# Patient Record
Sex: FEMALE | Race: WHITE | NOT HISPANIC OR LATINO | Employment: OTHER | URBAN - METROPOLITAN AREA
[De-identification: names, ages, dates, MRNs, and addresses within clinical notes are randomized per-mention and may not be internally consistent; named-entity substitution may affect disease eponyms.]

---

## 2017-01-04 RX ORDER — HYOSCYAMINE SULFATE 0.12 MG/1
TABLET SUBLINGUAL
Qty: 60 TABLET | Refills: 1 | OUTPATIENT
Start: 2017-01-04

## 2017-01-24 ENCOUNTER — TELEPHONE (OUTPATIENT)
Dept: GASTROENTEROLOGY | Facility: CLINIC | Age: 32
End: 2017-01-24

## 2017-01-24 DIAGNOSIS — K21.9 GASTROESOPHAGEAL REFLUX DISEASE, ESOPHAGITIS PRESENCE NOT SPECIFIED: Primary | ICD-10-CM

## 2017-01-25 ENCOUNTER — PATIENT MESSAGE (OUTPATIENT)
Dept: GASTROENTEROLOGY | Facility: CLINIC | Age: 32
End: 2017-01-25

## 2017-02-22 ENCOUNTER — HOSPITAL ENCOUNTER (EMERGENCY)
Facility: HOSPITAL | Age: 32
Discharge: HOME | End: 2017-02-22
Attending: EMERGENCY MEDICINE
Payer: COMMERCIAL

## 2017-02-22 VITALS
WEIGHT: 125 LBS | BODY MASS INDEX: 24.54 KG/M2 | HEART RATE: 100 BPM | OXYGEN SATURATION: 98 % | DIASTOLIC BLOOD PRESSURE: 77 MMHG | HEIGHT: 60 IN | TEMPERATURE: 99 F | RESPIRATION RATE: 18 BRPM | SYSTOLIC BLOOD PRESSURE: 117 MMHG
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
## 2017-02-22 DIAGNOSIS — S81.851A DOG BITE OF LOWER LEG, RIGHT, INITIAL ENCOUNTER: Primary | ICD-10-CM

## 2017-02-22 LAB
B-HCG UR QL: NEGATIVE
BILIRUB UR QL STRIP: NEGATIVE
CLARITY UR REFRACT.AUTO: CLEAR
COLOR UR AUTO: YELLOW
CTP QC/QA: YES
GLUCOSE UR QL STRIP: NEGATIVE
HGB UR QL STRIP: ABNORMAL
KETONES UR QL STRIP: NEGATIVE
LEUKOCYTE ESTERASE UR QL STRIP: NEGATIVE
MICROSCOPIC COMMENT: NORMAL
NITRITE UR QL STRIP: NEGATIVE
PH UR STRIP: 6 [PH] (ref 5–8)
PROT UR QL STRIP: NEGATIVE
RBC #/AREA URNS AUTO: 1 /HPF (ref 0–4)
SP GR UR STRIP: 1 (ref 1–1.03)
SQUAMOUS #/AREA URNS AUTO: 1 /HPF
URN SPEC COLLECT METH UR: ABNORMAL
UROBILINOGEN UR STRIP-ACNC: NEGATIVE EU/DL
WBC #/AREA URNS AUTO: 1 /HPF (ref 0–5)

## 2017-02-22 PROCEDURE — 90715 TDAP VACCINE 7 YRS/> IM: CPT | Performed by: PHYSICIAN ASSISTANT

## 2017-02-22 PROCEDURE — 81025 URINE PREGNANCY TEST: CPT | Performed by: PHYSICIAN ASSISTANT

## 2017-02-22 PROCEDURE — 63600175 PHARM REV CODE 636 W HCPCS: Performed by: PHYSICIAN ASSISTANT

## 2017-02-22 PROCEDURE — 90675 RABIES VACCINE IM: CPT | Performed by: PHYSICIAN ASSISTANT

## 2017-02-22 PROCEDURE — 99284 EMERGENCY DEPT VISIT MOD MDM: CPT | Mod: ,,, | Performed by: PHYSICIAN ASSISTANT

## 2017-02-22 PROCEDURE — 99283 EMERGENCY DEPT VISIT LOW MDM: CPT | Mod: 25

## 2017-02-22 PROCEDURE — 90375 RABIES IG IM/SC: CPT | Performed by: PHYSICIAN ASSISTANT

## 2017-02-22 PROCEDURE — 96372 THER/PROPH/DIAG INJ SC/IM: CPT

## 2017-02-22 PROCEDURE — 90471 IMMUNIZATION ADMIN: CPT | Performed by: PHYSICIAN ASSISTANT

## 2017-02-22 PROCEDURE — 90472 IMMUNIZATION ADMIN EACH ADD: CPT | Performed by: PHYSICIAN ASSISTANT

## 2017-02-22 PROCEDURE — 81001 URINALYSIS AUTO W/SCOPE: CPT

## 2017-02-22 RX ORDER — CALC/MAG/B COMPLEX/D3/HERB 61
15 TABLET ORAL DAILY
COMMUNITY
End: 2018-04-09

## 2017-02-22 RX ORDER — AMOXICILLIN AND CLAVULANATE POTASSIUM 875; 125 MG/1; MG/1
1 TABLET, FILM COATED ORAL 2 TIMES DAILY
Qty: 20 TABLET | Refills: 0 | Status: SHIPPED | OUTPATIENT
Start: 2017-02-22 | End: 2017-03-04

## 2017-02-22 RX ADMIN — RABIES VIRUS STRAIN PM-1503-3M ANTIGEN (PROPIOLACTONE INACTIVATED) AND WATER 2.5 UNITS: KIT at 11:02

## 2017-02-22 RX ADMIN — RABIES IMMUNE GLOBULIN (HUMAN) 1140 UNITS: 150 INJECTION INTRAMUSCULAR at 12:02

## 2017-02-22 RX ADMIN — CLOSTRIDIUM TETANI TOXOID ANTIGEN (FORMALDEHYDE INACTIVATED), CORYNEBACTERIUM DIPHTHERIAE TOXOID ANTIGEN (FORMALDEHYDE INACTIVATED), BORDETELLA PERTUSSIS TOXOID ANTIGEN (GLUTARALDEHYDE INACTIVATED), BORDETELLA PERTUSSIS FILAMENTOUS HEMAGGLUTININ ANTIGEN (FORMALDEHYDE INACTIVATED), BORDETELLA PERTUSSIS PERTACTIN ANTIGEN, AND BORDETELLA PERTUSSIS FIMBRIAE 2/3 ANTIGEN 0.5 ML: 5; 2; 2.5; 5; 3; 5 INJECTION, SUSPENSION INTRAMUSCULAR at 11:02

## 2017-02-22 NOTE — ED NOTES
"The patient came to the ER today with c/o a dog bite to the right leg. [place] dog, when the dog bit her on the leg. Last tetanus injection when she "was a kid". Pt cleaned her wound with antiseptic prior to coming to the ER. Rabies status of animal is unknown. Pt also c/o urinary frequency and thinks she has a UTI  "

## 2017-02-22 NOTE — ED PROVIDER NOTES
Encounter Date: 2017    SCRIBE #1 NOTE: I, Iva Stevenson, am scribing for, and in the presence of,  Dr. Stanley. I have scribed the following portions of the note - the APC attestation.       History     Chief Complaint   Patient presents with    Animal Bite     right shin/     Review of patient's allergies indicates:   Allergen Reactions    Codeine Rash     HPI Comments: Time seen by provider: 11:10 AM    Disclaimer: This note has been generated using voice-recognition software. There may be typographical errors that have been missed during proof-reading.    This is a 31-year-old white female with past medical history of GERD, ALLERGIES, arthritis who presents to the ER with a chief complaint of a dog bite to her right lower leg.  Patient states she was riding her bike when a dog came up to her and bit her on the leg and then ran off.  Patient has no idea whose dog it was.  She has 3 puncture wounds to her right lateral lower leg.  Patient states she cleaned them with water.  She is unsure of her last tetanus shot.  Patient also states she thinks she may have a UTI she has been having urinary frequency for the past day.  Patient states this sometimes occurs after biking.    The history is provided by the patient.     Past Medical History   Diagnosis Date    Arthritis     GERD (gastroesophageal reflux disease)     Hyperlipidemia     Seasonal allergies 6/10/2015     No past medical history pertinent negatives.  Past Surgical History   Procedure Laterality Date    Wrist surgery       Ligament repair of Right wrist x 2    Tonsillectomy  2014     Family History   Problem Relation Age of Onset    Hyperlipidemia Father     Hyperlipidemia Mother     Acne Neg Hx     Colon cancer Neg Hx     Ovarian cancer Neg Hx     Breast cancer Neg Hx     Diabetes Neg Hx     Hypertension Neg Hx     Eclampsia Neg Hx     Miscarriages / Stillbirths Neg Hx      labor Neg Hx     Stroke Neg Hx     Cancer Neg Hx      Colon polyps Neg Hx     Liver cancer Neg Hx     Liver disease Neg Hx     Cirrhosis Neg Hx     Rectal cancer Neg Hx     Stomach cancer Neg Hx     Esophageal cancer Neg Hx     Celiac disease Neg Hx     Inflammatory bowel disease Neg Hx     Crohn's disease Neg Hx      Social History   Substance Use Topics    Smoking status: Never Smoker    Smokeless tobacco: Never Used    Alcohol use 0.6 oz/week     1 Glasses of wine per week      Comment: socially     Review of Systems   Constitutional: Negative for chills and fever.   Respiratory: Negative for shortness of breath.    Cardiovascular: Negative for chest pain.   Genitourinary: Positive for frequency.   Musculoskeletal: Positive for arthralgias and joint swelling. Negative for neck pain and neck stiffness.   Skin: Positive for wound. Negative for rash.   Neurological: Negative for weakness and numbness.   Psychiatric/Behavioral: Negative for confusion.       Physical Exam   Initial Vitals   BP Pulse Resp Temp SpO2   02/22/17 0953 02/22/17 0953 02/22/17 0953 02/22/17 0953 02/22/17 0953   117/77 100 18 98.7 °F (37.1 °C) 98 %     Physical Exam    Nursing note and vitals reviewed.  Constitutional: She appears well-developed and well-nourished. No distress.   HENT:   Head: Normocephalic and atraumatic.   Neck: Normal range of motion. Neck supple.   Cardiovascular: Normal rate and regular rhythm. Exam reveals no gallop and no friction rub.    No murmur heard.  Pulmonary/Chest: Breath sounds normal. She has no wheezes. She has no rhonchi. She has no rales.   Musculoskeletal: Normal range of motion.        Right lower leg: She exhibits tenderness, swelling and laceration (3 Puncture wounds/superficial lacerations from recent dog bite). She exhibits no bony tenderness.        Legs:  Neurological: She is alert and oriented to person, place, and time.   Skin: Skin is warm and dry. Laceration (3 puncture wounds/superficial lacerations to the right lateral lower leg)  noted. No rash noted. No erythema.         ED Course   Procedures     11:30 AM - The wounds were cleaned with betadine and irrigated with saline.      12:00 PM - The dog bite wounds to the right lateral lower leg were infiltrated with 4 cc (600 units) of rabies immunoglobulin.  The remained was given IM by the nurse taking care of the patient.    Labs Reviewed   URINALYSIS - Abnormal; Notable for the following:        Result Value    Occult Blood UA 2+ (*)     All other components within normal limits    Narrative:     1 cup of urine   URINALYSIS MICROSCOPIC    Narrative:     1 cup of urine   POCT URINE PREGNANCY             Medical Decision Making:   History:   Old Medical Records: I decided to obtain old medical records.  Clinical Tests:   Lab Tests: Ordered and Reviewed  ED Management:  Patient presented to the ER with a complaint of a dog bite to her right lower leg.  She was riding her bike and a dog came up to her and bit her on the leg and then ran off.  She does not know whose dog it was and if it is up-to-date on shots.  She is unsure of her last tetanus shot.  He has 3 puncture wounds to the right lower leg.  The area was cleaned with Betadine, irrigated with saline.  She was given a tetanus shot.  She was given her first rabies vaccination.  She was also given the rabies immunoglobulin directly into the wound and the remainder given his IM injections.  She has been instructed to return on days 3, 7, and 14 for the remainder of her rabies vaccinations.  She has been instructed to followup with her PCP within the next week if symptoms not improving.  She should return to the ER for any new or worsening symptoms.  This case was discussed with my supervising physician.            Scribe Attestation:   Scribe #1: I performed the above scribed service and the documentation accurately describes the services I performed. I attest to the accuracy of the note.    Attending Attestation:     Physician Attestation  Statement for NP/PA:   I discussed this assessment and plan of this patient with the NP/PA, but I did not personally examine the patient. The face to face encounter was performed by the NP/PA.    Other NP/PA Attestation Additions:      Medical Decision Making: Dog bite       Physician Attestation for Scribe:  Physician Attestation Statement for Scribe #1: I, Dr. Stanley, reviewed documentation, as scribed by Iva Stevenson in my presence, and it is both accurate and complete.                 ED Course     Clinical Impression:   The encounter diagnosis was Dog bite of lower leg, right, initial encounter.    Disposition:   Disposition: Discharged  Condition: Stable       Ratna Carrillo PA-C  02/22/17 1556       Nichelle Stanley MD  02/24/17 1046

## 2017-02-22 NOTE — DISCHARGE INSTRUCTIONS
Rest.  Keep the wound clean and dry.  Wash daily with soap and water.  Elevate when possible.  Change dressing daily.  Tylenol or ibuprofen as directed as needed for fever/pain.  You need to receive 3 more rabies vaccines on days 3, 7, and 14.  You do not need to take the anitbiotic prescription unless you begin to show signs of infection (redness, fever, pus drainage).  Return to the ER for any new or worsening symptoms.

## 2017-02-22 NOTE — ED AVS SNAPSHOT
[facility]  [address]  [facility] [address]               Luz Muñiz   2017 11:06 AM   ED    Description:  Female : 1985   Department:  [facility]           Your Care was Coordinated By:     Provider Role From To    Nichelle Stanley MD Attending Provider 17 1112 --    Ratna Carrillo PA-C Physician Assistant 17 1106 --      Reason for Visit     Animal Bite           Diagnoses this Visit        Comments    Dog bite of lower leg, right, initial encounter    -  Primary       ED Disposition     ED Disposition Condition Comment    Discharge             To Do List           Follow-up Information     Follow up with Angie Jansen MD. Call today.    Specialty:  Internal Medicine    Why:  for day 3, 7, and 14 rabies vaccine    Contact information:    [address]  [facility] [address]  [phone]         These Medications        Disp Refills Start End    amoxicillin-clavulanate 875-125mg (AUGMENTIN) 875-125 mg per tablet 20 tablet 0 2017 3/4/2017    Take 1 tablet by mouth 2 (two) times daily. - Oral    Pharmacy: [facility]/pharmacy #[id number]  [place], [place] - [address] Ph #: [phone]         [facility]     [facility] -  Assistance  Registered nurses in the [facility] provide clinical advisement, health education, appointment booking, and other advisory services.  Call for this free service at [phone].             Medications           Message regarding Medications     Verify the changes and/or additions to your medication regime listed below are the same as discussed with your clinician today.  If any of these changes or additions are incorrect, please notify your healthcare provider.        START taking these NEW medications        Refills    amoxicillin-clavulanate 875-125mg (AUGMENTIN) 875-125 mg per tablet 0    Sig: Take 1 tablet by mouth 2 (two)  times daily.    Class: Print    Route: Oral      These medications were administered today        Dose Freq    rabies immune globulin (PF) 150 unit/mL injection 1,140 Units 20 Units/kg × 56.7 kg ED 1 Time    Sig: Inject 7.6 mLs (1,140 Units total) into the muscle ED 1 Time.    Class: Normal    Route: Intramuscular    rabies vaccine,human diploid 2.5 unit injection 2.5 Units 2.5 Units ED 1 Time    Sig: Inject 1 mL (2.5 Units total) into the muscle ED 1 Time.    Class: Normal    Route: Intramuscular    Tdap vaccine injection 0.5 mL 0.5 mL ED 1 Time    Sig: Inject 0.5 mLs into the muscle ED 1 Time.    Class: Normal    Route: Intramuscular      STOP taking these medications     esomeprazole (NEXIUM) 40 MG capsule Take 1 capsule (40 mg total) by mouth 2 (two) times daily.    hyoscyamine (LEVSIN/SL) 0.125 mg Subl PLACE 1 TABLET (0.125 MG TOTAL) UNDER THE TONGUE EVERY 12 (TWELVE) HOURS AS NEEDED.    lorazepam (ATIVAN) 0.5 MG tablet Take 0.5 mg by mouth every 6 (six) hours as needed for Anxiety.           Verify that the below list of medications is an accurate representation of the medications you are currently taking.  If none reported, the list may be blank. If incorrect, please contact your healthcare provider. Carry this list with you in case of emergency.           Current Medications     busPIRone (BUSPAR) 5 MG Tab Take 5 mg by mouth 4 (four) times daily.    lansoprazole (PREVACID) 15 MG capsule Take 15 mg by mouth once daily.    norethindrone-ethinyl estradiol (MICROGESTIN FE 1/20, 28,) 1 mg-20 mcg (21)/75 mg (7) per tablet Take 1 tablet by mouth once daily.    amoxicillin-clavulanate 875-125mg (AUGMENTIN) 875-125 mg per tablet Take 1 tablet by mouth 2 (two) times daily.    fluticasone (FLONASE) 50 mcg/actuation nasal spray 1 spray by Nasal route once daily.             Clinical Reference Information           Your Vitals Were     BP Pulse Temp Resp Height Weight    117/77 (BP Location: Right arm, Patient Position:  Sitting) 100 98.7 °F (37.1 °C) (Oral) 18 5' (1.524 m) 56.7 kg (125 lb)    SpO2 BMI             98% 24.41 kg/m2         Allergies as of 2/22/2017        Reactions    Codeine Rash      Immunizations Administered on Date of Encounter - 2/22/2017     Name Date Dose VIS Date Route    Rabies - IM 2/22/2017 2.5 Units 10/6/2009 Intramuscular    TDAP 2/22/2017 0.5 mL 2/24/2015 Intramuscular      ED Micro, Lab, POCT     Start Ordered       Status Ordering Provider    02/22/17 1117 02/22/17 1117  Urinalysis Microscopic  Once      Final result     02/22/17 1113 02/22/17 1116  POCT urine pregnancy  Once      Final result     02/22/17 1113 02/22/17 1116  Urinalysis  STAT      Final result       ED Imaging Orders     None        Discharge Instructions       Rest.  Keep the wound clean and dry.  Wash daily with soap and water.  Elevate when possible.  Change dressing daily.  Tylenol or ibuprofen as directed as needed for fever/pain.  You need to receive 3 more rabies vaccines on days 3, 7, and 14.  You do not need to take the anitbiotic prescription unless you begin to show signs of infection (redness, fever, pus drainage).  Return to the ER for any new or worsening symptoms.    Discharge References/Attachments     DOG BITE (ENGLISH)    RABIES IMMUNE GLOBULIN, HUMAN RIG SOLUTION FOR INJECTION (ENGLISH)    RABIES VACCINE SUSPENSION FOR INJECTION (ENGLISH)      Your Scheduled Appointments     Mar 06, 2017  9:00 AM CST   Annual Checkup/Physical with Ifeoma Gordon MD   [facility] - OB/GYN [address] ([facility])    [address] [address] [phone]            Mar 16, 2017  8:00 AM CDT   GASTROENTEROLOGY ESTABLISHED PATIENT with Cornelio Webb MD   [facility] - Gastroenterology ([facility] )    [address]  [place] [address] [phone]            Mar 16, 2017  9:00 AM CDT   Non-Fasting Lab with LAB, APPOINTMENT [place] [facility] ([facility])     [address]  [facility] [address]   [phone]            Apr 24, 2017  8:30 AM CDT   Annual Checkup/Physical with MD [facility] - Internal Medicine ([facility])    [address]  [facility] [address]   [phone]               [facility] complies with applicable Federal civil rights laws and does not discriminate on the basis of race, color, national origin, age, disability, or sex.        Language Assistance Services     ATTENTION: Language assistance services are available, free of charge. Please call [phone].      ATENCIÓN: Si habla español, tiene a butcher disposición servicios gratuitos de asistencia lingüística. Llame al [phone].     CHÚ Ý: N?u b?n nói Ti?ng Vi?t, có các d?ch v? h? tr? ngôn ng? mi?n phí dành cho b?n. G?i s? [phone].

## 2017-02-23 DIAGNOSIS — Z23 NEED FOR VACCINATION: Primary | ICD-10-CM

## 2017-02-24 ENCOUNTER — CLINICAL SUPPORT (OUTPATIENT)
Dept: INFECTIOUS DISEASES | Facility: CLINIC | Age: 32
End: 2017-02-24
Payer: COMMERCIAL

## 2017-02-24 DIAGNOSIS — Z23 NEED FOR VACCINATION: ICD-10-CM

## 2017-02-24 PROCEDURE — 90675 RABIES VACCINE IM: CPT | Mod: S$GLB,,, | Performed by: INTERNAL MEDICINE

## 2017-02-24 PROCEDURE — 99999 PR PBB SHADOW E&M-EST. PATIENT-LVL I: CPT | Mod: PBBFAC,,,

## 2017-02-24 PROCEDURE — 90471 IMMUNIZATION ADMIN: CPT | Mod: S$GLB,,, | Performed by: INTERNAL MEDICINE

## 2017-02-25 DIAGNOSIS — Z30.40 ENCOUNTER FOR SURVEILLANCE OF CONTRACEPTIVES: ICD-10-CM

## 2017-02-27 RX ORDER — NORETHINDRONE ACETATE AND ETHINYL ESTRADIOL AND FERROUS FUMARATE 1MG-20(21)
KIT ORAL
Qty: 28 TABLET | Refills: 1 | Status: SHIPPED | OUTPATIENT
Start: 2017-02-27 | End: 2017-03-06 | Stop reason: SDUPTHER

## 2017-03-03 ENCOUNTER — CLINICAL SUPPORT (OUTPATIENT)
Dept: INFECTIOUS DISEASES | Facility: CLINIC | Age: 32
End: 2017-03-03
Payer: COMMERCIAL

## 2017-03-03 DIAGNOSIS — Z23 NEED FOR VACCINATION: ICD-10-CM

## 2017-03-03 PROCEDURE — 99999 PR PBB SHADOW E&M-EST. PATIENT-LVL I: CPT | Mod: PBBFAC,,,

## 2017-03-03 PROCEDURE — 90471 IMMUNIZATION ADMIN: CPT | Mod: S$GLB,,, | Performed by: INTERNAL MEDICINE

## 2017-03-03 PROCEDURE — 90675 RABIES VACCINE IM: CPT | Mod: S$GLB,,, | Performed by: INTERNAL MEDICINE

## 2017-03-06 ENCOUNTER — OFFICE VISIT (OUTPATIENT)
Dept: OBSTETRICS AND GYNECOLOGY | Facility: CLINIC | Age: 32
End: 2017-03-06
Attending: OBSTETRICS & GYNECOLOGY
Payer: COMMERCIAL

## 2017-03-06 VITALS
DIASTOLIC BLOOD PRESSURE: 76 MMHG | WEIGHT: 138.69 LBS | BODY MASS INDEX: 27.23 KG/M2 | HEIGHT: 60 IN | SYSTOLIC BLOOD PRESSURE: 118 MMHG

## 2017-03-06 DIAGNOSIS — Z01.419 ENCOUNTER FOR GYNECOLOGICAL EXAMINATION WITHOUT ABNORMAL FINDING: Primary | ICD-10-CM

## 2017-03-06 DIAGNOSIS — Z30.40 ENCOUNTER FOR SURVEILLANCE OF CONTRACEPTIVES: ICD-10-CM

## 2017-03-06 PROCEDURE — 99999 PR PBB SHADOW E&M-EST. PATIENT-LVL II: CPT | Mod: PBBFAC,,, | Performed by: OBSTETRICS & GYNECOLOGY

## 2017-03-06 PROCEDURE — 99395 PREV VISIT EST AGE 18-39: CPT | Mod: S$GLB,,, | Performed by: OBSTETRICS & GYNECOLOGY

## 2017-03-06 RX ORDER — NORETHINDRONE ACETATE AND ETHINYL ESTRADIOL AND FERROUS FUMARATE 1MG-20(21)
1 KIT ORAL DAILY
Qty: 28 TABLET | Refills: 12 | Status: SHIPPED | OUTPATIENT
Start: 2017-03-06 | End: 2018-03-17 | Stop reason: SDUPTHER

## 2017-03-06 RX ORDER — BUSPIRONE HYDROCHLORIDE 15 MG/1
15 TABLET ORAL 3 TIMES DAILY
Refills: 3 | COMMUNITY
Start: 2017-02-27 | End: 2019-02-06

## 2017-03-06 NOTE — PROGRESS NOTES
Subjective:       Patient ID: Luz Muñiz is a 31 y.o. female.    Chief Complaint:  Well Woman and Medication Refill (Microgestin FE  refill desired by pt.)      History of Present Illness  HPI  Luz Muñiz is a 31 y.o. female  here for her annual GYN exam.  She primarily takes the pill to control her cycles and for acne. Periods were heavy as a teenager, [place] age 9.  She describes her periods as regular, normal flow, lasting 4-5 days.   Denies break through bleeding.   Denies vaginal itching or irritation.  Denies vaginal discharge.  She is not currently sexually active. She has not been with a partner in over a year and a half.  She uses oral contraceptives (estrogen/progesterone) for contraception.   History of abnormal pap: No  Last Pap: approximate date  and was normal  Last MMG: None  Last Colonoscopy:  (polyps removed)  Denies domestic violence. She does feel safe at home.     Past Medical History:   Diagnosis Date    Arthritis     GERD (gastroesophageal reflux disease)     Hyperlipidemia     Seasonal allergies 6/10/2015     Past Surgical History:   Procedure Laterality Date    TONSILLECTOMY  2014    WRIST SURGERY      Ligament repair of Right wrist x 2     Social History     Social History    Marital status: Single     Spouse name: N/A    Number of children: N/A    Years of education: N/A     Occupational History          Social History Main Topics    Smoking status: Never Smoker    Smokeless tobacco: Never Used    Alcohol use 0.6 oz/week     1 Glasses of wine per week      Comment: socially    Drug use: No    Sexual activity: Not Currently     Partners: Male     Birth control/ protection: OCP     Other Topics Concern    Are You Pregnant Or Think You May Be? No    Breast-Feeding No     Social History Narrative     Family History   Problem Relation Age of Onset    Hyperlipidemia Father     Hyperlipidemia Mother     Acne Neg Hx     Colon  cancer Neg Hx     Ovarian cancer Neg Hx     Breast cancer Neg Hx     Diabetes Neg Hx     Hypertension Neg Hx     Eclampsia Neg Hx     Miscarriages / Stillbirths Neg Hx      labor Neg Hx     Stroke Neg Hx     Cancer Neg Hx     Colon polyps Neg Hx     Liver cancer Neg Hx     Liver disease Neg Hx     Cirrhosis Neg Hx     Rectal cancer Neg Hx     Stomach cancer Neg Hx     Esophageal cancer Neg Hx     Celiac disease Neg Hx     Inflammatory bowel disease Neg Hx     Crohn's disease Neg Hx      OB History      Para Term  AB TAB SAB Ectopic Multiple Living    0                   /76  Ht 5' (1.524 m)  Wt 62.9 kg (138 lb 10.7 oz)  LMP 2017 (Approximate)  BMI 27.08 kg/m2        GYN & OB History  Patient's last menstrual period was 2017 (approximate).   Date of Last Pap: 3/11/2016    OB History    Para Term  AB SAB TAB Ectopic Multiple Living   0                      Review of Systems  Review of Systems   Constitutional: Negative for activity change, appetite change, fatigue and unexpected weight change.   HENT: Negative.    Eyes: Negative for visual disturbance.   Respiratory: Negative for shortness of breath and wheezing.    Cardiovascular: Negative for chest pain, palpitations and leg swelling.   Gastrointestinal: Negative for abdominal pain, bloating and blood in stool.   Endocrine: Negative for diabetes and hair loss.   Genitourinary: Negative for decreased libido, dyspareunia, menorrhagia and menstrual problem.   Musculoskeletal: Negative for back pain and joint swelling.   Skin:  Negative for no acne and hair changes.   Neurological: Negative for headaches.   Hematological: Does not bruise/bleed easily.   Psychiatric/Behavioral: Negative for depression and sleep disturbance. The patient is not nervous/anxious.    Breast: Negative for breast pain and nipple discharge          Objective:    Physical Exam:   Constitutional: She is oriented to person,  place, and time. She appears well-developed and well-nourished.    HENT:   Head: Normocephalic and atraumatic.    Eyes: EOM are normal. Pupils are equal, round, and reactive to light.    Neck: Normal range of motion. Neck supple.    Cardiovascular: Normal rate and regular rhythm.     Pulmonary/Chest: Effort normal and breath sounds normal.   BREASTS: Symmetrical, no skin changes or visible lesions.  No palpable masses, nipple discharge bilaterally.          Abdominal: Soft. Bowel sounds are normal.     Genitourinary: Vagina normal. Pelvic exam was performed with patient supine.   Genitourinary Comments: PELVIC: Normal external genitalia without lesions.  Normal hair distribution.  Adequate perineal body, normal urethral meatus.  Vagina moist and well rugated without lesions or discharge.  Cervix pink, without lesions, discharge or tenderness.  No significant cystocele or rectocele.  Bimanual exam shows uterus to be normal size, regular, mobile and nontender.  Adnexa without masses or tenderness.               Musculoskeletal: Normal range of motion and moves all extremeties.       Neurological: She is alert and oriented to person, place, and time.    Skin: Skin is warm and dry.    Psychiatric: She has a normal mood and affect.          Assessment:        1. Encounter for gynecological examination without abnormal finding    2. Encounter for surveillance of contraceptives                Plan:      1. Encounter for gynecological examination without abnormal finding  COUNSELING:  The patient was counseled today on  regular weight bearing exercise. The patient was also counseled today on ACS PAP guidelines, with recommendations for yearly pelvic exams unless their uterus, cervix, and ovaries were removed for benign reasons; in that case, examinations every 3-5 years are recommended. The patient was also counseled regarding monthly breast self-examination, routine STD screening for at-risk populations, prophylactic  immunizations for transmitted infections such as HPV, Pertussis, or Influenza as appropriate, and yearly mammograms when indicated by ACS guidelines. She was advised to see her primary care physician for all other health maintenance.   FOLLOW-UP with me for next routine visit.         2. Encounter for surveillance of contraceptives    - MICROGESTIN FE 1/20, 28, 1 mg-20 mcg (21)/75 mg (7) per tablet; Take 1 tablet by mouth once daily.  Dispense: 28 tablet; Refill: 12       Return in about 1 year (around 3/6/2018).

## 2017-03-10 ENCOUNTER — CLINICAL SUPPORT (OUTPATIENT)
Dept: INFECTIOUS DISEASES | Facility: CLINIC | Age: 32
End: 2017-03-10
Payer: COMMERCIAL

## 2017-03-10 DIAGNOSIS — Z23 NEED FOR VACCINATION: ICD-10-CM

## 2017-03-10 PROCEDURE — 90471 IMMUNIZATION ADMIN: CPT | Mod: S$GLB,,, | Performed by: INTERNAL MEDICINE

## 2017-03-10 PROCEDURE — 99999 PR PBB SHADOW E&M-EST. PATIENT-LVL I: CPT | Mod: PBBFAC,,,

## 2017-03-10 PROCEDURE — 90675 RABIES VACCINE IM: CPT | Mod: S$GLB,,, | Performed by: INTERNAL MEDICINE

## 2017-03-16 ENCOUNTER — OFFICE VISIT (OUTPATIENT)
Dept: GASTROENTEROLOGY | Facility: CLINIC | Age: 32
End: 2017-03-16
Payer: COMMERCIAL

## 2017-03-16 VITALS
WEIGHT: 151.88 LBS | BODY MASS INDEX: 29.82 KG/M2 | HEIGHT: 60 IN | SYSTOLIC BLOOD PRESSURE: 101 MMHG | DIASTOLIC BLOOD PRESSURE: 69 MMHG | HEART RATE: 92 BPM

## 2017-03-16 DIAGNOSIS — Z79.899 ENCOUNTER FOR MONITORING LONG-TERM PROTON PUMP INHIBITOR THERAPY: ICD-10-CM

## 2017-03-16 DIAGNOSIS — Z51.81 ENCOUNTER FOR MONITORING LONG-TERM PROTON PUMP INHIBITOR THERAPY: ICD-10-CM

## 2017-03-16 DIAGNOSIS — K21.9 GASTROESOPHAGEAL REFLUX DISEASE, ESOPHAGITIS PRESENCE NOT SPECIFIED: Primary | ICD-10-CM

## 2017-03-16 PROCEDURE — 99999 PR PBB SHADOW E&M-EST. PATIENT-LVL III: CPT | Mod: PBBFAC,,, | Performed by: INTERNAL MEDICINE

## 2017-03-16 PROCEDURE — 1160F RVW MEDS BY RX/DR IN RCRD: CPT | Mod: S$GLB,,, | Performed by: INTERNAL MEDICINE

## 2017-03-16 PROCEDURE — 99213 OFFICE O/P EST LOW 20 MIN: CPT | Mod: S$GLB,,, | Performed by: INTERNAL MEDICINE

## 2017-03-16 NOTE — PROGRESS NOTES
CHIEF COMPLAINT: GERD and long term PPI.     HISTORY OF PRESENT ILLNESS: This is a 31-year-old white female who attended   [employer]. She is a . She had been  having heartburn symptoms since age 17, been on Nexium for about 10 years once   a day, then was switched to Protonix for cost savings, but it never really   worked. Since she took that for about two months and then got back on Nexium   over-the-counter. She has now been on prevacid 15mg once daily with good GERD symptom   Control. No odynophagia. No drooling. No   weakness in her arms or legs. No paresthesias. No bladder or bowel   incontinence. She still continues to work. She denies any fever or chills,   neck pain, headaches or abdominal pain.     REVIEW OF SYSTEMS:  CONSTITUTIONAL: No fever or fatigue.  EYES: No visual disturbances.  ENT: No dysphagia.   CARDIOVASCULAR: No chest pain. No palpitations.  RESPIRATORY: No shortness of breath or cough.  GENITOURINARY: No dysuria, urgency or frequency.  MUSCULOSKELETAL: She denies any significant arthritis.  SKIN: No itching or rash.  NEUROLOGIC: No headache, syncope or stroke.  PSYCHIATRIC: She denies any uncontrolled depression or anxiety.  LYMPHATICS: No lymphadenopathy.  GASTROINTESTINAL: No nausea or vomiting. Heartburn as above. No abdominal   pain. No early satiety. No diarrhea or blood in her stool.     PAST MEDICAL HISTORY: GERD, dysphagia, seasonal allergies, long-term PPI use.     PAST SURGICAL HISTORY: Tonsillectomy, wrist surgery.     FAMILY HISTORY: Nobody with celiac sprue. Nobody with colon cancer. Nobody   with advanced colon adenomatous polyps before the age of 60. No FAP, no   attenuated FAP, no MAP, no Ortega syndrome. Nobody with inflammatory bowel   disease or celiac sprue.     SOCIAL HISTORY: Alcohol, she does drink alcohol occasionally. She does not   smoke. She drinks in moderation. She is single, never , no kids. She   works as a . She is  currently single     PHYSICAL EXAMINATION: Today,  /69  Pulse 92  Ht 5' (1.524 m)  Wt 68.9 kg (151 lb 14.4 oz)  LMP 02/20/2017 (Approximate)  BMI 29.67 kg/m2  GENERAL: She is alert and oriented x4, not in any acute distress. Chaperone in  the room, Deidre ABDOMEN: Soft, no guarding, no rebound, no tenderness, no   palpable organomegaly, no bruits, no pulsatile masses, no stigmata of chronic   liver disease, no appreciative ascites or hernias.  CARDIOVASCULAR: S1 and S2 without murmurs, gallops or rubs.  RESPIRATORY: Clear to auscultation bilaterally without wheezes, rhonchi or   rales.  SKIN: No petechiae or rash on exposed skin areas.  NEUROLOGIC: Alert and oriented x4.  PSYCHIATRIC: Normal speech, mentation and affect.  LYMPHATICS: No cervical or supraclavicular lymphadenopathy. No appreciative   thyromegaly.        MEDICAL DECISION MAKING: Reviewed last chest x-ray and an AP and   lateral of the neck, read by Radiology as normal, no evidence of any foreign   bodies. The patient's comprehensive metabolic panel, CBC and lipase normal.   Last colonoscopy for 8/20  Impression:           - Congested mucosa in the terminal ileum. Biopsied.                        - Erythematous mucosa in the terminal ileum. Could                         be related NSAID's.                        - Altered vascular, congested, erythematous and                         ulcerated mucosa in the terminal ileum. Biopsied.                        - One 2 mm polyp in the rectum. Resected and                         retrieved.                        - One 2 mm polyp in the rectum. Resected and                         retrieved.  Recommendation:       - Discharge patient to home.                        - Await pathology results.                        - Telephone endoscopist for pathology results in 2                         weeks.                        - Return to GI clinic at the next available                         appointment.                         - Consider avoiding all non-steroidal                         anti-inflammatory drugs (aspirin, ibuprofen,                         naproxen, etc.), unless needed for cardiovascular                         protection. Recommend you discuss with your                         prescribing doctor (of your aspirin) to see if                         cardiovascular benefits of your aspirin outweigh                         the risks of GI bleeding.                        - Repeat colonoscopy in 4 months to check healing.                        - The findings and recommendations were discussed                         with the patient.15 reviewed.       IMPRESSION AND PLAN:   1. GERD well controlled on long term PPI recommend PPI labs once yearly and she not sure if she can afford them   And can't afford follow up colonoscopy but asymptomatic.  2. RTC once yearly.

## 2017-03-29 ENCOUNTER — PATIENT MESSAGE (OUTPATIENT)
Dept: INTERNAL MEDICINE | Facility: CLINIC | Age: 32
End: 2017-03-29

## 2017-04-24 ENCOUNTER — OFFICE VISIT (OUTPATIENT)
Dept: INTERNAL MEDICINE | Facility: CLINIC | Age: 32
End: 2017-04-24
Payer: COMMERCIAL

## 2017-04-24 VITALS
BODY MASS INDEX: 29.86 KG/M2 | DIASTOLIC BLOOD PRESSURE: 60 MMHG | SYSTOLIC BLOOD PRESSURE: 110 MMHG | HEIGHT: 60 IN | WEIGHT: 152.13 LBS

## 2017-04-24 DIAGNOSIS — Z00.00 ANNUAL PHYSICAL EXAM: Primary | ICD-10-CM

## 2017-04-24 PROBLEM — K21.9 GASTROESOPHAGEAL REFLUX DISEASE: Status: RESOLVED | Noted: 2017-03-16 | Resolved: 2017-04-24

## 2017-04-24 PROCEDURE — 99999 PR PBB SHADOW E&M-EST. PATIENT-LVL III: CPT | Mod: PBBFAC,,, | Performed by: INTERNAL MEDICINE

## 2017-04-24 PROCEDURE — 99395 PREV VISIT EST AGE 18-39: CPT | Mod: S$GLB,,, | Performed by: INTERNAL MEDICINE

## 2017-04-24 RX ORDER — AZELASTINE 1 MG/ML
1 SPRAY, METERED NASAL 2 TIMES DAILY
Refills: 5 | COMMUNITY
Start: 2017-03-09

## 2017-04-24 NOTE — PROGRESS NOTES
Subjective:       Patient ID: Luz Muñiz is a 31 y.o. female.    Chief Complaint: Annual Exam    HPI Comments: Annual    GERD sx- recall had EGD 7/15.  One duodenal polyp. Resected and retrieved, also one gastric polyp. Resected and retrieved. There was erythematous mucosa in the antrum and prepyloric region of the stomach. Biopsied; also a 1 cm hiatus hernia. Biopsied.    Just saw GI; PPI labs and repeat colonoscopy recommended, but told OK to defer given cost issues and no alarm sx.  Doing well on PPI now.    Very concerned because father has had some issues with heart disease and recently had to have open heart surgery.  She has high cholesterol.  I recommend a repeat lab work; we may consider cholesterol treatment.  She has had no syncope, chest pain, pressure, tightness or shortness of breath.    She is otherwise up-to-date on her health maintenance.  She had to get rabies vaccine series given that she was bitten by a wild dog.    Patient Active Problem List:     Seasonal allergies     Gastroesophageal reflux disease with esophagitis: see EGD 2015     Mixed hyperlipidemia     Asthmatic bronchitis     Encounter for monitoring long-term proton pump inhibitor therapy      Review of Systems   Constitutional: Negative for fatigue and fever.   Eyes: Negative for visual disturbance.   Respiratory: Negative for cough and shortness of breath.    Cardiovascular: Negative for chest pain.   Gastrointestinal: Negative for abdominal distention, abdominal pain, blood in stool, constipation, diarrhea, nausea, rectal pain and vomiting.        GERD sx- stable on meds       Objective:      Physical Exam   Constitutional: She is oriented to person, place, and time. She appears well-developed and well-nourished.   HENT:   Head: Normocephalic and atraumatic.   Right Ear: External ear normal.   Left Ear: External ear normal.   Nose: Nose normal.   Mouth/Throat: Oropharynx is clear and moist. No oropharyngeal exudate.   Eyes:  Conjunctivae and EOM are normal. No scleral icterus.   Neck: Normal range of motion. Neck supple. No JVD present. No thyromegaly present.   Cardiovascular: Normal rate, regular rhythm, normal heart sounds and intact distal pulses.  Exam reveals no gallop.    No murmur heard.  Pulmonary/Chest: Effort normal and breath sounds normal. No respiratory distress. She has no wheezes. She exhibits no tenderness.   Abdominal: Soft. Bowel sounds are normal. She exhibits no distension and no mass. There is no tenderness. There is no rebound and no guarding.   Musculoskeletal: Normal range of motion. She exhibits no edema or tenderness.   Lymphadenopathy:     She has no cervical adenopathy.   Neurological: She is alert and oriented to person, place, and time. She displays normal reflexes. No cranial nerve deficit. Coordination normal.   Skin: Skin is warm. No rash noted. No erythema.   Psychiatric: She has a normal mood and affect. Her behavior is normal. Judgment and thought content normal.   Nursing note and vitals reviewed.      Assessment:       1. Annual physical exam        Plan:         Annual physical exam  -     Lipid panel; Future; Expected date: 4/24/17    I will review all studies and determine further tx depending on findings    Lifestyle issues, diet and exercise

## 2017-04-24 NOTE — MR AVS SNAPSHOT
[facility] - [facility] Medicine  [address]  [facility] [address]  Phone: [phone]  Fax: [phone]                  Luz Muñiz   2017 8:30 AM   Office Visit    Description:  Female : 1985   Provider:  Angie Jansen MD   Department:  [facility]           Reason for Visit     Annual Exam           Diagnoses this Visit        Comments    Annual physical exam    -  Primary            To Do List           Future Appointments        Provider Department Dept Phone    2017 7:10 AM LAB, APPOINTMENT [facility] [phone]      Goals (5 Years of Data)     None      Follow-Up and Disposition     Return in about 1 year (around 2018) for [facility].    Follow-up and Disposition History      [facility]     [facility] Nurse Care Line -  Assistance  Unless otherwise directed by your provider, please contact [facility], our nurse care line that is available for  assistance.     Registered nurses in the [facility] provide: appointment scheduling, clinical advisement, health education, and other advisory services.  Call: [phone] (toll free)               Medications           Message regarding Medications     Verify the changes and/or additions to your medication regime listed below are the same as discussed with your clinician today.  If any of these changes or additions are incorrect, please notify your healthcare provider.             Verify that the below list of medications is an accurate representation of the medications you are currently taking.  If none reported, the list may be blank. If incorrect, please contact your healthcare provider. Carry this list with you in case of emergency.           Current Medications     busPIRone (BUSPAR) 15 MG tablet Take 15 mg by mouth 3 (three) times daily.    CETIRIZINE HCL (ZYRTEC ORAL) Take by mouth.    fluticasone (FLONASE) 50 mcg/actuation nasal  spray 1 spray by Nasal route once daily.      lansoprazole (PREVACID) 15 MG capsule Take 15 mg by mouth once daily.    MICROGESTIN FE 1/20, 28, 1 mg-20 mcg (21)/75 mg (7) per tablet Take 1 tablet by mouth once daily.    azelastine (ASTELIN) 137 mcg (0.1 %) nasal spray 1 spray by Nasal route 2 (two) times daily.           Clinical Reference Information           Your Vitals Were     BP Height Weight BMI       110/60 5' (1.524 m) 69 kg (152 lb 1.9 oz) 29.71 kg/m2       Blood Pressure          Most Recent Value    BP  110/60      Allergies as of 4/24/2017     Codeine      Immunizations Administered on Date of Encounter - 4/24/2017     None      Orders Placed During Today's Visit     Future Labs/Procedures Expected by Expires    Lipid panel  4/24/2017 4/24/2018      Language Assistance Services     ATTENTION: Language assistance services are available, free of charge. Please call [phone].      ATENCIÓN: Si habla español, tiene a butcher disposición servicios gratuitos de asistencia lingüística. Llame al [phone].     LUCILLE Ý: N?u b?n nói Ti?ng Vi?t, có các d?ch v? h? tr? ngôn ng? mi?n phí dành cho b?n. G?i s? [phone].         [facility] complies with applicable Federal civil rights laws and does not discriminate on the basis of race, color, national origin, age, disability, or sex.

## 2017-04-25 ENCOUNTER — PATIENT MESSAGE (OUTPATIENT)
Dept: INTERNAL MEDICINE | Facility: CLINIC | Age: 32
End: 2017-04-25

## 2017-04-25 ENCOUNTER — LAB VISIT (OUTPATIENT)
Dept: LAB | Facility: HOSPITAL | Age: 32
End: 2017-04-25
Attending: INTERNAL MEDICINE
Payer: COMMERCIAL

## 2017-04-25 DIAGNOSIS — Z00.00 ANNUAL PHYSICAL EXAM: ICD-10-CM

## 2017-04-25 DIAGNOSIS — E78.2 MIXED HYPERLIPIDEMIA: Primary | ICD-10-CM

## 2017-04-25 LAB
CHOLEST/HDLC SERPL: 4.6 {RATIO}
HDL/CHOLESTEROL RATIO: 21.9 %
HDLC SERPL-MCNC: 278 MG/DL
HDLC SERPL-MCNC: 61 MG/DL
LDLC SERPL CALC-MCNC: 189.6 MG/DL
NONHDLC SERPL-MCNC: 217 MG/DL
TRIGL SERPL-MCNC: 137 MG/DL

## 2017-04-25 PROCEDURE — 80061 LIPID PANEL: CPT

## 2017-04-25 PROCEDURE — 36415 COLL VENOUS BLD VENIPUNCTURE: CPT

## 2017-04-26 ENCOUNTER — PATIENT MESSAGE (OUTPATIENT)
Dept: INTERNAL MEDICINE | Facility: CLINIC | Age: 32
End: 2017-04-26

## 2017-04-26 RX ORDER — ATORVASTATIN CALCIUM 20 MG/1
20 TABLET, FILM COATED ORAL DAILY
Qty: 90 TABLET | Refills: 3 | Status: SHIPPED | OUTPATIENT
Start: 2017-04-26 | End: 2019-02-06

## 2017-04-26 NOTE — TELEPHONE ENCOUNTER
Labs in 3 months, please send her a my chart message and schedule and let me know when scheduled.  Thank you

## 2017-05-28 ENCOUNTER — PATIENT MESSAGE (OUTPATIENT)
Dept: INTERNAL MEDICINE | Facility: CLINIC | Age: 32
End: 2017-05-28

## 2017-06-19 ENCOUNTER — PATIENT MESSAGE (OUTPATIENT)
Dept: INTERNAL MEDICINE | Facility: CLINIC | Age: 32
End: 2017-06-19

## 2017-07-11 ENCOUNTER — PATIENT MESSAGE (OUTPATIENT)
Dept: INTERNAL MEDICINE | Facility: CLINIC | Age: 32
End: 2017-07-11

## 2017-07-11 DIAGNOSIS — E78.2 MIXED HYPERLIPIDEMIA: Primary | ICD-10-CM

## 2017-11-28 ENCOUNTER — TELEPHONE (OUTPATIENT)
Dept: INTERNAL MEDICINE | Facility: CLINIC | Age: 32
End: 2017-11-28

## 2017-11-29 NOTE — TELEPHONE ENCOUNTER
Please contact her for labs (ordered in July) fasting- thanks    Please let me know when scheduled

## 2017-11-29 NOTE — TELEPHONE ENCOUNTER
----- Message from Angie Jansen MD sent at 7/28/2017 12:49 PM CDT -----  Labs November on Niacin not lipitor

## 2017-12-01 ENCOUNTER — PATIENT MESSAGE (OUTPATIENT)
Dept: INTERNAL MEDICINE | Facility: CLINIC | Age: 32
End: 2017-12-01

## 2017-12-08 ENCOUNTER — LAB VISIT (OUTPATIENT)
Dept: LAB | Facility: HOSPITAL | Age: 32
End: 2017-12-08
Attending: INTERNAL MEDICINE
Payer: COMMERCIAL

## 2017-12-08 DIAGNOSIS — E78.2 MIXED HYPERLIPIDEMIA: ICD-10-CM

## 2017-12-08 LAB
AST SERPL-CCNC: 19 U/L
CHOLEST SERPL-MCNC: 254 MG/DL
CHOLEST/HDLC SERPL: 5 {RATIO}
HDLC SERPL-MCNC: 51 MG/DL
HDLC SERPL: 20.1 %
LDLC SERPL CALC-MCNC: 175.8 MG/DL
NONHDLC SERPL-MCNC: 203 MG/DL
TRIGL SERPL-MCNC: 136 MG/DL
TSH SERPL DL<=0.005 MIU/L-ACNC: 1.56 UIU/ML

## 2017-12-08 PROCEDURE — 84443 ASSAY THYROID STIM HORMONE: CPT

## 2017-12-08 PROCEDURE — 84450 TRANSFERASE (AST) (SGOT): CPT

## 2017-12-08 PROCEDURE — 36415 COLL VENOUS BLD VENIPUNCTURE: CPT

## 2017-12-08 PROCEDURE — 80061 LIPID PANEL: CPT

## 2017-12-10 ENCOUNTER — PATIENT MESSAGE (OUTPATIENT)
Dept: INTERNAL MEDICINE | Facility: CLINIC | Age: 32
End: 2017-12-10

## 2018-03-17 DIAGNOSIS — Z30.40 ENCOUNTER FOR SURVEILLANCE OF CONTRACEPTIVES: ICD-10-CM

## 2018-03-19 RX ORDER — NORETHINDRONE ACETATE AND ETHINYL ESTRADIOL AND FERROUS FUMARATE 1MG-20(21)
1 KIT ORAL DAILY
Qty: 28 TABLET | Refills: 1 | Status: SHIPPED | OUTPATIENT
Start: 2018-03-19 | End: 2018-04-24 | Stop reason: SDUPTHER

## 2018-04-09 ENCOUNTER — PATIENT MESSAGE (OUTPATIENT)
Dept: INTERNAL MEDICINE | Facility: CLINIC | Age: 33
End: 2018-04-09

## 2018-04-09 RX ORDER — LANSOPRAZOLE 30 MG/1
30 CAPSULE, DELAYED RELEASE ORAL DAILY
Qty: 30 CAPSULE | Refills: 11 | Status: SHIPPED | OUTPATIENT
Start: 2018-04-09 | End: 2019-03-08

## 2018-04-24 ENCOUNTER — OFFICE VISIT (OUTPATIENT)
Dept: OBSTETRICS AND GYNECOLOGY | Facility: CLINIC | Age: 33
End: 2018-04-24
Attending: OBSTETRICS & GYNECOLOGY
Payer: COMMERCIAL

## 2018-04-24 VITALS
WEIGHT: 140.88 LBS | SYSTOLIC BLOOD PRESSURE: 110 MMHG | DIASTOLIC BLOOD PRESSURE: 70 MMHG | HEIGHT: 60 IN | BODY MASS INDEX: 27.66 KG/M2

## 2018-04-24 DIAGNOSIS — Z01.419 ENCOUNTER FOR GYNECOLOGICAL EXAMINATION WITHOUT ABNORMAL FINDING: Primary | ICD-10-CM

## 2018-04-24 DIAGNOSIS — Z30.41 ENCOUNTER FOR SURVEILLANCE OF CONTRACEPTIVE PILLS: ICD-10-CM

## 2018-04-24 PROCEDURE — 99395 PREV VISIT EST AGE 18-39: CPT | Mod: S$GLB,,, | Performed by: OBSTETRICS & GYNECOLOGY

## 2018-04-24 PROCEDURE — 99999 PR PBB SHADOW E&M-EST. PATIENT-LVL III: CPT | Mod: PBBFAC,,, | Performed by: OBSTETRICS & GYNECOLOGY

## 2018-04-24 RX ORDER — LORAZEPAM 0.5 MG/1
TABLET ORAL
Refills: 0 | COMMUNITY
Start: 2018-04-01 | End: 2019-02-06

## 2018-04-24 RX ORDER — AMOXICILLIN AND CLAVULANATE POTASSIUM 875; 125 MG/1; MG/1
1 TABLET, FILM COATED ORAL 2 TIMES DAILY
Refills: 0 | COMMUNITY
Start: 2018-01-31 | End: 2019-02-06

## 2018-04-24 RX ORDER — NORETHINDRONE ACETATE AND ETHINYL ESTRADIOL AND FERROUS FUMARATE 1MG-20(21)
1 KIT ORAL DAILY
Qty: 28 TABLET | Refills: 12 | Status: SHIPPED | OUTPATIENT
Start: 2018-04-24 | End: 2019-05-08 | Stop reason: SDUPTHER

## 2018-04-24 NOTE — PROGRESS NOTES
Subjective:       Patient ID: Luz Muñiz is a 32 y.o. female.    Chief Complaint:  Gynecologic Exam      History of Present Illness  HPI    Luz Muñiz is a 32 y.o. female  here for her annual GYN exam.    She describes her periods as regular, normal flow, lasting 4 days.   denies break through bleeding.   denies vaginal itching or irritation.  Denies vaginal discharge.  She is not currently sexually active.  She uses abstinence, oral contraceptives (estrogen/progesterone) for contraception.   History of abnormal pap: No  Last Pap: approximate date  and was normal  denies domestic violence. She does feel safe at home.     Past Medical History:   Diagnosis Date    Arthritis     GERD (gastroesophageal reflux disease)     Hyperlipidemia     Seasonal allergies 6/10/2015     Past Surgical History:   Procedure Laterality Date    TONSILLECTOMY  2014    WRIST SURGERY      Ligament repair of Right wrist x 2     Social History     Social History    Marital status: Single     Spouse name: N/A    Number of children: N/A    Years of education: N/A     Occupational History          Social History Main Topics    Smoking status: Never Smoker    Smokeless tobacco: Never Used    Alcohol use 0.6 oz/week     1 Glasses of wine per week      Comment: socially    Drug use: No    Sexual activity: Not Currently     Partners: Male     Birth control/ protection: OCP     Other Topics Concern    Are You Pregnant Or Think You May Be? No    Breast-Feeding No     Social History Narrative    No narrative on file     Family History   Problem Relation Age of Onset    Hyperlipidemia Father     Heart disease Father     Hyperlipidemia Mother     Cancer Paternal Grandfather      skin    No Known Problems Brother     Acne Neg Hx     Colon cancer Neg Hx     Ovarian cancer Neg Hx     Breast cancer Neg Hx     Diabetes Neg Hx     Hypertension Neg Hx     Eclampsia Neg Hx     Miscarriages /  Stillbirths Neg Hx      labor Neg Hx     Stroke Neg Hx     Colon polyps Neg Hx     Liver cancer Neg Hx     Liver disease Neg Hx     Cirrhosis Neg Hx     Rectal cancer Neg Hx     Stomach cancer Neg Hx     Esophageal cancer Neg Hx     Celiac disease Neg Hx     Inflammatory bowel disease Neg Hx     Crohn's disease Neg Hx      OB History      Para Term  AB Living    0              SAB TAB Ectopic Multiple Live Births                       /70   Ht 5' (1.524 m)   Wt 63.9 kg (140 lb 14 oz)   LMP 04/15/2018   BMI 27.51 kg/m²         GYN & OB History  Patient's last menstrual period was 04/15/2018.   Date of Last Pap: 3/11/2016    OB History    Para Term  AB Living   0             SAB TAB Ectopic Multiple Live Births                         Review of Systems  Review of Systems   Constitutional: Negative for activity change, appetite change, fatigue and unexpected weight change.   HENT: Negative.    Eyes: Negative for visual disturbance.   Respiratory: Negative for shortness of breath and wheezing.    Cardiovascular: Negative for chest pain, palpitations and leg swelling.   Gastrointestinal: Negative for abdominal pain, bloating and blood in stool.   Endocrine: Negative for diabetes and hair loss.   Genitourinary: Negative for decreased libido, dyspareunia and menstrual problem.   Musculoskeletal: Negative for back pain and joint swelling.   Skin:  Negative for no acne and hair changes.   Neurological: Negative for headaches.   Hematological: Does not bruise/bleed easily.   Psychiatric/Behavioral: Negative for depression and sleep disturbance. The patient is not nervous/anxious.    Breast: Negative for breast pain and nipple discharge          Objective:    Physical Exam:   Constitutional: She is oriented to person, place, and time. She appears well-developed and well-nourished.    HENT:   Head: Normocephalic and atraumatic.    Eyes: EOM are normal. Pupils are equal,  round, and reactive to light.    Neck: Normal range of motion. Neck supple.    Cardiovascular: Normal rate and regular rhythm.     Pulmonary/Chest: Effort normal and breath sounds normal.   BREASTS: Symmetrical, no skin changes or visible lesions.  No palpable masses, nipple discharge bilaterally.          Abdominal: Soft. Bowel sounds are normal.     Genitourinary: Pelvic exam was performed with patient supine.   Genitourinary Comments: PELVIC: Normal external genitalia without lesions.  Normal hair distribution.  Adequate perineal body, normal urethral meatus.  Vagina moist and well rugated without lesions or discharge.  Cervix pink, without lesions, discharge or tenderness.  No significant cystocele or rectocele.  Bimanual exam shows uterus to be normal size, regular, mobile and nontender.  Adnexa without masses or tenderness.               Musculoskeletal: Normal range of motion and moves all extremeties.       Neurological: She is alert and oriented to person, place, and time.    Skin: Skin is warm and dry.    Psychiatric: She has a normal mood and affect.          Assessment:        1. Encounter for gynecological examination without abnormal finding    2. Encounter for surveillance of contraceptive pills               Plan:        1. Encounter for gynecological examination without abnormal finding  COUNSELING:  The patient was counseled today on  regular weight bearing exercise. The patient was also counseled today on ACS PAP guidelines, with recommendations for yearly pelvic exams unless their uterus, cervix, and ovaries were removed for benign reasons; in that case, examinations every 3-5 years are recommended. The patient was also counseled regarding monthly breast self-examination, routine STD screening for at-risk populations, prophylactic immunizations for transmitted infections such as HPV, Pertussis, or Influenza as appropriate, and yearly mammograms when indicated by ACS guidelines. She was advised to  see her primary care physician for all other health maintenance.   FOLLOW-UP with me for next routine visit.         2. Encounter for surveillance of contraceptive pills    - MICROGESTIN FE 1/20, 28, 1 mg-20 mcg (21)/75 mg (7) per tablet; Take 1 tablet by mouth once daily.  Dispense: 28 tablet; Refill: 12       Follow-up in about 1 year (around 4/24/2019).

## 2018-05-12 DIAGNOSIS — Z30.40 ENCOUNTER FOR SURVEILLANCE OF CONTRACEPTIVES: ICD-10-CM

## 2018-05-14 RX ORDER — NORETHINDRONE ACETATE AND ETHINYL ESTRADIOL AND FERROUS FUMARATE 1MG-20(21)
1 KIT ORAL DAILY
Qty: 28 TABLET | Refills: 1 | OUTPATIENT
Start: 2018-05-14

## 2018-12-01 ENCOUNTER — PATIENT MESSAGE (OUTPATIENT)
Dept: INTERNAL MEDICINE | Facility: CLINIC | Age: 33
End: 2018-12-01

## 2018-12-01 DIAGNOSIS — K52.9 ILEITIS: Primary | ICD-10-CM

## 2018-12-01 DIAGNOSIS — E78.2 MIXED HYPERLIPIDEMIA: ICD-10-CM

## 2018-12-03 NOTE — TELEPHONE ENCOUNTER
OK for colonoscopy and labs, orders in    Can you please check on the CPT code for the colonoscopy, and let her know by Mishel- thank you thanks

## 2019-02-06 ENCOUNTER — OFFICE VISIT (OUTPATIENT)
Dept: SURGERY | Facility: CLINIC | Age: 34
End: 2019-02-06
Payer: COMMERCIAL

## 2019-02-06 VITALS
HEIGHT: 60 IN | HEART RATE: 91 BPM | DIASTOLIC BLOOD PRESSURE: 66 MMHG | SYSTOLIC BLOOD PRESSURE: 103 MMHG | BODY MASS INDEX: 27.79 KG/M2 | WEIGHT: 141.56 LBS

## 2019-02-06 DIAGNOSIS — K64.8 INTERNAL HEMORRHOIDS: Primary | ICD-10-CM

## 2019-02-06 DIAGNOSIS — K21.9 CHRONIC GERD: ICD-10-CM

## 2019-02-06 PROCEDURE — 99204 PR OFFICE/OUTPT VISIT, NEW, LEVL IV, 45-59 MIN: ICD-10-PCS | Mod: S$GLB,,, | Performed by: NURSE PRACTITIONER

## 2019-02-06 PROCEDURE — 3008F BODY MASS INDEX DOCD: CPT | Mod: CPTII,S$GLB,, | Performed by: NURSE PRACTITIONER

## 2019-02-06 PROCEDURE — 99204 OFFICE O/P NEW MOD 45 MIN: CPT | Mod: S$GLB,,, | Performed by: NURSE PRACTITIONER

## 2019-02-06 PROCEDURE — 99999 PR PBB SHADOW E&M-EST. PATIENT-LVL III: ICD-10-PCS | Mod: PBBFAC,,, | Performed by: NURSE PRACTITIONER

## 2019-02-06 PROCEDURE — 99999 PR PBB SHADOW E&M-EST. PATIENT-LVL III: CPT | Mod: PBBFAC,,, | Performed by: NURSE PRACTITIONER

## 2019-02-06 PROCEDURE — 3008F PR BODY MASS INDEX (BMI) DOCUMENTED: ICD-10-PCS | Mod: CPTII,S$GLB,, | Performed by: NURSE PRACTITIONER

## 2019-02-06 RX ORDER — PANTOPRAZOLE SODIUM 20 MG/1
20 TABLET, DELAYED RELEASE ORAL DAILY
Qty: 30 TABLET | Refills: 3 | Status: SHIPPED | OUTPATIENT
Start: 2019-02-06 | End: 2019-03-08

## 2019-02-06 RX ORDER — MULTIVIT WITH MINERALS/HERBS
1 TABLET ORAL DAILY
COMMUNITY

## 2019-02-06 RX ORDER — MULTIVIT WITH IRON,MINERALS
100 TABLET ORAL NIGHTLY
COMMUNITY
End: 2023-01-26

## 2019-02-06 RX ORDER — ESCITALOPRAM OXALATE 5 MG/1
5 TABLET ORAL NIGHTLY
Refills: 2 | COMMUNITY
Start: 2018-12-20 | End: 2019-12-27

## 2019-02-06 NOTE — PROGRESS NOTES
Subjective:       Patient ID: Luz Muñiz is a 33 y.o. female.    Chief Complaint: No chief complaint on file.    HPI   33 F who presents to clinic for blood in stool. She has seen small streak of blood in her stool for years, always attributed to hemorrhoids. She had one episode of a large amount of blood with wiping after a large, hard stool. It has since resolved. Has not seen blood since. She denies any abdominal pain, rectal pain, change in bowel habits, unexplained weight loss. BMs are usually formed but vary between 2 - 8x a day depending on diet. This has been the case for over 5 years.     No family history of colon or rectal CA or IBD  Colonoscopy 08/2015  Congested mucosa in the terminal ileum. Biopsied.                        - Erythematous mucosa in the terminal ileum. Could                         be related NSAID's.                        - Altered vascular, congested, erythematous and                         ulcerated mucosa in the terminal ileum. Biopsied.                        - One 2 mm polyp in the rectum. Resected and                         retrieved.                        - One 2 mm polyp in the rectum. Resected and                         retrieved.  FINAL PATHOLOGIC DIAGNOSIS    1. TERMINAL ILEUM (BIOPSY):  Detached fibrinopurulent exudate, consistent with biopsy of an ulcer  Background Mild active ileitis  No definite evidence of chronic injury  No viral inclusions (routine and immunostain)    2. RECTUM (BIOPSY):  Hyperplastic polyp ×2  Recommended repeat was in 4 months - but never performed.     Review of Systems   Constitutional: Negative for fatigue, fever and unexpected weight change.   Respiratory: Negative for shortness of breath.    Cardiovascular: Negative for chest pain.   Gastrointestinal: Positive for blood in stool. Negative for abdominal distention, abdominal pain, anal bleeding, constipation, diarrhea, nausea, rectal pain and vomiting.       Objective:      Physical  Exam   Constitutional: She is oriented to person, place, and time. She appears well-developed and well-nourished. No distress.   Eyes: Conjunctivae and EOM are normal.   Pulmonary/Chest: Effort normal. No respiratory distress.   Abdominal: Soft. She exhibits no distension. There is no tenderness.   Genitourinary:   Genitourinary Comments: Normal perianal skin. eversion of anus revealed no abnormality or fissure, LARON revealed no masses, blood or stool in vault, normal sphincter tone, anoscopy revealed grade II hemorrhoids with no bleeding or stigmata of same.     Musculoskeletal: Normal range of motion.   Neurological: She is alert and oriented to person, place, and time.   Skin: Skin is warm and dry.   Psychiatric: She has a normal mood and affect. Her behavior is normal.       Assessment:       1. Internal hemorrhoids    2. Chronic GERD        Plan:       High fiber diet  Fiber supplement  Avoid straining or sitting on the toilet for prolonged periods  Discussed repeating colonoscopy, she states that the cscope cost her over 5K. I think its reasonable to hold off due to no other symptoms, resolved bleeding and obvious internal hemorrhoids on rectal exam .   She is requesting change in meds for GERD, currently on prevacid. Cant get in with GI until April.

## 2019-02-22 NOTE — PROGRESS NOTES
Subjective:       Patient ID: Luz Muñiz is a 33 y.o. female.    Chief Complaint: Annual Exam and Gastroesophageal Reflux    Patient is a 33 y.o.female who presents today for annual      Weekly allergist shots; Dr. Keita      Labs: due now  Gyn: Dr. torres    1 GERD: not working with prevacid; not working well; on it for about one year or so; saw a rectal doctor recently and was given a script for a new med that did not work. She was on nexium for years and nothing has worked since then.    - tried prilosec, zantac, tums  - has had an endoscopy in the past   - follows the diet closely.      2. Back pain: ongoing since 18 years old; has seen ortho and chiropractor; has been flaring recently. Did have xrays two years ago and no signs of arthritis. She does have arthritis in the thumbs. This does run in the family.      Review of Systems   Constitutional: Negative for appetite change, chills, diaphoresis, fatigue and fever.   HENT: Negative for congestion, dental problem, ear discharge, ear pain, hearing loss, postnasal drip, sinus pressure and sore throat.    Eyes: Negative for discharge, redness and itching.   Respiratory: Negative for cough, chest tightness, shortness of breath and wheezing.    Cardiovascular: Negative for chest pain, palpitations and leg swelling.   Gastrointestinal: Negative for abdominal pain, constipation, diarrhea, nausea and vomiting.   Endocrine: Negative for cold intolerance and heat intolerance.   Genitourinary: Negative for difficulty urinating, frequency, hematuria and urgency.   Musculoskeletal: Negative for arthralgias, back pain, gait problem, myalgias and neck pain.   Skin: Negative for color change and rash.   Neurological: Negative for dizziness, syncope and headaches.   Hematological: Negative for adenopathy.   Psychiatric/Behavioral: Negative for behavioral problems and sleep disturbance. The patient is not nervous/anxious.        Objective:      Physical Exam    Constitutional: She is oriented to person, place, and time. She appears well-developed and well-nourished. No distress.   HENT:   Head: Normocephalic and atraumatic.   Right Ear: External ear normal.   Left Ear: External ear normal.   Nose: Nose normal.   Mouth/Throat: Oropharynx is clear and moist. No oropharyngeal exudate.   Eyes: Conjunctivae and EOM are normal. Pupils are equal, round, and reactive to light. Right eye exhibits no discharge. Left eye exhibits no discharge. No scleral icterus.   Neck: Normal range of motion. Neck supple. No JVD present. No thyromegaly present.   Cardiovascular: Normal rate, regular rhythm, normal heart sounds and intact distal pulses. Exam reveals no gallop and no friction rub.   No murmur heard.  Pulmonary/Chest: Effort normal and breath sounds normal. No stridor. No respiratory distress. She has no wheezes. She has no rales. She exhibits no tenderness.   Abdominal: Soft. Bowel sounds are normal. She exhibits no distension. There is no tenderness. There is no rebound.   Musculoskeletal: Normal range of motion. She exhibits no edema or tenderness.   Lymphadenopathy:     She has no cervical adenopathy.   Neurological: She is alert and oriented to person, place, and time. No cranial nerve deficit.   Skin: Skin is warm and dry. No rash noted. She is not diaphoretic. No erythema.   Psychiatric: She has a normal mood and affect. Her behavior is normal.   Nursing note and vitals reviewed.      Assessment and Plan:       1. Annual physical exam    - CBC auto differential; Future  - Comprehensive metabolic panel; Future  - Lipid panel; Future  - Vitamin D; Future  - Urinalysis; Future  - TSH; Future  - YAMILEX Screen w/Reflex; Future  - Anti-DNA antibody, double-stranded; Future  - C-reactive protein; Future  - Cyclic citrul peptide antibody, IgG; Future  - Rheumatoid factor; Future  - Sedimentation rate; Future  - Uric acid; Future    2. Arthralgia, unspecified joint    - YAMILEX Screen  w/Reflex; Future  - Anti-DNA antibody, double-stranded; Future  - C-reactive protein; Future  - Cyclic citrul peptide antibody, IgG; Future  - Rheumatoid factor; Future  - Sedimentation rate; Future  - Uric acid; Future    3. Back pain, unspecified back location, unspecified back pain laterality, unspecified chronicity    - HLA B27 ANTIGEN; Future    4. Hiatal hernia  - stop prevacid; start nexium          No Follow-up on file.

## 2019-03-08 ENCOUNTER — PATIENT MESSAGE (OUTPATIENT)
Dept: INTERNAL MEDICINE | Facility: CLINIC | Age: 34
End: 2019-03-08

## 2019-03-08 ENCOUNTER — LAB VISIT (OUTPATIENT)
Dept: LAB | Facility: HOSPITAL | Age: 34
End: 2019-03-08
Attending: INTERNAL MEDICINE
Payer: COMMERCIAL

## 2019-03-08 ENCOUNTER — OFFICE VISIT (OUTPATIENT)
Dept: INTERNAL MEDICINE | Facility: CLINIC | Age: 34
End: 2019-03-08
Payer: COMMERCIAL

## 2019-03-08 VITALS
WEIGHT: 142 LBS | HEIGHT: 60 IN | RESPIRATION RATE: 16 BRPM | TEMPERATURE: 98 F | DIASTOLIC BLOOD PRESSURE: 64 MMHG | SYSTOLIC BLOOD PRESSURE: 86 MMHG | HEART RATE: 84 BPM | BODY MASS INDEX: 27.88 KG/M2

## 2019-03-08 DIAGNOSIS — Z00.00 ANNUAL PHYSICAL EXAM: Primary | ICD-10-CM

## 2019-03-08 DIAGNOSIS — M25.50 ARTHRALGIA, UNSPECIFIED JOINT: ICD-10-CM

## 2019-03-08 DIAGNOSIS — M54.9 BACK PAIN, UNSPECIFIED BACK LOCATION, UNSPECIFIED BACK PAIN LATERALITY, UNSPECIFIED CHRONICITY: ICD-10-CM

## 2019-03-08 DIAGNOSIS — K44.9 HIATAL HERNIA: ICD-10-CM

## 2019-03-08 DIAGNOSIS — Z00.00 ANNUAL PHYSICAL EXAM: ICD-10-CM

## 2019-03-08 LAB
25(OH)D3+25(OH)D2 SERPL-MCNC: 49 NG/ML
ALBUMIN SERPL BCP-MCNC: 3.6 G/DL
ALP SERPL-CCNC: 49 U/L
ALT SERPL W/O P-5'-P-CCNC: 18 U/L
ANION GAP SERPL CALC-SCNC: 8 MMOL/L
AST SERPL-CCNC: 19 U/L
BASOPHILS # BLD AUTO: 0.08 K/UL
BASOPHILS NFR BLD: 1 %
BILIRUB SERPL-MCNC: 0.5 MG/DL
BUN SERPL-MCNC: 12 MG/DL
CALCIUM SERPL-MCNC: 9.7 MG/DL
CCP AB SER IA-ACNC: <0.5 U/ML
CHLORIDE SERPL-SCNC: 105 MMOL/L
CHOLEST SERPL-MCNC: 295 MG/DL
CHOLEST/HDLC SERPL: 4.9 {RATIO}
CO2 SERPL-SCNC: 25 MMOL/L
CREAT SERPL-MCNC: 0.8 MG/DL
CRP SERPL-MCNC: 9.3 MG/L
DIFFERENTIAL METHOD: ABNORMAL
EOSINOPHIL # BLD AUTO: 0.4 K/UL
EOSINOPHIL NFR BLD: 5 %
ERYTHROCYTE [DISTWIDTH] IN BLOOD BY AUTOMATED COUNT: 13.1 %
ERYTHROCYTE [SEDIMENTATION RATE] IN BLOOD BY WESTERGREN METHOD: 7 MM/HR
EST. GFR  (AFRICAN AMERICAN): >60 ML/MIN/1.73 M^2
EST. GFR  (NON AFRICAN AMERICAN): >60 ML/MIN/1.73 M^2
GLUCOSE SERPL-MCNC: 85 MG/DL
HCT VFR BLD AUTO: 41.1 %
HDLC SERPL-MCNC: 60 MG/DL
HDLC SERPL: 20.3 %
HGB BLD-MCNC: 12.9 G/DL
IMM GRANULOCYTES # BLD AUTO: 0.01 K/UL
IMM GRANULOCYTES NFR BLD AUTO: 0.1 %
LDLC SERPL CALC-MCNC: 205.8 MG/DL
LYMPHOCYTES # BLD AUTO: 2.8 K/UL
LYMPHOCYTES NFR BLD: 36.3 %
MCH RBC QN AUTO: 28.7 PG
MCHC RBC AUTO-ENTMCNC: 31.4 G/DL
MCV RBC AUTO: 91 FL
MONOCYTES # BLD AUTO: 0.6 K/UL
MONOCYTES NFR BLD: 7.8 %
NEUTROPHILS # BLD AUTO: 3.9 K/UL
NEUTROPHILS NFR BLD: 49.8 %
NONHDLC SERPL-MCNC: 235 MG/DL
NRBC BLD-RTO: 0 /100 WBC
PLATELET # BLD AUTO: 358 K/UL
PMV BLD AUTO: 9.3 FL
POTASSIUM SERPL-SCNC: 4.3 MMOL/L
PROT SERPL-MCNC: 7.3 G/DL
RBC # BLD AUTO: 4.5 M/UL
RHEUMATOID FACT SERPL-ACNC: <10 IU/ML
SODIUM SERPL-SCNC: 138 MMOL/L
TRIGL SERPL-MCNC: 146 MG/DL
TSH SERPL DL<=0.005 MIU/L-ACNC: 2.15 UIU/ML
URATE SERPL-MCNC: 4.1 MG/DL
WBC # BLD AUTO: 7.8 K/UL

## 2019-03-08 PROCEDURE — 86140 C-REACTIVE PROTEIN: CPT

## 2019-03-08 PROCEDURE — 81374 HLA I TYPING 1 ANTIGEN LR: CPT | Mod: PO

## 2019-03-08 PROCEDURE — 99999 PR PBB SHADOW E&M-EST. PATIENT-LVL III: ICD-10-PCS | Mod: PBBFAC,,, | Performed by: INTERNAL MEDICINE

## 2019-03-08 PROCEDURE — 84550 ASSAY OF BLOOD/URIC ACID: CPT

## 2019-03-08 PROCEDURE — 99395 PREV VISIT EST AGE 18-39: CPT | Mod: S$GLB,,, | Performed by: INTERNAL MEDICINE

## 2019-03-08 PROCEDURE — 86235 NUCLEAR ANTIGEN ANTIBODY: CPT | Mod: 59

## 2019-03-08 PROCEDURE — 86431 RHEUMATOID FACTOR QUANT: CPT

## 2019-03-08 PROCEDURE — 86038 ANTINUCLEAR ANTIBODIES: CPT

## 2019-03-08 PROCEDURE — 99395 PR PREVENTIVE VISIT,EST,18-39: ICD-10-PCS | Mod: S$GLB,,, | Performed by: INTERNAL MEDICINE

## 2019-03-08 PROCEDURE — 82306 VITAMIN D 25 HYDROXY: CPT

## 2019-03-08 PROCEDURE — 84443 ASSAY THYROID STIM HORMONE: CPT

## 2019-03-08 PROCEDURE — 36415 COLL VENOUS BLD VENIPUNCTURE: CPT | Mod: PO

## 2019-03-08 PROCEDURE — 85025 COMPLETE CBC W/AUTO DIFF WBC: CPT

## 2019-03-08 PROCEDURE — 86225 DNA ANTIBODY NATIVE: CPT

## 2019-03-08 PROCEDURE — 86039 ANTINUCLEAR ANTIBODIES (ANA): CPT

## 2019-03-08 PROCEDURE — 85652 RBC SED RATE AUTOMATED: CPT

## 2019-03-08 PROCEDURE — 86200 CCP ANTIBODY: CPT

## 2019-03-08 PROCEDURE — 99999 PR PBB SHADOW E&M-EST. PATIENT-LVL III: CPT | Mod: PBBFAC,,, | Performed by: INTERNAL MEDICINE

## 2019-03-08 PROCEDURE — 80053 COMPREHEN METABOLIC PANEL: CPT

## 2019-03-08 PROCEDURE — 80061 LIPID PANEL: CPT

## 2019-03-08 RX ORDER — ESOMEPRAZOLE MAGNESIUM 40 MG/1
40 CAPSULE, DELAYED RELEASE ORAL
Qty: 30 CAPSULE | Refills: 11 | Status: SHIPPED | OUTPATIENT
Start: 2019-03-08 | End: 2020-01-24 | Stop reason: SDUPTHER

## 2019-03-09 NOTE — TELEPHONE ENCOUNTER
Ok to take red yeast rice along with niacin. Recheck lipid in 3 months after starting this regimen.     For the inflammation, I would recommend an anti- inflammatory as needed. She can always try a natural anti- inflammatory such as turmeric otc

## 2019-03-11 ENCOUNTER — PATIENT MESSAGE (OUTPATIENT)
Dept: INTERNAL MEDICINE | Facility: CLINIC | Age: 34
End: 2019-03-11

## 2019-03-11 DIAGNOSIS — R76.8 POSITIVE ANA (ANTINUCLEAR ANTIBODY): Primary | ICD-10-CM

## 2019-03-11 LAB
ANA SER QL IF: POSITIVE
ANA TITR SER IF: NORMAL {TITER}
DSDNA AB SER-ACNC: NORMAL [IU]/ML

## 2019-03-12 LAB
ANTI SM ANTIBODY: 1.38 EU
ANTI SM/RNP ANTIBODY: 2.79 EU
ANTI-SM INTERPRETATION: NEGATIVE
ANTI-SM/RNP INTERPRETATION: NEGATIVE
ANTI-SSA ANTIBODY: 5.1 EU
ANTI-SSA INTERPRETATION: NEGATIVE
ANTI-SSB ANTIBODY: 1.91 EU
ANTI-SSB INTERPRETATION: NEGATIVE
DSDNA AB SER-ACNC: NORMAL [IU]/ML

## 2019-03-13 ENCOUNTER — PATIENT MESSAGE (OUTPATIENT)
Dept: INTERNAL MEDICINE | Facility: CLINIC | Age: 34
End: 2019-03-13

## 2019-03-14 ENCOUNTER — PATIENT MESSAGE (OUTPATIENT)
Dept: INTERNAL MEDICINE | Facility: CLINIC | Age: 34
End: 2019-03-14

## 2019-03-20 LAB
HLA-B27 RELATED AG QL: NEGATIVE
HLA-B27 RELATED AG QL: NORMAL

## 2019-03-27 ENCOUNTER — PATIENT MESSAGE (OUTPATIENT)
Dept: INTERNAL MEDICINE | Facility: CLINIC | Age: 34
End: 2019-03-27

## 2019-03-27 RX ORDER — ETODOLAC 400 MG/1
400 TABLET, FILM COATED ORAL EVERY 6 HOURS PRN
Qty: 60 TABLET | Refills: 5 | Status: SHIPPED | OUTPATIENT
Start: 2019-03-27 | End: 2020-08-09

## 2019-05-02 ENCOUNTER — PATIENT MESSAGE (OUTPATIENT)
Dept: OBSTETRICS AND GYNECOLOGY | Facility: CLINIC | Age: 34
End: 2019-05-02

## 2019-05-08 ENCOUNTER — OFFICE VISIT (OUTPATIENT)
Dept: OBSTETRICS AND GYNECOLOGY | Facility: CLINIC | Age: 34
End: 2019-05-08
Payer: COMMERCIAL

## 2019-05-08 VITALS
BODY MASS INDEX: 27.18 KG/M2 | DIASTOLIC BLOOD PRESSURE: 72 MMHG | SYSTOLIC BLOOD PRESSURE: 110 MMHG | HEIGHT: 60 IN | WEIGHT: 138.44 LBS

## 2019-05-08 DIAGNOSIS — Z01.419 ENCOUNTER FOR GYNECOLOGICAL EXAMINATION WITHOUT ABNORMAL FINDING: Primary | ICD-10-CM

## 2019-05-08 DIAGNOSIS — Z12.4 PAP SMEAR FOR CERVICAL CANCER SCREENING: ICD-10-CM

## 2019-05-08 DIAGNOSIS — Z30.41 ENCOUNTER FOR SURVEILLANCE OF CONTRACEPTIVE PILLS: ICD-10-CM

## 2019-05-08 PROCEDURE — 99395 PR PREVENTIVE VISIT,EST,18-39: ICD-10-PCS | Mod: S$GLB,,, | Performed by: OBSTETRICS & GYNECOLOGY

## 2019-05-08 PROCEDURE — 88141 LIQUID-BASED PAP SMEAR, SCREENING: ICD-10-PCS | Mod: ,,, | Performed by: PATHOLOGY

## 2019-05-08 PROCEDURE — 88175 CYTOPATH C/V AUTO FLUID REDO: CPT | Performed by: PATHOLOGY

## 2019-05-08 PROCEDURE — 99999 PR PBB SHADOW E&M-EST. PATIENT-LVL III: ICD-10-PCS | Mod: PBBFAC,,, | Performed by: OBSTETRICS & GYNECOLOGY

## 2019-05-08 PROCEDURE — 88141 CYTOPATH C/V INTERPRET: CPT | Mod: ,,, | Performed by: PATHOLOGY

## 2019-05-08 PROCEDURE — 99395 PREV VISIT EST AGE 18-39: CPT | Mod: S$GLB,,, | Performed by: OBSTETRICS & GYNECOLOGY

## 2019-05-08 PROCEDURE — 87624 HPV HI-RISK TYP POOLED RSLT: CPT

## 2019-05-08 PROCEDURE — 99999 PR PBB SHADOW E&M-EST. PATIENT-LVL III: CPT | Mod: PBBFAC,,, | Performed by: OBSTETRICS & GYNECOLOGY

## 2019-05-08 RX ORDER — NORETHINDRONE ACETATE AND ETHINYL ESTRADIOL AND FERROUS FUMARATE 1MG-20(21)
1 KIT ORAL DAILY
Qty: 28 TABLET | Refills: 12 | Status: SHIPPED | OUTPATIENT
Start: 2019-05-08 | End: 2019-07-24 | Stop reason: ALTCHOICE

## 2019-05-08 NOTE — PROGRESS NOTES
Subjective:       Patient ID: Luz Muñiz is a 33 y.o. female.    Chief Complaint:  Well Woman and Cyst (right thigh)      History of Present Illness  HPI    Luz Muñiz is a 33 y.o. female  here for her annual GYN exam.    She describes her periods as regular, normal flow, lasting 5 days.   denies break through bleeding.   denies vaginal itching or irritation.  Denies vaginal discharge.  She is not currently sexually active.   She uses oral contraceptives (estrogen/progesterone) for contraception.   History of abnormal pap: No  Last Pap: approximate date  and was normal  denies domestic violence. She does feel safe at home.     Past Medical History:   Diagnosis Date    Arthritis     GERD (gastroesophageal reflux disease)     Hyperlipidemia     Seasonal allergies 6/10/2015     Past Surgical History:   Procedure Laterality Date    COLONOSCOPY N/A 2015    Performed by Cornelio Webb MD at [facility] (4TH FLR)    ESOPHAGOGASTRODUODENOSCOPY (EGD) N/A 7/15/2015    [facility] ([facility])    TONSILLECTOMY  2014    WRIST SURGERY      Ligament repair of Right wrist x 2     Social History     Socioeconomic History    Marital status: Single     Spouse name: Not on file    Number of children: Not on file    Years of education: Not on file    Highest education level: Not on file   Occupational History    Occupation:    Social Needs    Financial resource strain: Not on file    Food insecurity:     Worry: Not on file     Inability: Not on file    Transportation needs:     Medical: Not on file     Non-medical: Not on file   Tobacco Use    Smoking status: Never Smoker    Smokeless tobacco: Never Used   Substance and Sexual Activity    Alcohol use: Yes     Alcohol/week: 0.6 oz     Types: 1 Glasses of wine per week     Comment: socially    Drug use: No    Sexual activity: Not Currently     Partners: Male     Birth control/protection:  OCP   Lifestyle    Physical activity:     Days per week: Not on file     Minutes per session: Not on file    Stress: Not on file   Relationships    Social connections:     Talks on phone: Not on file     Gets together: Not on file     Attends Anabaptist service: Not on file     Active member of club or organization: Not on file     Attends meetings of clubs or organizations: Not on file     Relationship status: Not on file   Other Topics Concern    Are you pregnant or think you may be? No    Breast-feeding No   Social History Narrative    Not on file     Family History   Problem Relation Age of Onset    Hyperlipidemia Father     Heart disease Father         valve replacement    Hyperlipidemia Mother     Cancer Maternal Grandmother         skin    No Known Problems Brother     Acne Neg Hx     Colon cancer Neg Hx     Ovarian cancer Neg Hx     Breast cancer Neg Hx     Diabetes Neg Hx     Hypertension Neg Hx     Eclampsia Neg Hx     Miscarriages / Stillbirths Neg Hx      labor Neg Hx     Stroke Neg Hx     Colon polyps Neg Hx     Liver cancer Neg Hx     Liver disease Neg Hx     Cirrhosis Neg Hx     Rectal cancer Neg Hx     Stomach cancer Neg Hx     Esophageal cancer Neg Hx     Celiac disease Neg Hx     Inflammatory bowel disease Neg Hx     Crohn's disease Neg Hx      OB History        0    Para        Term                AB        Living           SAB        TAB        Ectopic        Multiple        Live Births                     /72   Ht 5' (1.524 m)   Wt 62.8 kg (138 lb 7.2 oz)   LMP 04/15/2019   BMI 27.04 kg/m²         GYN & OB History  Patient's last menstrual period was 04/15/2019.   Date of Last Pap: 3/11/2016    OB History    Para Term  AB Living   0             SAB TAB Ectopic Multiple Live Births                   Review of Systems  Review of Systems   Constitutional: Negative for activity change, appetite change, fatigue and unexpected  weight change.   HENT: Negative.    Eyes: Negative for visual disturbance.   Respiratory: Negative for shortness of breath and wheezing.    Cardiovascular: Negative for chest pain, palpitations and leg swelling.   Gastrointestinal: Negative for abdominal pain, bloating and blood in stool.   Endocrine: Negative for diabetes and hair loss.   Genitourinary: Negative for decreased libido, dyspareunia, menorrhagia and menstrual problem.   Musculoskeletal: Negative for back pain and joint swelling.   Integumentary:  Negative for acne, hair changes and nipple discharge.   Neurological: Negative for headaches.   Hematological: Does not bruise/bleed easily.   Psychiatric/Behavioral: Negative for depression and sleep disturbance. The patient is not nervous/anxious.    Breast: Negative for mastodynia and nipple discharge          Objective:      Physical Exam:   Constitutional: She is oriented to person, place, and time. She appears well-developed and well-nourished.    HENT:   Head: Normocephalic and atraumatic.    Eyes: Pupils are equal, round, and reactive to light. EOM are normal.    Neck: Normal range of motion. Neck supple.    Cardiovascular: Normal rate and regular rhythm.     Pulmonary/Chest: Effort normal and breath sounds normal.   BREASTS:  no mass, no tenderness, no deformity and no retraction. Right breast exhibits no inverted nipple, no mass, no nipple discharge, no skin change, no tenderness, no bleeding and no swelling. Left breast exhibits no inverted nipple, no mass, no nipple discharge, no skin change, no tenderness, no bleeding and no swelling. Breasts are symmetrical.              Abdominal: Soft. Bowel sounds are normal.     Genitourinary:       Pelvic exam was performed with patient supine.   Genitourinary Comments: PELVIC: Normal external genitalia without lesions. Sebaceous cyst, nonindurated , nonerythematous in Right upper inner thigh.Normal hair distribution.  Adequate perineal body, normal urethral  meatus.  Vagina moist and well rugated without lesions or discharge.  Cervix pink, without lesions, discharge or tenderness.  No significant cystocele or rectocele.  Bimanual exam shows uterus to be normal size, regular, mobile and nontender.  Adnexa without masses or tenderness.               Musculoskeletal: Normal range of motion and moves all extremeties.       Neurological: She is alert and oriented to person, place, and time.    Skin: Skin is warm and dry.    Psychiatric: She has a normal mood and affect.              Assessment:        1. Encounter for gynecological examination without abnormal finding    2. Pap smear for cervical cancer screening    3. Encounter for surveillance of contraceptive pills                Plan:        1. Encounter for gynecological examination without abnormal finding  COUNSELING:  The patient was counseled today on regular weight bearing exercise. Patient was counseled today on the new ACS guidelines for cervical cytology screening as well as the current recommendations for breast cancer screening. Counseling session lasted approximately 10 minutes, and all her questions were answered. She was advised to see her primary care physician for all other health maintenance.   FOLLOW-UP with me for next routine visit.         2. Pap smear for cervical cancer screening    - Liquid-based pap smear, screening  - HPV High Risk Genotypes, PCR    3. Encounter for surveillance of contraceptive pills    - MICROGESTIN FE 1/20, 28, 1 mg-20 mcg (21)/75 mg (7) per tablet; Take 1 tablet by mouth once daily.  Dispense: 28 tablet; Refill: 12       Follow up in about 1 year (around 5/8/2020).

## 2019-05-14 LAB
HPV HR 12 DNA CVX QL NAA+PROBE: NEGATIVE
HPV16 AG SPEC QL: NEGATIVE
HPV18 DNA SPEC QL NAA+PROBE: NEGATIVE

## 2019-05-27 ENCOUNTER — PATIENT MESSAGE (OUTPATIENT)
Dept: OBSTETRICS AND GYNECOLOGY | Facility: CLINIC | Age: 34
End: 2019-05-27

## 2019-05-28 ENCOUNTER — TELEPHONE (OUTPATIENT)
Dept: OBSTETRICS AND GYNECOLOGY | Facility: CLINIC | Age: 34
End: 2019-05-28

## 2019-05-28 RX ORDER — CEPHALEXIN 250 MG/1
250 CAPSULE ORAL EVERY 12 HOURS
Qty: 14 CAPSULE | Refills: 0 | Status: SHIPPED | OUTPATIENT
Start: 2019-05-28 | End: 2019-06-04

## 2019-05-28 NOTE — TELEPHONE ENCOUNTER
I will send a Rx for Keflex to her pharmacy.  She will need to follow-up with Dr. Gordon if not resolved.

## 2019-05-28 NOTE — TELEPHONE ENCOUNTER
Would you be able to call in an antibiotic for one of Dr Gordon's patients for a  Sebaceous cyst, nonindurated , nonerythematous in Right upper inner thigh. Pt stated that it flared up and Dr Gordon stated that she would call in an antibiotic if it came back.

## 2019-06-04 ENCOUNTER — PATIENT MESSAGE (OUTPATIENT)
Dept: OBSTETRICS AND GYNECOLOGY | Facility: CLINIC | Age: 34
End: 2019-06-04

## 2019-06-22 ENCOUNTER — PATIENT MESSAGE (OUTPATIENT)
Dept: ENDOSCOPY | Facility: HOSPITAL | Age: 34
End: 2019-06-22

## 2019-07-24 ENCOUNTER — PATIENT MESSAGE (OUTPATIENT)
Dept: OBSTETRICS AND GYNECOLOGY | Facility: CLINIC | Age: 34
End: 2019-07-24

## 2019-07-24 DIAGNOSIS — Z30.41 SURVEILLANCE FOR BIRTH CONTROL, ORAL CONTRACEPTIVES: Primary | ICD-10-CM

## 2019-07-24 RX ORDER — DESOGESTREL AND ETHINYL ESTRADIOL 21-5 (28)
1 KIT ORAL DAILY
Qty: 28 TABLET | Refills: 11 | Status: SHIPPED | OUTPATIENT
Start: 2019-07-24 | End: 2020-07-13

## 2019-08-05 ENCOUNTER — PATIENT MESSAGE (OUTPATIENT)
Dept: INTERNAL MEDICINE | Facility: CLINIC | Age: 34
End: 2019-08-05

## 2019-08-12 NOTE — PROGRESS NOTES
Subjective:       Patient ID: Luz Muñiz is a 33 y.o. female.    Chief Complaint: mri and xray request    Patient is a 33 y.o.female who presents today for follow up. She saw a rheumatologist a few months ago and was diagnosed with hypermobility. She does complain of a hx of back pain and would like this further investigated.       -Back pain: ongoing since 18 years old; has seen ortho and chiropractor; has been flaring recently. Did have xrays two years ago and no signs of arthritis. She does have arthritis in the thumbs. This does run in the family.      -Low back and  Hip pain are on and off. Every 3-6 months it flares. When it goes out, it lasts 5 days at a time and she cannot work. She thinks lifting furniture makes it flare but at times there is no exacerbating factor. Has been 3 years since x-rays were done. she has never done PT for her back in the past.     -Wants to be checked for ethler danlos syndrome; wants to see a genetics to be tested for it.     Having trouble losing weight; works out 3-4 times per week and eats healthy  Review of Systems   Constitutional: Negative for appetite change, chills, diaphoresis and fever.   HENT: Negative for congestion, ear discharge, ear pain, postnasal drip, tinnitus, trouble swallowing and voice change.    Eyes: Negative for discharge, redness and itching.   Respiratory: Negative for cough, chest tightness, shortness of breath and wheezing.    Cardiovascular: Negative for chest pain, palpitations and leg swelling.   Gastrointestinal: Negative for abdominal pain, constipation, diarrhea, nausea and vomiting.   Endocrine: Negative for cold intolerance and heat intolerance.   Genitourinary: Positive for pelvic pain. Negative for difficulty urinating, flank pain, hematuria and urgency.   Musculoskeletal: Negative for arthralgias, gait problem, myalgias and neck stiffness.   Skin: Negative for color change and rash.   Neurological: Positive for weakness. Negative  for dizziness, seizures, syncope and headaches.   Hematological: Negative for adenopathy.   Psychiatric/Behavioral: Negative for agitation, behavioral problems, confusion and sleep disturbance.       Objective:      Physical Exam   Constitutional: She is oriented to person, place, and time. She appears well-developed and well-nourished. No distress.   HENT:   Head: Normocephalic and atraumatic.   Right Ear: External ear normal.   Left Ear: External ear normal.   Nose: Nose normal.   Mouth/Throat: Oropharynx is clear and moist. No oropharyngeal exudate.   Eyes: Pupils are equal, round, and reactive to light. Conjunctivae and EOM are normal. Right eye exhibits no discharge. Left eye exhibits no discharge. No scleral icterus.   Neck: Neck supple. No JVD present. No tracheal deviation present. No thyromegaly present.   Cardiovascular: Normal rate, normal heart sounds and intact distal pulses. Exam reveals no gallop and no friction rub.   No murmur heard.  Pulmonary/Chest: Effort normal and breath sounds normal. No stridor. No respiratory distress. She has no wheezes. She has no rales. She exhibits no tenderness.   Abdominal: Soft. Bowel sounds are normal. She exhibits no distension. There is no tenderness. There is no rebound.   Musculoskeletal: She exhibits no edema or tenderness.   Lymphadenopathy:     She has no cervical adenopathy.   Neurological: She is alert and oriented to person, place, and time.   Skin: Skin is warm and dry. No rash noted. She is not diaphoretic. No erythema.   Psychiatric: She has a normal mood and affect. Her behavior is normal.   Nursing note and vitals reviewed.      Assessment and Plan:       1. Lumbar radiculopathy    - X-Ray Lumbar Spine Ap And Lateral; Future    2. Thoracic back pain, unspecified back pain laterality, unspecified chronicity    - X-Ray Thoracic Spine AP Lateral; Future    3. Hypermobility of joint  - Ambulatory Referral to Genetics    4. Weight gain  - THYROID PEROXIDASE  ANTIBODY; Future  - TSH; Future  - T4, free; Future          No follow-ups on file.

## 2019-08-19 ENCOUNTER — OFFICE VISIT (OUTPATIENT)
Dept: INTERNAL MEDICINE | Facility: CLINIC | Age: 34
End: 2019-08-19
Payer: COMMERCIAL

## 2019-08-19 ENCOUNTER — HOSPITAL ENCOUNTER (OUTPATIENT)
Dept: RADIOLOGY | Facility: HOSPITAL | Age: 34
Discharge: HOME | End: 2019-08-19
Attending: INTERNAL MEDICINE
Payer: COMMERCIAL

## 2019-08-19 VITALS
HEIGHT: 60 IN | TEMPERATURE: 98 F | SYSTOLIC BLOOD PRESSURE: 102 MMHG | WEIGHT: 144.81 LBS | RESPIRATION RATE: 16 BRPM | BODY MASS INDEX: 28.43 KG/M2 | HEART RATE: 86 BPM | DIASTOLIC BLOOD PRESSURE: 73 MMHG

## 2019-08-19 DIAGNOSIS — M54.6 THORACIC BACK PAIN, UNSPECIFIED BACK PAIN LATERALITY, UNSPECIFIED CHRONICITY: ICD-10-CM

## 2019-08-19 DIAGNOSIS — M54.16 LUMBAR RADICULOPATHY: Primary | ICD-10-CM

## 2019-08-19 DIAGNOSIS — M54.16 LUMBAR RADICULOPATHY: ICD-10-CM

## 2019-08-19 DIAGNOSIS — M24.9 HYPERMOBILITY OF JOINT: ICD-10-CM

## 2019-08-19 DIAGNOSIS — R63.5 WEIGHT GAIN: ICD-10-CM

## 2019-08-19 PROCEDURE — 99214 PR OFFICE/OUTPT VISIT, EST, LEVL IV, 30-39 MIN: ICD-10-PCS | Mod: S$GLB,,, | Performed by: INTERNAL MEDICINE

## 2019-08-19 PROCEDURE — 99214 OFFICE O/P EST MOD 30 MIN: CPT | Mod: S$GLB,,, | Performed by: INTERNAL MEDICINE

## 2019-08-19 PROCEDURE — 99999 PR PBB SHADOW E&M-EST. PATIENT-LVL IV: ICD-10-PCS | Mod: PBBFAC,,, | Performed by: INTERNAL MEDICINE

## 2019-08-19 PROCEDURE — 72070 XR THORACIC SPINE AP LATERAL: ICD-10-PCS | Mod: 26,,, | Performed by: RADIOLOGY

## 2019-08-19 PROCEDURE — 3008F PR BODY MASS INDEX (BMI) DOCUMENTED: ICD-10-PCS | Mod: CPTII,S$GLB,, | Performed by: INTERNAL MEDICINE

## 2019-08-19 PROCEDURE — 99999 PR PBB SHADOW E&M-EST. PATIENT-LVL IV: CPT | Mod: PBBFAC,,, | Performed by: INTERNAL MEDICINE

## 2019-08-19 PROCEDURE — 72100 XR LUMBAR SPINE AP AND LATERAL: ICD-10-PCS | Mod: 26,,, | Performed by: RADIOLOGY

## 2019-08-19 PROCEDURE — 72070 X-RAY EXAM THORAC SPINE 2VWS: CPT | Mod: TC,PO

## 2019-08-19 PROCEDURE — 72070 X-RAY EXAM THORAC SPINE 2VWS: CPT | Mod: 26,,, | Performed by: RADIOLOGY

## 2019-08-19 PROCEDURE — 72100 X-RAY EXAM L-S SPINE 2/3 VWS: CPT | Mod: TC,PO

## 2019-08-19 PROCEDURE — 3008F BODY MASS INDEX DOCD: CPT | Mod: CPTII,S$GLB,, | Performed by: INTERNAL MEDICINE

## 2019-08-19 PROCEDURE — 72100 X-RAY EXAM L-S SPINE 2/3 VWS: CPT | Mod: 26,,, | Performed by: RADIOLOGY

## 2019-08-19 RX ORDER — LORAZEPAM 0.5 MG/1
0.5 TABLET ORAL 2 TIMES DAILY
Refills: 2 | COMMUNITY
Start: 2019-07-11 | End: 2019-09-09

## 2019-08-19 RX ORDER — LAMOTRIGINE 25 MG/1
50 TABLET ORAL 2 TIMES DAILY
Refills: 3 | COMMUNITY
Start: 2019-08-10 | End: 2023-01-26

## 2019-08-20 ENCOUNTER — PATIENT MESSAGE (OUTPATIENT)
Dept: INTERNAL MEDICINE | Facility: CLINIC | Age: 34
End: 2019-08-20

## 2019-08-20 DIAGNOSIS — R93.7 ABNORMAL X-RAY OF THORACIC SPINE: ICD-10-CM

## 2019-08-20 DIAGNOSIS — M54.6 THORACIC BACK PAIN, UNSPECIFIED BACK PAIN LATERALITY, UNSPECIFIED CHRONICITY: Primary | ICD-10-CM

## 2019-08-28 ENCOUNTER — HOSPITAL ENCOUNTER (OUTPATIENT)
Dept: RADIOLOGY | Facility: HOSPITAL | Age: 34
Discharge: HOME | End: 2019-08-28
Attending: INTERNAL MEDICINE
Payer: COMMERCIAL

## 2019-08-28 DIAGNOSIS — R93.7 ABNORMAL X-RAY OF THORACIC SPINE: ICD-10-CM

## 2019-08-28 DIAGNOSIS — M54.6 THORACIC BACK PAIN, UNSPECIFIED BACK PAIN LATERALITY, UNSPECIFIED CHRONICITY: ICD-10-CM

## 2019-08-28 PROCEDURE — 72146 MRI THORACIC SPINE WITHOUT CONTRAST: ICD-10-PCS | Mod: 26,,, | Performed by: RADIOLOGY

## 2019-08-28 PROCEDURE — 72146 MRI CHEST SPINE W/O DYE: CPT | Mod: TC

## 2019-08-28 PROCEDURE — 72146 MRI CHEST SPINE W/O DYE: CPT | Mod: 26,,, | Performed by: RADIOLOGY

## 2019-08-30 ENCOUNTER — PATIENT MESSAGE (OUTPATIENT)
Dept: INTERNAL MEDICINE | Facility: CLINIC | Age: 34
End: 2019-08-30

## 2019-08-30 NOTE — TELEPHONE ENCOUNTER
I would recommend she see back and spine to determine if there is any arthritic change to her spine. Please send referral to coordinator again to schedule patient

## 2019-09-03 ENCOUNTER — PATIENT MESSAGE (OUTPATIENT)
Dept: INTERNAL MEDICINE | Facility: CLINIC | Age: 34
End: 2019-09-03

## 2019-09-05 ENCOUNTER — TELEPHONE (OUTPATIENT)
Dept: SURGERY | Facility: CLINIC | Age: 34
End: 2019-09-05

## 2019-09-05 NOTE — TELEPHONE ENCOUNTER
I saw this patient was referred to me for genetic counseling secondary to hypermobility of the joints.  I only perform genetic counseling pertaining to cancer.  I called and discussed this with the patient and provided her with a website where she can locate local genetic counselors who might better fit her needs.  She did not wish to be seen here today and denied a personal or family history of cancer.  Patient expressed gratitude for my call.  Notified Dr. Dorota Prasad.

## 2019-09-06 NOTE — PROGRESS NOTES
Subjective:      Patient ID: Luz Muñiz is a 33 y.o. female.    Chief Complaint: Low-back Pain    Ms Muñiz is a 32 yo female sent in consulation by Dr. Prasad for evaluation of low back pain.  She has had the pain for over 10 years since she was 19.  She felt like her back went out 6 weeks ago and went out for a week.  She feels like her back goes off a couple of months ago.  She has pain in the lower back . She does not have upper back pain.  The pain will move from the left to the right.  It goes into the glute and sometimes the hip.  The pain changes.  Sometimes walking makes it better or worse.  She feels like sitting or standing too long can make it worse.  She feels like working out can aggravate it.  She has left knee pain.  She also has arthritis in the thumbs.  The pain is a burning pain and sometimes it feels cramped.  She will feel a pop and can get relief at times.  She has been seeing a chiropractor every 2 weeks.  She has been riding her back and weight training on her own.  She has stopped running.  She has not been to PT.  She does stretch daily.  She uses CBD oil daily.  She will take etodolac as needed, once a month.  She has been taking tramadol as needed, she had a rash when took vicodin last time.  The pain is 3/10 now, worst 6/10 in the morning, best 0/10 good week.  When pain is gone it is gone.  She does have popping in the left knee that has been for several years.    X-ray thoracic  There is mild DJD.  Alignment is normal.  No fracture dislocation bone destruction seen.    Impression      Mild DJD.    MRI thoracic 8/2019  Thoracic vertebral bodies demonstrate no evidence of fracture, osseous destructive process, or aggressive bone marrow replacement process.    Normal sagittal alignment is preserved.    Intervertebral disc space heights are well-maintained. No focal disc abnormality, spinal canal stenosis, or neural foraminal narrowing is evident at any level.    Visualized  spinal cord demonstrates normal caliber, morphology, and signal.    No spinal canal or paraspinous mass.  Visualized paraspinous soft tissue structures are normal.    Impression      Normal examination of the thoracic spine.    X-ray lumbar  Alignment is normal.  No fracture dislocation bone destruction seen.  Disc spaces are maintained.    Impression      No acute process seen.    Past Medical History:  No date: Arthritis  No date: GERD (gastroesophageal reflux disease)  No date: Hyperlipidemia  6/10/2015: Seasonal allergies    Past Surgical History:  8/21/2015: COLONOSCOPY; N/A      Comment:  Performed by Cornelio Webb MD at [facility] END[facility] (4TH FLR)  7/15/2015: ESOPHAGOGASTRODUODENOSCOPY (EGD); N/A      Comment:  Performed by Cornelio Webb MD at [facility] END[facility] (4TH FLR)  2014: TONSILLECTOMY  No date: WRIST SURGERY      Comment:  Ligament repair of Right wrist x 2    Review of patient's family history indicates:  Problem: Hyperlipidemia      Relation: Father          Age of Onset: (Not Specified)  Problem: Heart disease      Relation: Father          Age of Onset: (Not Specified)          Comment: valve replacement  Problem: Hyperlipidemia      Relation: Mother          Age of Onset: (Not Specified)  Problem: Cancer      Relation: Maternal Grandmother          Age of Onset: (Not Specified)          Comment: skin  Problem: No Known Problems      Relation: Brother          Age of Onset: (Not Specified)  Problem: Acne      Relation: Neg Hx          Age of Onset: (Not Specified)  Problem: Colon cancer      Relation: Neg Hx          Age of Onset: (Not Specified)  Problem: Ovarian cancer      Relation: Neg Hx          Age of Onset: (Not Specified)  Problem: Breast cancer      Relation: Neg Hx          Age of Onset: (Not Specified)  Problem: Diabetes      Relation: Neg Hx          Age of Onset: (Not Specified)  Problem: Hypertension      Relation: Neg Hx          Age of Onset: (Not Specified)  Problem: Eclampsia       Relation: Neg Hx          Age of Onset: (Not Specified)  Problem: Miscarriages / Stillbirths      Relation: Neg Hx          Age of Onset: (Not Specified)  Problem:  labor      Relation: Neg Hx          Age of Onset: (Not Specified)  Problem: Stroke      Relation: Neg Hx          Age of Onset: (Not Specified)  Problem: Colon polyps      Relation: Neg Hx          Age of Onset: (Not Specified)  Problem: Liver cancer      Relation: Neg Hx          Age of Onset: (Not Specified)  Problem: Liver disease      Relation: Neg Hx          Age of Onset: (Not Specified)  Problem: Cirrhosis      Relation: Neg Hx          Age of Onset: (Not Specified)  Problem: Rectal cancer      Relation: Neg Hx          Age of Onset: (Not Specified)  Problem: Stomach cancer      Relation: Neg Hx          Age of Onset: (Not Specified)  Problem: Esophageal cancer      Relation: Neg Hx          Age of Onset: (Not Specified)  Problem: Celiac disease      Relation: Neg Hx          Age of Onset: (Not Specified)  Problem: Inflammatory bowel disease      Relation: Neg Hx          Age of Onset: (Not Specified)  Problem: Crohn's disease      Relation: Neg Hx          Age of Onset: (Not Specified)      Social History    Socioeconomic History      Marital status: Single      Spouse name: Not on file      Number of children: Not on file      Years of education: Not on file      Highest education level: Not on file    Occupational History      Occupation:     Social Needs      Financial resource strain: Somewhat hard      Food insecurity:        Worry: Never true        Inability: Never true      Transportation needs:        Medical: No        Non-medical: No    Tobacco Use      Smoking status: Never Smoker      Smokeless tobacco: Never Used    Substance and Sexual Activity      Alcohol use: Yes        Alcohol/week: 0.6 oz        Types: 1 Glasses of wine per week        Frequency: 2-4 times a month        Drinks per session: 1 or 2         Binge frequency: Never        Comment: socially      Drug use: No      Sexual activity: Not Currently        Partners: Male        Birth control/protection: OCP    Lifestyle      Physical activity:        Days per week: 3 days        Minutes per session: 70 min      Stress: To some extent    Relationships      Social connections:        Talks on phone: Not on file        Gets together: Twice a week        Attends Christianity service: Not on file        Active member of club or organization: No        Attends meetings of clubs or organizations: Never        Relationship status: Never     Other Topics      Concerns:        Are you pregnant or think you may be?: No        Breast-feeding: No    Social History Narrative      Not on file      Current Outpatient Medications:  azelastine (ASTELIN) 137 mcg (0.1 %) nasal spray, 1 spray by Nasal route 2 (two) times daily., Disp: , Rfl: 5  b complex vitamins tablet, Take 1 tablet by mouth once daily., Disp: , Rfl:    CALCIUM ORAL, Take by mouth., Disp: , Rfl:   CETIRIZINE HCL (ZYRTEC ORAL), Take by mouth., Disp: , Rfl:   desog-e.estradiol/e.estradiol (KARIVA) 0.15-0.02 mgx21 /0.01 mg x 5 per tablet, Take 1 tablet by mouth once daily., Disp: 28 tablet, Rfl: 11  escitalopram oxalate (LEXAPRO) 10 MG tablet, Take 10 mg by mouth nightly., Disp: , Rfl: 3  escitalopram oxalate (LEXAPRO) 5 MG Tab, Take 5 mg by mouth every evening. Totaling 8 mg with liquid form, Disp: , Rfl: 2  esomeprazole (NEXIUM) 40 MG capsule, Take 1 capsule (40 mg total) by mouth before breakfast., Disp: 30 capsule, Rfl: 11  etodolac (LODINE) 400 MG tablet, Take 1 tablet (400 mg total) by mouth every 6 (six) hours as needed (pain)., Disp: 60 tablet, Rfl: 5  lamoTRIgine (LAMICTAL) 25 MG tablet, Take 50 mg by mouth 2 (two) times daily., Disp: , Rfl: 3  niacin 100 MG Tab, Take 100 mg by mouth every evening., Disp: , Rfl:   traMADol (ULTRAM) 50 mg tablet, TAKE 1 TABLET BY MOUTH EVERY 4 HOURS AS NEEDED PAIN, Disp:  , Rfl: 0  LORazepam (ATIVAN) 0.5 MG tablet, Take 0.5 mg by mouth 2 (two) times daily., Disp: , Rfl: 2    No current facility-administered medications for this visit.       Review of patient's allergies indicates:   -- Codeine -- Rash        Review of Systems   Constitution: Negative for weight gain and weight loss.   Cardiovascular: Negative for chest pain.   Respiratory: Negative for shortness of breath.    Musculoskeletal: Positive for back pain and joint pain (left knee and thumbs). Negative for joint swelling.   Gastrointestinal: Negative for abdominal pain, bowel incontinence, nausea and vomiting.   Genitourinary: Negative for bladder incontinence.   Neurological: Negative for numbness and paresthesias.         Objective:        General: Luz Bill is well-developed, well-nourished, appears stated age, in no acute distress, alert and oriented to time, place and person.     General    Vitals reviewed.  Constitutional: She is oriented to person, place, and time. She appears well-developed and well-nourished.   HENT:   Head: Normocephalic and atraumatic.   Pulmonary/Chest: Effort normal.   Neurological: She is alert and oriented to person, place, and time.   Psychiatric: She has a normal mood and affect. Her behavior is normal. Judgment and thought content normal.     General Musculoskeletal Exam   Gait: normal     Right Ankle/Foot Exam     Tests   Heel Walk: able to perform  Tiptoe Walk: able to perform    Left Ankle/Foot Exam     Tests   Heel Walk: able to perform  Tiptoe Walk: able to perform  Back (L-Spine & T-Spine) / Neck (C-Spine) Exam     Tenderness Left paramedian tenderness of the Sacrum.     Back (L-Spine & T-Spine) Range of Motion   Extension: 20   Flexion: 90   Lateral bend right: 20   Lateral bend left: 20   Rotation right: 40   Rotation left: 40     Spinal Sensation   Right Side Sensation  L-Spine Level: normal  S-Spine Level: normal  Left Side Sensation  L-Spine Level: normal  S-Spine Level:  normal    Back (L-Spine & T-Spine) Tests   Right Side Tests  Straight leg raise:      Sitting SLR: > 70 degrees      Left Side Tests  Straight leg raise:     Sitting SLR: > 70 degrees          Other She has no scoliosis .  Spinal Kyphosis:  Absent    Comments:  Pos JOEL on left with left back pain      Muscle Strength   Right Lower Extremity   Hip Flexion: 5/5   Quadriceps:  5/5   Anterior tibial:  5/5/5  EHL:  5/5  Left Lower Extremity   Hip Flexion: 5/5   Quadriceps:  5/5   Anterior tibial:  5/5/5   EHL:  5/5    Reflexes     Left Side  Biceps:  2+  Triceps:  2+  Brachioradialis:  2+  Quadriceps:  2+  Achilles:  2+  Left Watters's Sign:  Absent  Babinski Sign:  absent    Right Side   Biceps:  2+  Triceps:  2+  Brachioradialis:  2+  Quadriceps:  2+  Achilles:  2+  Right Watters's Sign:  absent  Babinski Sign:  absent    Vascular Exam     Right Pulses        Carotid:                  2+    Left Pulses        Carotid:                  2+              Assessment:       1. Chronic bilateral low back pain without sciatica           Plan:       Orders Placed This Encounter    Ambulatory consult to [facility]     1. We discussed back pain and the nature of back pain.  We discussed that it is not one thing that causes the pain but an accumulation of multiple things that we do.   2. We discussed posture sitting and the importance of trying to sit better.  We discussed maintaining a curve in the lower back  3. We discussed the benefits of therapy and exercise and continuing to move.  4.  MRI of the thoracic spine was reviewed with her and her x-rays.  She does wonder about MRI of the lumbar spine. Discussed I would recommend MRI at this point.  She is not having radiating pain.  And the pain moves from side to side and changes.    5.  Pt for progressive resistance exercise pattern 1 lumbar at healthy back  6.  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  RTC 4  months    More than 50% of the total time  of 45 minutes was spent face to face in counseling on diagnosis and treatment options. I also counseled patient  on common and most usual side effect of prescribed medications.  I reviewed Primary care , and other specialty's notes to better coordinate patient's care. All questions were answered, and patient voiced understanding.     A consultation note will be sent to Dr. Prasad through epic.  Thank you for the consult    Follow-up: No follow-ups on file. If there are any questions prior to this, the patient was instructed to contact the office.

## 2019-09-09 ENCOUNTER — PATIENT MESSAGE (OUTPATIENT)
Dept: INTERNAL MEDICINE | Facility: CLINIC | Age: 34
End: 2019-09-09

## 2019-09-09 ENCOUNTER — OFFICE VISIT (OUTPATIENT)
Dept: SPINE | Facility: CLINIC | Age: 34
End: 2019-09-09
Attending: PHYSICAL MEDICINE & REHABILITATION
Payer: COMMERCIAL

## 2019-09-09 VITALS
TEMPERATURE: 98 F | DIASTOLIC BLOOD PRESSURE: 70 MMHG | BODY MASS INDEX: 28.17 KG/M2 | WEIGHT: 143.5 LBS | HEART RATE: 80 BPM | SYSTOLIC BLOOD PRESSURE: 112 MMHG | HEIGHT: 60 IN

## 2019-09-09 DIAGNOSIS — M54.50 CHRONIC BILATERAL LOW BACK PAIN WITHOUT SCIATICA: Primary | ICD-10-CM

## 2019-09-09 DIAGNOSIS — M54.16 LUMBAR RADICULOPATHY: Primary | ICD-10-CM

## 2019-09-09 DIAGNOSIS — G89.29 CHRONIC BILATERAL LOW BACK PAIN WITHOUT SCIATICA: Primary | ICD-10-CM

## 2019-09-09 PROCEDURE — 99999 PR PBB SHADOW E&M-EST. PATIENT-LVL IV: CPT | Mod: PBBFAC,,, | Performed by: PHYSICAL MEDICINE & REHABILITATION

## 2019-09-09 PROCEDURE — 99243 OFF/OP CNSLTJ NEW/EST LOW 30: CPT | Mod: S$GLB,,, | Performed by: PHYSICAL MEDICINE & REHABILITATION

## 2019-09-09 PROCEDURE — 99999 PR PBB SHADOW E&M-EST. PATIENT-LVL IV: ICD-10-PCS | Mod: PBBFAC,,, | Performed by: PHYSICAL MEDICINE & REHABILITATION

## 2019-09-09 PROCEDURE — 99243 PR OFFICE CONSULTATION,LEVEL III: ICD-10-PCS | Mod: S$GLB,,, | Performed by: PHYSICAL MEDICINE & REHABILITATION

## 2019-09-09 RX ORDER — TRAMADOL HYDROCHLORIDE 50 MG/1
TABLET ORAL
Refills: 0 | COMMUNITY
Start: 2019-08-16 | End: 2023-01-26

## 2019-09-09 RX ORDER — ESCITALOPRAM OXALATE 10 MG/1
10 TABLET ORAL NIGHTLY
Refills: 3 | COMMUNITY
Start: 2019-08-19 | End: 2020-09-28 | Stop reason: SDUPTHER

## 2019-09-09 NOTE — LETTER
September 9, 2019      Dorota Prasad, DO  [address] [facility] [place] [address]           [address]  [address], [address]  [facility] [address]  Phone: [phone]  Fax: [phone]          Patient: Luz Muñiz   MR Number: [id number]   YOB: 1985   Date of Visit: 9/9/2019       Dear Dr. Dorota Prasad:    Thank you for referring Luz Muñiz to me for evaluation. Attached you will find relevant portions of my assessment and plan of care.    If you have questions, please do not hesitate to call me. I look forward to following Luz Muñiz along with you.    Sincerely,    Abi Martinez MD    Enclosure  CC:  No Recipients    If you would like to receive this communication electronically, please contact [email or web address] or [phone] to request more information on [employer] [facility] Link access.    For providers and/or their staff who would like to refer a patient to [facility], please contact us through our one-stop-shop provider referral line, [facility], at [phone].    If you feel you have received this communication in error or would no longer like to receive these types of communications, please e-mail [email or web address]

## 2019-09-10 ENCOUNTER — PATIENT MESSAGE (OUTPATIENT)
Dept: INTERNAL MEDICINE | Facility: CLINIC | Age: 34
End: 2019-09-10

## 2019-09-25 ENCOUNTER — PATIENT MESSAGE (OUTPATIENT)
Dept: INTERNAL MEDICINE | Facility: CLINIC | Age: 34
End: 2019-09-25

## 2019-09-25 RX ORDER — ONDANSETRON 4 MG/1
4 TABLET, FILM COATED ORAL EVERY 8 HOURS PRN
Qty: 30 TABLET | Refills: 0 | Status: SHIPPED | OUTPATIENT
Start: 2019-09-25 | End: 2021-10-06 | Stop reason: SDUPTHER

## 2019-09-29 ENCOUNTER — PATIENT MESSAGE (OUTPATIENT)
Dept: INTERNAL MEDICINE | Facility: CLINIC | Age: 34
End: 2019-09-29

## 2019-09-29 DIAGNOSIS — K44.9 HIATAL HERNIA: Primary | ICD-10-CM

## 2019-10-02 ENCOUNTER — PATIENT OUTREACH (OUTPATIENT)
Dept: ADMINISTRATIVE | Facility: OTHER | Age: 34
End: 2019-10-02

## 2019-10-04 ENCOUNTER — INITIAL CONSULT (OUTPATIENT)
Dept: ORTHOPEDICS | Facility: CLINIC | Age: 34
End: 2019-10-04
Payer: COMMERCIAL

## 2019-10-04 VITALS — BODY MASS INDEX: 28.31 KG/M2 | HEIGHT: 60 IN | WEIGHT: 144.19 LBS

## 2019-10-04 DIAGNOSIS — M54.16 LUMBAR RADICULOPATHY: Primary | ICD-10-CM

## 2019-10-04 PROCEDURE — 99999 PR PBB SHADOW E&M-EST. PATIENT-LVL III: ICD-10-PCS | Mod: PBBFAC,,, | Performed by: ORTHOPAEDIC SURGERY

## 2019-10-04 PROCEDURE — 99999 PR PBB SHADOW E&M-EST. PATIENT-LVL III: CPT | Mod: PBBFAC,,, | Performed by: ORTHOPAEDIC SURGERY

## 2019-10-04 PROCEDURE — 99204 OFFICE O/P NEW MOD 45 MIN: CPT | Mod: S$GLB,,, | Performed by: ORTHOPAEDIC SURGERY

## 2019-10-04 PROCEDURE — 3008F PR BODY MASS INDEX (BMI) DOCUMENTED: ICD-10-PCS | Mod: CPTII,S$GLB,, | Performed by: ORTHOPAEDIC SURGERY

## 2019-10-04 PROCEDURE — 3008F BODY MASS INDEX DOCD: CPT | Mod: CPTII,S$GLB,, | Performed by: ORTHOPAEDIC SURGERY

## 2019-10-04 PROCEDURE — 99204 PR OFFICE/OUTPT VISIT, NEW, LEVL IV, 45-59 MIN: ICD-10-PCS | Mod: S$GLB,,, | Performed by: ORTHOPAEDIC SURGERY

## 2019-10-04 NOTE — PROGRESS NOTES
DATE: 10/4/2019  PATIENT: Luz Muñiz    Attending Physician: Freeman Jenkins M.D.    CHIEF COMPLAINT: LBP    HISTORY:  Luz Muñiz is a 33 y.o. female  here for initial evaluation of low back and left leg pain (Back - 8, Leg - 2). The pain has been present for many years and has been progressive over the past 3 months. The patient describes the pain as sharp.  She cannot tell what makes the pain worse as it comes and goes randomly and improved by chiropractor adjustments. There is no associated numbness and tingling. There is no subjective weakness. Prior treatments have included chiropractor adjustments, NSAIDs, but no injections or PT.    The Patient denies myelopathic symptoms such as handwriting changes or difficulty with buttons/coins/keys. Denies perineal paresthesias, bowel/bladder dysfunction.    PAST MEDICAL/SURGICAL HISTORY:  Past Medical History:   Diagnosis Date    Arthritis     GERD (gastroesophageal reflux disease)     Hyperlipidemia     Seasonal allergies 6/10/2015     Past Surgical History:   Procedure Laterality Date    TONSILLECTOMY  2014    WRIST SURGERY      Ligament repair of Right wrist x 2       Current Medications:   Current Outpatient Medications:     azelastine (ASTELIN) 137 mcg (0.1 %) nasal spray, 1 spray by Nasal route 2 (two) times daily., Disp: , Rfl: 5    b complex vitamins tablet, Take 1 tablet by mouth once daily., Disp: , Rfl:     CALCIUM ORAL, Take by mouth., Disp: , Rfl:     CETIRIZINE HCL (ZYRTEC ORAL), Take by mouth., Disp: , Rfl:     desog-e.estradiol/e.estradiol (KARIVA) 0.15-0.02 mgx21 /0.01 mg x 5 per tablet, Take 1 tablet by mouth once daily., Disp: 28 tablet, Rfl: 11    escitalopram oxalate (LEXAPRO) 10 MG tablet, Take 10 mg by mouth nightly., Disp: , Rfl: 3    escitalopram oxalate (LEXAPRO) 5 MG Tab, Take 5 mg by mouth every evening. Totaling 8 mg with liquid form, Disp: , Rfl: 2    esomeprazole (NEXIUM) 40 MG capsule, Take 1 capsule (40  mg total) by mouth before breakfast., Disp: 30 capsule, Rfl: 11    etodolac (LODINE) 400 MG tablet, Take 1 tablet (400 mg total) by mouth every 6 (six) hours as needed (pain)., Disp: 60 tablet, Rfl: 5    lamoTRIgine (LAMICTAL) 25 MG tablet, Take 50 mg by mouth 2 (two) times daily., Disp: , Rfl: 3    niacin 100 MG Tab, Take 100 mg by mouth every evening., Disp: , Rfl:     ondansetron (ZOFRAN) 4 MG tablet, Take 1 tablet (4 mg total) by mouth every 8 (eight) hours as needed for Nausea., Disp: 30 tablet, Rfl: 0    traMADol (ULTRAM) 50 mg tablet, TAKE 1 TABLET BY MOUTH EVERY 4 HOURS AS NEEDED PAIN, Disp: , Rfl: 0    Social History:   Social History     Socioeconomic History    Marital status: Single     Spouse name: Not on file    Number of children: Not on file    Years of education: Not on file    Highest education level: Not on file   Occupational History    Occupation:    Social Needs    Financial resource strain: Somewhat hard    Food insecurity:     Worry: Never true     Inability: Never true    Transportation needs:     Medical: No     Non-medical: No   Tobacco Use    Smoking status: Never Smoker    Smokeless tobacco: Never Used   Substance and Sexual Activity    Alcohol use: Yes     Alcohol/week: 1.0 standard drinks     Types: 1 Glasses of wine per week     Frequency: 2-4 times a month     Drinks per session: 1 or 2     Binge frequency: Never     Comment: socially    Drug use: No    Sexual activity: Not Currently     Partners: Male     Birth control/protection: OCP   Lifestyle    Physical activity:     Days per week: 3 days     Minutes per session: 70 min    Stress: To some extent   Relationships    Social connections:     Talks on phone: Not on file     Gets together: Twice a week     Attends [employer] service: Not on file     Active member of club or organization: No     Attends meetings of clubs or organizations: Never     Relationship status: Never    Other Topics  Concern    Are you pregnant or think you may be? No    Breast-feeding No   Social History Narrative    Not on file       REVIEW OF SYSTEMS:  Constitution: Negative. Negative for chills, fever and night sweats.   Cardiovascular: Negative for chest pain and syncope.   Respiratory: Negative for cough and shortness of breath.   Gastrointestinal: See HPI. Negative for nausea/vomiting. Negative for abdominal pain.  Genitourinary: See HPI. Negative for discoloration or dysuria.  Hematologic/Lymphatic: neg for bleeding/clotting disorders.   Musculoskeletal: Negative for falls and muscle weakness.   Neurological: See HPI. no history of seizures. no history of cranial surgery or shunts.  Neurological: See HPI. No seizures.   Endocrine: Negative for polydipsia, polyphagia and polyuria.   Allergic/Immunologic: Negative for hives and persistent infections.     EXAM:  Ht 5' (1.524 m)   Wt 65.4 kg (144 lb 2.9 oz)   BMI 28.16 kg/m²     PHYSICAL EXAMINATION:    General: The patient is a  33 y.o. female in no apparent distress, the patient is orientatied to person, place and time.  Psych: Normal mood and affect  HEENT: Vision grossly intact, hearing intact to the spoken word.  Lungs: Respirations unlabored.  Gait: Normal station and gait, no difficulty with toe or heel walk.   Skin: Dorsal lumbar skin negative for rashes, lesions, hairy patches and surgical scars. There is no lumbar tenderness to palpation.  Range of motion: Lumbar range of motion is acceptable.  Spinal Balance: Global saggital and coronal spinal balance acceptable, no significant for scoliosis and kyphosis.  Musculoskeletal: No pain with the range of motion of the bilateral hips. No trochanteric tenderness to palpation.  Vascular: Bilateral lower extremities warm and well perfused, Dorsalis pedis pulses 2+ bilaterally.  Neurological: Normal strength and tone in all major motor groups in the bilateral lower extremities. Normal sensation to light touch in the  L2-S1 dermatomes bilaterally.  Deep tendon reflexes symmetric and intact in the bilateral lower extremities.  Negative Babinski bilaterally. Straight leg raise negative bilaterally.    IMAGING:      Today I personally reviewed AP, Lat and Flex/Ex  upright L-spine that demonstrate no bony abnormalities       Body mass index is 28.16 kg/m².  No results found for: HGBA1C    ASSESSMENT/PLAN:    Luz Bill was seen today for low-back pain.    Diagnoses and all orders for this visit:    Lumbar radiculopathy  -     MRI Lumbar Spine Without Contrast; Future      Will obtain MRI L-spine and see back to review results

## 2019-10-04 NOTE — LETTER
October 12, 2019      Amanda Saldana Jr., NARCISA  [address] [facility] [address]           [facility]  [address]  [place] [address]  Phone: [phone]          Patient: Luz Muñiz   MR Number: [id number]   YOB: 1985   Date of Visit: 10/4/2019       Dear Amanda Saldana Jr.:    Thank you for referring Luz Muñiz to me for evaluation. Attached you will find relevant portions of my assessment and plan of care.    If you have questions, please do not hesitate to call me. I look forward to following Luz Muñiz along with you.    Sincerely,    Freeman Jenkins MD    Enclosure  CC:  No Recipients    If you would like to receive this communication electronically, please contact [email or web address] or [phone] to request more information on [employer] Link access.    For providers and/or their staff who would like to refer a patient to [facility], please contact us through our one-stop-shop provider referral line, [facility], at [phone].    If you feel you have received this communication in error or would no longer like to receive these types of communications, please e-mail [email or web address]

## 2019-10-09 ENCOUNTER — PATIENT MESSAGE (OUTPATIENT)
Dept: ORTHOPEDICS | Facility: CLINIC | Age: 34
End: 2019-10-09

## 2019-10-09 ENCOUNTER — HOSPITAL ENCOUNTER (EMERGENCY)
Facility: HOSPITAL | Age: 34
Discharge: HOME | End: 2019-10-09
Attending: EMERGENCY MEDICINE
Payer: COMMERCIAL

## 2019-10-09 VITALS
RESPIRATION RATE: 20 BRPM | DIASTOLIC BLOOD PRESSURE: 74 MMHG | HEIGHT: 60 IN | BODY MASS INDEX: 28.47 KG/M2 | SYSTOLIC BLOOD PRESSURE: 122 MMHG | HEART RATE: 74 BPM | TEMPERATURE: 98 F | OXYGEN SATURATION: 100 % | WEIGHT: 145 LBS

## 2019-10-09 DIAGNOSIS — R07.89 CHEST WALL PAIN: Primary | ICD-10-CM

## 2019-10-09 DIAGNOSIS — R07.9 CHEST PAIN: ICD-10-CM

## 2019-10-09 LAB
ALBUMIN SERPL BCP-MCNC: 3.6 G/DL (ref 3.5–5.2)
ALP SERPL-CCNC: 74 U/L (ref 55–135)
ALT SERPL W/O P-5'-P-CCNC: 18 U/L (ref 10–44)
ANION GAP SERPL CALC-SCNC: 10 MMOL/L (ref 8–16)
AST SERPL-CCNC: 22 U/L (ref 10–40)
B-HCG UR QL: NEGATIVE
BASOPHILS # BLD AUTO: 0.06 K/UL (ref 0–0.2)
BASOPHILS NFR BLD: 0.8 % (ref 0–1.9)
BILIRUB SERPL-MCNC: 0.3 MG/DL (ref 0.1–1)
BNP SERPL-MCNC: 24 PG/ML (ref 0–99)
BUN SERPL-MCNC: 10 MG/DL (ref 6–20)
CALCIUM SERPL-MCNC: 9 MG/DL (ref 8.7–10.5)
CHLORIDE SERPL-SCNC: 105 MMOL/L (ref 95–110)
CO2 SERPL-SCNC: 22 MMOL/L (ref 23–29)
CREAT SERPL-MCNC: 0.7 MG/DL (ref 0.5–1.4)
CTP QC/QA: YES
DIFFERENTIAL METHOD: ABNORMAL
EOSINOPHIL # BLD AUTO: 0.5 K/UL (ref 0–0.5)
EOSINOPHIL NFR BLD: 6.5 % (ref 0–8)
ERYTHROCYTE [DISTWIDTH] IN BLOOD BY AUTOMATED COUNT: 13.1 % (ref 11.5–14.5)
EST. GFR  (AFRICAN AMERICAN): >60 ML/MIN/1.73 M^2
EST. GFR  (NON AFRICAN AMERICAN): >60 ML/MIN/1.73 M^2
GLUCOSE SERPL-MCNC: 89 MG/DL (ref 70–110)
HCT VFR BLD AUTO: 37.9 % (ref 37–48.5)
HGB BLD-MCNC: 12.1 G/DL (ref 12–16)
IMM GRANULOCYTES # BLD AUTO: 0.01 K/UL (ref 0–0.04)
IMM GRANULOCYTES NFR BLD AUTO: 0.1 % (ref 0–0.5)
LIPASE SERPL-CCNC: 34 U/L (ref 4–60)
LYMPHOCYTES # BLD AUTO: 2.9 K/UL (ref 1–4.8)
LYMPHOCYTES NFR BLD: 40.1 % (ref 18–48)
MCH RBC QN AUTO: 28.7 PG (ref 27–31)
MCHC RBC AUTO-ENTMCNC: 31.9 G/DL (ref 32–36)
MCV RBC AUTO: 90 FL (ref 82–98)
MONOCYTES # BLD AUTO: 0.8 K/UL (ref 0.3–1)
MONOCYTES NFR BLD: 11.3 % (ref 4–15)
NEUTROPHILS # BLD AUTO: 3 K/UL (ref 1.8–7.7)
NEUTROPHILS NFR BLD: 41.2 % (ref 38–73)
NRBC BLD-RTO: 0 /100 WBC
PLATELET # BLD AUTO: 307 K/UL (ref 150–350)
PMV BLD AUTO: 9 FL (ref 9.2–12.9)
POTASSIUM SERPL-SCNC: 3.8 MMOL/L (ref 3.5–5.1)
PROT SERPL-MCNC: 7.2 G/DL (ref 6–8.4)
RBC # BLD AUTO: 4.22 M/UL (ref 4–5.4)
SODIUM SERPL-SCNC: 137 MMOL/L (ref 136–145)
TROPONIN I SERPL DL<=0.01 NG/ML-MCNC: <0.006 NG/ML (ref 0–0.03)
WBC # BLD AUTO: 7.24 K/UL (ref 3.9–12.7)

## 2019-10-09 PROCEDURE — 83690 ASSAY OF LIPASE: CPT

## 2019-10-09 PROCEDURE — 93010 ELECTROCARDIOGRAM REPORT: CPT | Mod: ,,, | Performed by: INTERNAL MEDICINE

## 2019-10-09 PROCEDURE — 93010 EKG 12-LEAD: ICD-10-PCS | Mod: ,,, | Performed by: INTERNAL MEDICINE

## 2019-10-09 PROCEDURE — 93005 ELECTROCARDIOGRAM TRACING: CPT

## 2019-10-09 PROCEDURE — 85025 COMPLETE CBC W/AUTO DIFF WBC: CPT

## 2019-10-09 PROCEDURE — 81025 URINE PREGNANCY TEST: CPT | Performed by: PHYSICIAN ASSISTANT

## 2019-10-09 PROCEDURE — 99285 EMERGENCY DEPT VISIT HI MDM: CPT | Mod: 25

## 2019-10-09 PROCEDURE — 83880 ASSAY OF NATRIURETIC PEPTIDE: CPT

## 2019-10-09 PROCEDURE — 25000003 PHARM REV CODE 250: Performed by: PHYSICIAN ASSISTANT

## 2019-10-09 PROCEDURE — 80053 COMPREHEN METABOLIC PANEL: CPT

## 2019-10-09 PROCEDURE — 99284 EMERGENCY DEPT VISIT MOD MDM: CPT | Mod: ,,, | Performed by: PHYSICIAN ASSISTANT

## 2019-10-09 PROCEDURE — 84484 ASSAY OF TROPONIN QUANT: CPT

## 2019-10-09 PROCEDURE — 99284 PR EMERGENCY DEPT VISIT,LEVEL IV: ICD-10-PCS | Mod: ,,, | Performed by: PHYSICIAN ASSISTANT

## 2019-10-09 RX ORDER — LIDOCAINE 50 MG/G
1 PATCH TOPICAL ONCE
Status: DISCONTINUED | OUTPATIENT
Start: 2019-10-09 | End: 2019-10-10 | Stop reason: HOSPADM

## 2019-10-09 RX ADMIN — LIDOCAINE 1 PATCH: 50 PATCH TOPICAL at 08:10

## 2019-10-09 RX ADMIN — ALUMINUM HYDROXIDE, MAGNESIUM HYDROXIDE, AND SIMETHICONE 50 ML: 200; 200; 20 SUSPENSION ORAL at 08:10

## 2019-10-10 NOTE — ED NOTES
"Luz Muñiz, an 33 y.o. female presents to the ED c/o "sharp" chest pain around her breastbone that radiates to her R side rib and and lower shoulder. Pt states chest pain has been going on for a week and 2 days ago the pain was located to her sternum. Pt also reports nausea for 1.5 weeks. Troubled passing stool (LBM today) states she was "starining". Pt got worried last night when she felt " constriction" to her chest and she couldn't breathe. Pt reports " a little trouble breathing".      Chief Complaint   Patient presents with    Chest Pain     cp x 1 week ago that radiates to back. Hx of hiatal hernia. "The pain never been this bad". also reports SOB     Review of patient's allergies indicates:   Allergen Reactions    Codeine Rash    Vicodin [hydrocodone-acetaminophen] Hives     Past Medical History:   Diagnosis Date    Arthritis     GERD (gastroesophageal reflux disease)     Hyperlipidemia     Seasonal allergies 6/10/2015         LOC: The patient is awake, alert, aware of environment with an appropriate affect. Oriented x4, speaking appropriately  APPEARANCE: Pt resting comfortably, in no acute distress, pt is clean and well groomed, clothing properly fastened  SKIN:The skin is warm and dry, color consistent with ethnicity, patient has normal skin turgor and moist mucus membranes  RESPIRATORY:Airway is open and patent, respirations are spontaneous, patient has a normal effort and rate, no accessory muscle use noted. Reports sob  CARDIAC: Normal rate and rhythm, no peripheral edema noted, capillary refill < 3 seconds, bilateral radial pulses 2+. Reports sharp breastbone pain.  ABDOMEN: Soft, non tender, non distended.   NEUROLOGIC: PERRLA, facial expression is symmetrical, patient moving all extremities spontaneously, normal sensation in all extremities when touched with a finger.  Follows all commands appropriately  MUSCULOSKELETAL: Patient moving all extremities spontaneously, no obvious " swelling or deformities noted.

## 2019-10-10 NOTE — ED PROVIDER NOTES
"Encounter Date: 10/9/2019       History     Chief Complaint   Patient presents with    Chest Pain     cp x 1 week ago that radiates to back. Hx of hiatal hernia. "The pain never been this bad". also reports SOB     7:29 PM  Patient is a 33-year-old female with a history back pain on tramadol, GERD, hiatal hernia, hyperlipidemia who presents the ED with chest pain. Patient states for the past 1.5-2 weeks, she has been having constant chest pain located to her sternal region that radiates over her right breast, right lateral ribs, and right scapula.  She states abducting her shoulder makes her pain worse.  Denies any injury, trauma, or heavy lifting.  Has had intermittent coughing spells, shortness of breath, nausea without vomiting.  Denies any true fever, chills, or diaphoresis.  Last bowel movement this morning.  No recent travel, surgery, hormone use, history of PE/DVT.  Has been taking ibuprofen for pain relief, but denies any chronic NSAID use or alcohol use.  Took Tums recently without improvement her symptoms.  Drinks once a month.  Use tramadol twice this week for her low back pain for which she has MRI lumbar spine scheduled on 10/25.    Future Appointments  10/25/2019 8:45 AM    [facility]-MRI2              [facility] MRI IC    Imaging Ctr  10/25/2019 10:45 AM   Freeman Jenkins MD       [facility] SPINE     [address]  10/30/2019 11:00 AM   Jeffery Spring MD        [facility] GASTRO    [facility]          Review of patient's allergies indicates:   Allergen Reactions    Codeine Rash    Vicodin [hydrocodone-acetaminophen] Hives     Past Medical History:   Diagnosis Date    Arthritis     GERD (gastroesophageal reflux disease)     Hyperlipidemia     Seasonal allergies 6/10/2015     Past Surgical History:   Procedure Laterality Date    TONSILLECTOMY  2014    WRIST SURGERY      Ligament repair of Right wrist x 2     Family History   Problem Relation Age of Onset    Hyperlipidemia Father     Heart disease Father        "  valve replacement    Hyperlipidemia Mother     Cancer Maternal Grandmother         skin    No Known Problems Brother     Acne Neg Hx     Colon cancer Neg Hx     Ovarian cancer Neg Hx     Breast cancer Neg Hx     Diabetes Neg Hx     Hypertension Neg Hx     Eclampsia Neg Hx     Miscarriages / Stillbirths Neg Hx      labor Neg Hx     Stroke Neg Hx     Colon polyps Neg Hx     Liver cancer Neg Hx     Liver disease Neg Hx     Cirrhosis Neg Hx     Rectal cancer Neg Hx     Stomach cancer Neg Hx     Esophageal cancer Neg Hx     Celiac disease Neg Hx     Inflammatory bowel disease Neg Hx     Crohn's disease Neg Hx      Social History     Tobacco Use    Smoking status: Never Smoker    Smokeless tobacco: Never Used   Substance Use Topics    Alcohol use: Yes     Alcohol/week: 1.0 standard drinks     Types: 1 Glasses of wine per week     Frequency: 2-4 times a month     Drinks per session: 1 or 2     Binge frequency: Never     Comment: socially    Drug use: No     Review of Systems   Constitutional: Negative for chills, diaphoresis and fever.   HENT: Negative for ear pain and sore throat.    Eyes: Negative for photophobia.   Respiratory: Positive for cough and shortness of breath.    Cardiovascular: Positive for chest pain.   Gastrointestinal: Positive for nausea. Negative for abdominal pain and vomiting.   Endocrine: Negative for polyphagia.   Genitourinary: Negative for dysuria, frequency and hematuria.   Musculoskeletal: Positive for back pain.   Skin: Negative for rash.   Neurological: Negative for weakness and headaches.   Hematological: Does not bruise/bleed easily.       Physical Exam     Initial Vitals [10/09/19 1905]   BP Pulse Resp Temp SpO2   122/76 82 18 97.9 °F (36.6 °C) 100 %      MAP       --         Physical Exam    Vitals reviewed.  Constitutional: She appears well-developed and well-nourished. She is not diaphoretic. No distress.   HENT:   Head: Normocephalic and atraumatic.    Nose: Nose normal.   Eyes: Conjunctivae and EOM are normal.   Neck: Normal range of motion.   Cardiovascular: Normal rate, regular rhythm and normal heart sounds. Exam reveals no friction rub.    No murmur heard.  No peripheral edema or calf tenderness.   Pulmonary/Chest: Breath sounds normal. No accessory muscle usage. No tachypnea. No respiratory distress. She has no wheezes. She has no rales. She exhibits bony tenderness. She exhibits no tenderness.       Speaking in clear and full sentences.  No coughing or bronchospasms noted.   Abdominal: Soft. Bowel sounds are normal. She exhibits no distension. There is no tenderness. There is no rebound.   Musculoskeletal: Normal range of motion.        Back:    Neurological: She is alert and oriented to person, place, and time. She has normal strength. No sensory deficit.   Skin: Skin is warm and dry. No erythema. No pallor.   Skin without rashes, wounds, lacerations, ecchymosis, or erythema.   Psychiatric: She has a normal mood and affect. Her behavior is normal. Judgment and thought content normal.         ED Course   Procedures  Labs Reviewed   CBC W/ AUTO DIFFERENTIAL - Abnormal; Notable for the following components:       Result Value    Mean Corpuscular Hemoglobin Conc 31.9 (*)     MPV 9.0 (*)     All other components within normal limits   COMPREHENSIVE METABOLIC PANEL - Abnormal; Notable for the following components:    CO2 22 (*)     All other components within normal limits   B-TYPE NATRIURETIC PEPTIDE   TROPONIN I   LIPASE   POCT URINE PREGNANCY        ECG Results          EKG 12-lead (Final result)  Result time 10/10/19 14:31:33    Final result by Interface, Lab In [facility] (10/10/19 14:31:33)                 Narrative:    Test Reason : R07.9,    Vent. Rate : 084 BPM     Atrial Rate : 084 BPM     P-R Int : 148 ms          QRS Dur : 082 ms      QT Int : 386 ms       P-R-T Axes : 072 096 077 degrees     QTc Int : 456 ms    Normal sinus rhythm  Rightward  axis  Borderline Abnormal ECG  No previous ECGs available  Confirmed by CEZAR RANDLE MD ([id number]) on 10/10/2019 2:31:26 PM    Referred By: [email or web address]   SELF           Confirmed By:CEZAR RANDLE MD                            Imaging Results          X-Ray Chest PA And Lateral (Final result)  Result time 10/09/19 20:26:51    Final result by Barrett Palma MD (10/09/19 20:26:51)                 Impression:      No acute cardiopulmonary process.      Electronically signed by: Barrett Palma MD  Date:    10/09/2019  Time:    20:26             Narrative:    EXAMINATION:  XR CHEST PA AND LATERAL    CLINICAL HISTORY:  Chest pain, unspecified    TECHNIQUE:  PA and lateral views of the chest were performed.    COMPARISON:  July 16, 2015.    FINDINGS:  There is no consolidation, effusion, or pneumothorax.    Cardiomediastinal silhouette is unremarkable.    Regional osseous structures are unremarkable.                                 Medical Decision Making:   History:   Old Medical Records: I decided to obtain old medical records.  Old Records Summarized: records from previous admission(s) and records from clinic visits.  Initial Assessment:   Patient is a 33-year-old female with a history back pain on tramadol, GERD, hiatal hernia, hyperlipidemia who presents the ED with chest pain.  Differential Diagnosis:   Chest wall tenderness noted on exam which was her pain that she is complaining about.  Differential diagnosis includes but is not limited to musculoskeletal pain such as chest wall strain, costochondritis, GERD, and less likely ACS or heart failure.  No risk factors for PE.  I have a low suspicion. She is PERC negative.  Clinical Tests:   Lab Tests: Reviewed and Ordered  Radiological Study: Ordered and Reviewed  Medical Tests: Reviewed  ED Management:  Will initiate workup to rule out life-threatening or emergent causes, give GI cocktail for suspected acid reflux and apply Lidoderm patch to chronic low back pain, and  continue monitor.  UPT negative.   EKG with NSR at 84 bpm.  No STEMI.  No signs of right heart strain.  CBC with no leukocytosis or anemia.  CMP with no electrolyte abnormalities.  No kidney injury.  No transaminitis.    BNP within normal limits at 24.    Negative troponin.    Lipase within normal limits at 34.    Chest x-ray with no acute cardiopulmonary process.    Patient updated with results.  Given her history, physical exam, and workup, her symptoms are most likely due to musculoskeletal pain. I discussed etiology.  I advised  OTC acetaminophen for pain relief.  Ice.  Follow up if symptoms not improve.  Return to ED precautions given.  All questions were answered.  Patient comfortable with plan and stable for discharge.      I have reviewed patient's chart and discussed this case with my supervising MD.     Kelsie Alicea PA-C  Emergent Department  [facility]  Spectralink #[phone] or #[phone]                        Clinical Impression:       ICD-10-CM ICD-9-CM   1. Chest wall pain R07.89 786.52   2. Chest pain R07.9 786.50         Disposition:   Disposition: Discharged  Condition: Stable                        Kelsie Alicea PA-C  10/10/19 0793

## 2019-10-10 NOTE — DISCHARGE INSTRUCTIONS
Your labs and imaging are unremarkable.    Continue take your proton pump inhibitor, Nexium, for your acid reflux symptoms.    Take Tylenol for your chest wall pain.  Apply heat to the area of pain for 10 min 4 times a day.    Call and follow up closely with your primary care physician and other specialists if your symptoms do not improve or return promptly to the emergency department for new or concerning symptoms.    Future Appointments   Date Time Provider Department Center   10/25/2019  8:45 AM [facility] OIC-MRI2 [facility] MRI IC Imaging Ctr   10/25/2019 10:45 AM Freeman Jenkins MD [facility] SPINE [address]   10/30/2019 11:00 AM Jeffery Spring MD [facility] GASTRO [facility]       Our goal in the emergency department is to always give you outstanding care and exceptional service. You may receive a survey by mail or e-mail in the next week regarding your experience in our ED. We would greatly appreciate your completing and returning the survey. Your feedback provides us with a way to recognize our staff who give very good care and it helps us learn how to improve when your experience was below our aspiration of excellence.

## 2019-10-21 ENCOUNTER — PATIENT MESSAGE (OUTPATIENT)
Dept: ORTHOPEDICS | Facility: CLINIC | Age: 34
End: 2019-10-21

## 2019-10-28 ENCOUNTER — PATIENT OUTREACH (OUTPATIENT)
Dept: ADMINISTRATIVE | Facility: OTHER | Age: 34
End: 2019-10-28

## 2019-10-30 ENCOUNTER — OFFICE VISIT (OUTPATIENT)
Dept: GASTROENTEROLOGY | Facility: CLINIC | Age: 34
End: 2019-10-30
Payer: COMMERCIAL

## 2019-10-30 VITALS — BODY MASS INDEX: 28.24 KG/M2 | WEIGHT: 144.63 LBS

## 2019-10-30 DIAGNOSIS — K44.9 HIATAL HERNIA: ICD-10-CM

## 2019-10-30 DIAGNOSIS — K21.9 GASTROESOPHAGEAL REFLUX DISEASE, ESOPHAGITIS PRESENCE NOT SPECIFIED: ICD-10-CM

## 2019-10-30 DIAGNOSIS — R13.10 DYSPHAGIA, UNSPECIFIED TYPE: Primary | ICD-10-CM

## 2019-10-30 PROCEDURE — 3008F BODY MASS INDEX DOCD: CPT | Mod: CPTII,S$GLB,, | Performed by: INTERNAL MEDICINE

## 2019-10-30 PROCEDURE — 3008F PR BODY MASS INDEX (BMI) DOCUMENTED: ICD-10-PCS | Mod: CPTII,S$GLB,, | Performed by: INTERNAL MEDICINE

## 2019-10-30 PROCEDURE — 99999 PR PBB SHADOW E&M-EST. PATIENT-LVL III: ICD-10-PCS | Mod: PBBFAC,,, | Performed by: INTERNAL MEDICINE

## 2019-10-30 PROCEDURE — 99999 PR PBB SHADOW E&M-EST. PATIENT-LVL III: CPT | Mod: PBBFAC,,, | Performed by: INTERNAL MEDICINE

## 2019-10-30 PROCEDURE — 99214 OFFICE O/P EST MOD 30 MIN: CPT | Mod: S$GLB,,, | Performed by: INTERNAL MEDICINE

## 2019-10-30 PROCEDURE — 99214 PR OFFICE/OUTPT VISIT, EST, LEVL IV, 30-39 MIN: ICD-10-PCS | Mod: S$GLB,,, | Performed by: INTERNAL MEDICINE

## 2019-10-30 NOTE — PROGRESS NOTES
Subjective:       Patient ID: Luz Muñiz is a 33 y.o. female.    Chief Complaint: Gastroesophageal Reflux    This is a 33-year-old female here for a follow-up visit regarding reflux disease, dysphagia with a history of a hiatal hernia. She has had the symptoms for over 10 years the they have changed recently.  Over the last month she has noted some increasing chest discomfort which prompted an emergency room visit.  Records reviewed.  She has been on PPI therapy for a history of reflux described as a burning sensation now with chest discomfort in the mid chest.  Initially she was told this was suspected to be esophageal spasm, she does have family members with this history as well. Dysphagia to predominantly pills is also associated with a sensation of them sticking in the suprasternal region.  No vomiting or nausea. No other exacerbating or relieving factors or other associated symptoms.    The following portions of the patient's history were reviewed and updated as appropriate: allergies, current medications, past family history, past medical history, past social history, past surgical history and problem list.    (Portions of this note were dictated using voice recognition software and may contain dictation related errors in spelling/grammar/syntax not found on text review)  HPI  Review of Systems   Constitutional: Negative for appetite change and unexpected weight change.   HENT: Positive for trouble swallowing.    Respiratory: Negative for wheezing and stridor.    Cardiovascular: Positive for chest pain. Negative for leg swelling.   Gastrointestinal: Negative for abdominal pain and anal bleeding.         Objective:      Physical Exam   Constitutional: She is oriented to person, place, and time. She appears well-developed and well-nourished. No distress.   HENT:   Head: Normocephalic and atraumatic.   Eyes: Conjunctivae are normal. No scleral icterus.   Neck: Normal range of motion. Neck supple. No  tracheal deviation present. No thyromegaly present.   Cardiovascular: Normal rate and regular rhythm. Exam reveals no gallop and no friction rub.   Pulmonary/Chest: Effort normal and breath sounds normal. No respiratory distress. She has no wheezes.   Abdominal: Soft. Bowel sounds are normal. She exhibits no distension. There is no tenderness.   Musculoskeletal:        Right wrist: She exhibits normal range of motion and no tenderness.        Left wrist: She exhibits normal range of motion and no tenderness.   Lymphadenopathy:        Head (right side): No submental and no submandibular adenopathy present.        Head (left side): No submental and no submandibular adenopathy present.   Neurological: She is alert and oriented to person, place, and time.   Skin: Skin is warm and dry. No rash noted. She is not diaphoretic. No erythema.   Psychiatric: She has a normal mood and affect. Her behavior is normal.   Nursing note and vitals reviewed.        Labs/imaging; reviewed  Assessment:       1. Dysphagia, unspecified type    2. Hiatal hernia    3. Gastroesophageal reflux disease, esophagitis presence not specified        Plan:   1. Continue PPI  2. Esophageal manometry

## 2019-10-30 NOTE — LETTER
October 30, 2019      Dorota Prasad, DO  [address] [facility] [place] [address]           [facility] Gastroenterology  [address], [address]  [facility] [address]  Phone: [phone]          Patient: Luz Muñiz   MR Number: [id number]   YOB: 1985   Date of Visit: 10/30/2019       Dear Dr. Dorota Prasad:    Thank you for referring Luz Muñiz to me for evaluation. Attached you will find relevant portions of my assessment and plan of care.    If you have questions, please do not hesitate to call me. I look forward to following Luz Muñiz along with you.    Sincerely,    Jeffery Spring MD    Enclosure  CC:  No Recipients    If you would like to receive this communication electronically, please contact [email or web address] or [phone] to request more information on [employer] Link access.    For providers and/or their staff who would like to refer a patient to [facility], please contact us through our one-stop-shop provider referral line, [facility] , at [phone].    If you feel you have received this communication in error or would no longer like to receive these types of communications, please e-mail [email or web address]

## 2019-11-07 ENCOUNTER — PATIENT OUTREACH (OUTPATIENT)
Dept: ADMINISTRATIVE | Facility: OTHER | Age: 34
End: 2019-11-07

## 2019-11-08 ENCOUNTER — HOSPITAL ENCOUNTER (OUTPATIENT)
Dept: RADIOLOGY | Facility: HOSPITAL | Age: 34
Discharge: HOME | End: 2019-11-08
Attending: ORTHOPAEDIC SURGERY
Payer: COMMERCIAL

## 2019-11-08 ENCOUNTER — OFFICE VISIT (OUTPATIENT)
Dept: ORTHOPEDICS | Facility: CLINIC | Age: 34
End: 2019-11-08
Payer: COMMERCIAL

## 2019-11-08 VITALS — WEIGHT: 141.56 LBS | BODY MASS INDEX: 27.79 KG/M2 | HEIGHT: 60 IN

## 2019-11-08 DIAGNOSIS — M54.50 CHRONIC LOW BACK PAIN WITHOUT SCIATICA, UNSPECIFIED BACK PAIN LATERALITY: Primary | ICD-10-CM

## 2019-11-08 DIAGNOSIS — G89.29 CHRONIC LOW BACK PAIN WITHOUT SCIATICA, UNSPECIFIED BACK PAIN LATERALITY: Primary | ICD-10-CM

## 2019-11-08 DIAGNOSIS — M54.16 LUMBAR RADICULOPATHY: ICD-10-CM

## 2019-11-08 PROCEDURE — 99999 PR PBB SHADOW E&M-EST. PATIENT-LVL III: ICD-10-PCS | Mod: PBBFAC,,, | Performed by: ORTHOPAEDIC SURGERY

## 2019-11-08 PROCEDURE — 99999 PR PBB SHADOW E&M-EST. PATIENT-LVL III: CPT | Mod: PBBFAC,,, | Performed by: ORTHOPAEDIC SURGERY

## 2019-11-08 PROCEDURE — 72148 MRI LUMBAR SPINE WITHOUT CONTRAST: ICD-10-PCS | Mod: 26,,, | Performed by: RADIOLOGY

## 2019-11-08 PROCEDURE — 72148 MRI LUMBAR SPINE W/O DYE: CPT | Mod: TC

## 2019-11-08 PROCEDURE — 99213 PR OFFICE/OUTPT VISIT, EST, LEVL III, 20-29 MIN: ICD-10-PCS | Mod: S$GLB,,, | Performed by: ORTHOPAEDIC SURGERY

## 2019-11-08 PROCEDURE — 3008F PR BODY MASS INDEX (BMI) DOCUMENTED: ICD-10-PCS | Mod: CPTII,S$GLB,, | Performed by: ORTHOPAEDIC SURGERY

## 2019-11-08 PROCEDURE — 72148 MRI LUMBAR SPINE W/O DYE: CPT | Mod: 26,,, | Performed by: RADIOLOGY

## 2019-11-08 PROCEDURE — 3008F BODY MASS INDEX DOCD: CPT | Mod: CPTII,S$GLB,, | Performed by: ORTHOPAEDIC SURGERY

## 2019-11-08 PROCEDURE — 99213 OFFICE O/P EST LOW 20 MIN: CPT | Mod: S$GLB,,, | Performed by: ORTHOPAEDIC SURGERY

## 2019-11-08 RX ORDER — METHOCARBAMOL 750 MG/1
750 TABLET, FILM COATED ORAL 3 TIMES DAILY
Qty: 60 TABLET | Refills: 0 | Status: SHIPPED | OUTPATIENT
Start: 2019-11-08 | End: 2019-11-28

## 2019-11-11 NOTE — PROGRESS NOTES
The patient returns for follow-up.  She has a history of low back pain on the left side this now made a graded towards the right.    In the past, she has used chiropractic care as well as anti-inflammatories.    Unfortunately patient cannot take anti-inflammatories regularly because of gastrointestinal issues, and has been to the chiropractor recently.    Today reviewed the MRI of her lumbar spine, this demonstrates no evidence of any significant disc desiccation, instability, or other abnormalities.    Impression:  Low back pain    Plan:  I recommended Voltaren gel as well as Robaxin.  Patient is interested in Portia-Danlos syndrome, and wide like a referral to Genetics, she reports she has a history of hypermobility.  I think it is also reasonable that she continue with chiropractic care.    I spent 15 minutes with the patient of which greater than 1/2 the time was devoted to counciling the patient regarding treatment options.

## 2019-11-14 ENCOUNTER — PATIENT MESSAGE (OUTPATIENT)
Dept: GENETICS | Facility: CLINIC | Age: 34
End: 2019-11-14

## 2019-12-06 ENCOUNTER — PATIENT MESSAGE (OUTPATIENT)
Dept: OBSTETRICS AND GYNECOLOGY | Facility: CLINIC | Age: 34
End: 2019-12-06

## 2019-12-09 DIAGNOSIS — N76.0 VULVOVAGINITIS: Primary | ICD-10-CM

## 2019-12-09 RX ORDER — FLUCONAZOLE 150 MG/1
150 TABLET ORAL ONCE
Qty: 2 TABLET | Refills: 0 | Status: SHIPPED | OUTPATIENT
Start: 2019-12-09 | End: 2019-12-09

## 2019-12-19 ENCOUNTER — PATIENT MESSAGE (OUTPATIENT)
Dept: ENDOSCOPY | Facility: HOSPITAL | Age: 34
End: 2019-12-19

## 2019-12-23 ENCOUNTER — HOSPITAL ENCOUNTER (OUTPATIENT)
Facility: HOSPITAL | Age: 34
Discharge: HOME | End: 2019-12-23
Attending: INTERNAL MEDICINE | Admitting: INTERNAL MEDICINE
Payer: COMMERCIAL

## 2019-12-23 ENCOUNTER — HOSPITAL ENCOUNTER (EMERGENCY)
Facility: HOSPITAL | Age: 34
Discharge: HOME | End: 2019-12-23
Attending: EMERGENCY MEDICINE
Payer: COMMERCIAL

## 2019-12-23 ENCOUNTER — NURSE TRIAGE (OUTPATIENT)
Dept: ADMINISTRATIVE | Facility: CLINIC | Age: 34
End: 2019-12-23

## 2019-12-23 VITALS
HEIGHT: 60 IN | TEMPERATURE: 99 F | BODY MASS INDEX: 27.88 KG/M2 | DIASTOLIC BLOOD PRESSURE: 72 MMHG | OXYGEN SATURATION: 100 % | WEIGHT: 142 LBS | SYSTOLIC BLOOD PRESSURE: 118 MMHG | HEART RATE: 80 BPM | RESPIRATION RATE: 18 BRPM

## 2019-12-23 VITALS
HEART RATE: 93 BPM | BODY MASS INDEX: 27.88 KG/M2 | DIASTOLIC BLOOD PRESSURE: 71 MMHG | RESPIRATION RATE: 16 BRPM | SYSTOLIC BLOOD PRESSURE: 129 MMHG | TEMPERATURE: 99 F | WEIGHT: 142 LBS | HEIGHT: 60 IN | OXYGEN SATURATION: 98 %

## 2019-12-23 DIAGNOSIS — R13.10 DYSPHAGIA: ICD-10-CM

## 2019-12-23 DIAGNOSIS — R09.A2 GLOBUS SENSATION: Primary | ICD-10-CM

## 2019-12-23 PROCEDURE — 99283 EMERGENCY DEPT VISIT LOW MDM: CPT | Mod: 25

## 2019-12-23 PROCEDURE — 91037 ESOPH IMPED FUNCTION TEST: CPT | Performed by: INTERNAL MEDICINE

## 2019-12-23 PROCEDURE — 99284 PR EMERGENCY DEPT VISIT,LEVEL IV: ICD-10-PCS | Mod: ,,, | Performed by: EMERGENCY MEDICINE

## 2019-12-23 PROCEDURE — 99284 EMERGENCY DEPT VISIT MOD MDM: CPT | Mod: ,,, | Performed by: EMERGENCY MEDICINE

## 2019-12-23 PROCEDURE — 25000003 PHARM REV CODE 250: Performed by: INTERNAL MEDICINE

## 2019-12-23 PROCEDURE — 91010 ESOPHAGUS MOTILITY STUDY: CPT | Performed by: INTERNAL MEDICINE

## 2019-12-23 PROCEDURE — 25000003 PHARM REV CODE 250: Performed by: EMERGENCY MEDICINE

## 2019-12-23 RX ORDER — LIDOCAINE HYDROCHLORIDE 20 MG/ML
5 SOLUTION OROPHARYNGEAL
Status: COMPLETED | OUTPATIENT
Start: 2019-12-23 | End: 2019-12-23

## 2019-12-23 RX ORDER — SODIUM CHLORIDE 9 MG/ML
INJECTION, SOLUTION INTRAVENOUS CONTINUOUS
Status: DISCONTINUED | OUTPATIENT
Start: 2019-12-23 | End: 2019-12-23 | Stop reason: HOSPADM

## 2019-12-23 RX ORDER — LIDOCAINE HYDROCHLORIDE 20 MG/ML
JELLY TOPICAL ONCE
Status: COMPLETED | OUTPATIENT
Start: 2019-12-23 | End: 2019-12-23

## 2019-12-23 RX ADMIN — LIDOCAINE HYDROCHLORIDE 5 ML: 20 SOLUTION ORAL; TOPICAL at 07:12

## 2019-12-23 RX ADMIN — LIDOCAINE HYDROCHLORIDE 10 ML: 20 JELLY TOPICAL at 08:12

## 2019-12-23 NOTE — ED PROVIDER NOTES
Source of History:  Patient    Chief complaint:  Dysphagia (esophageal monometry feels like there is a lump in her throat)    HPI:  Luz Muñiz is a 34 y.o. female with history of dysphagia, currently being worked up to by GI, presenting to ER with complaint of persistent globus sensation.  The patient had esophageal manometry earlier today.  After the procedure, she had persistent globus sensation.  She has some pain with swallowing, however she is able to tolerate secretions, no difficulty breathing, no vomiting, no drooling, and is able to tolerate p.o. at home.  The patient called the nursing hotline for the GI clinic and was sent to the emergency department for further evaluation.  She has no chest pain, no neck pain.  She has not taken any medication for the discomfort.  She has no other complaints at this time.    ROS: As per HPI and below:  Review of Systems   Constitutional: Negative for fever.   HENT: Positive for sore throat.    Respiratory: Negative for cough and shortness of breath.    Cardiovascular: Negative for chest pain.   Gastrointestinal: Negative for abdominal pain and vomiting.   Skin: Negative for rash.     Review of patient's allergies indicates:   Allergen Reactions    Codeine Rash    Vicodin [hydrocodone-acetaminophen] Hives     Current Facility-Administered Medications on File Prior to Encounter   Medication Dose Route Frequency Provider Last Rate Last Dose    [COMPLETED] lidocaine HCl 2% urojet   Nasal Once Bradly Germain MD   10 mL at 12/23/19 0824    [DISCONTINUED] 0.9%  NaCl infusion   Intravenous Continuous Bradly Germain MD         Current Outpatient Medications on File Prior to Encounter   Medication Sig Dispense Refill    azelastine (ASTELIN) 137 mcg (0.1 %) nasal spray 1 spray by Nasal route 2 (two) times daily.  5    b complex vitamins tablet Take 1 tablet by mouth once daily.      CALCIUM ORAL Take by mouth.      CETIRIZINE HCL (ZYRTEC ORAL) Take by mouth.       desog-e.estradiol/e.estradiol (KARIVA) 0.15-0.02 mgx21 /0.01 mg x 5 per tablet Take 1 tablet by mouth once daily. 28 tablet 11    escitalopram oxalate (LEXAPRO) 10 MG tablet Take 10 mg by mouth nightly.  3    escitalopram oxalate (LEXAPRO) 5 MG Tab Take 5 mg by mouth every evening. Totaling 8 mg with liquid form  2    esomeprazole (NEXIUM) 40 MG capsule Take 1 capsule (40 mg total) by mouth before breakfast. 30 capsule 11    etodolac (LODINE) 400 MG tablet Take 1 tablet (400 mg total) by mouth every 6 (six) hours as needed (pain). 60 tablet 5    lamoTRIgine (LAMICTAL) 25 MG tablet Take 50 mg by mouth 2 (two) times daily.  3    niacin 100 MG Tab Take 100 mg by mouth every evening.      ondansetron (ZOFRAN) 4 MG tablet Take 1 tablet (4 mg total) by mouth every 8 (eight) hours as needed for Nausea. 30 tablet 0    traMADol (ULTRAM) 50 mg tablet TAKE 1 TABLET BY MOUTH EVERY 4 HOURS AS NEEDED PAIN  0       PMH:  As per HPI and below:  Past Medical History:   Diagnosis Date    Arthritis     GERD (gastroesophageal reflux disease)     Hyperlipidemia     Seasonal allergies 6/10/2015     Past Surgical History:   Procedure Laterality Date    TONSILLECTOMY  2014    WRIST SURGERY      Ligament repair of Right wrist x 2       Social History     Socioeconomic History    Marital status: Single     Spouse name: Not on file    Number of children: Not on file    Years of education: Not on file    Highest education level: Not on file   Occupational History    Occupation:    Social Needs    Financial resource strain: Somewhat hard    Food insecurity:     Worry: Never true     Inability: Never true    Transportation needs:     Medical: No     Non-medical: No   Tobacco Use    Smoking status: Never Smoker    Smokeless tobacco: Never Used   Substance and Sexual Activity    Alcohol use: Yes     Alcohol/week: 1.0 standard drinks     Types: 1 Glasses of wine per week     Frequency: 2-4 times a month      Drinks per session: 1 or 2     Binge frequency: Never     Comment: socially    Drug use: No    Sexual activity: Not Currently     Partners: Male     Birth control/protection: OCP   Lifestyle    Physical activity:     Days per week: 3 days     Minutes per session: 70 min    Stress: To some extent   Relationships    Social connections:     Talks on phone: Not on file     Gets together: Twice a week     Attends Ten[employer] service: Not on file     Active member of club or organization: No     Attends meetings of clubs or organizations: Never     Relationship status: Never    Other Topics Concern    Are you pregnant or think you may be? No    Breast-feeding No   Social History Narrative    Not on file       Family History   Problem Relation Age of Onset    Hyperlipidemia Father     Heart disease Father         valve replacement    Hyperlipidemia Mother     Cancer Maternal Grandmother         skin    No Known Problems Brother     Acne Neg Hx     Colon cancer Neg Hx     Ovarian cancer Neg Hx     Breast cancer Neg Hx     Diabetes Neg Hx     Hypertension Neg Hx     Eclampsia Neg Hx     Miscarriages / Stillbirths Neg Hx      labor Neg Hx     Stroke Neg Hx     Colon polyps Neg Hx     Liver cancer Neg Hx     Liver disease Neg Hx     Cirrhosis Neg Hx     Rectal cancer Neg Hx     Stomach cancer Neg Hx     Esophageal cancer Neg Hx     Celiac disease Neg Hx     Inflammatory bowel disease Neg Hx     Crohn's disease Neg Hx        Physical Exam:    Vitals:    19 1903   BP: 118/72   Pulse: 80   Resp:    Temp:      Gen: No acute distress. Nontoxic.  Mental Status:  Alert and oriented x 3.  Appropriate, conversant.  Skin: Warm, dry. No rashes seen.   Eyes: No conjunctival injection.  ENT:  No erythema or cobblestoning of the posterior oropharynx.  No petechiae.  No tonsillar hypertrophy or exudate.  No trismus.   Pulm: Clear to auscultation bilaterally.  Good air movement.No increased  work of breathing.  CV: Normal peripheral perfusion.  MSK: No neck swelling. Neck supple. No meningismus.   Neuro: Awake. Speech normal. No muffling. No focal neuro deficit observed.    Laboratory Studies:  Labs Reviewed - No data to display    Chart reviewed.  Esophageal manometry earlier today.    Medications Given:  Medications   lidocaine HCl 2% oral solution 5 mL (5 mLs Oral Given 12/23/19 1900)       Discussed with: GI    MDM:    34 y.o. female with persistent globus sensation after esophageal manometry earlier today.  She is afebrile, stable, nontoxic.  Airway is patent. I discussed this case with GI, I suspect irritation after the manometry.  She is able to tolerate p.o., not vomiting, and she is tolerating her secretions.  Therefore we will give her viscous lidocaine for her discomfort here.  Will discharge home with Magic mouthwash, return precautions were discussed at bedside.    Diagnostic Impression:    1. Globus sensation      Patient and/or family understands the plan and is in agreement, verbalized understanding, questions answered    Edith Knapp MD  Emergency Medicine           Edith Knapp MD  12/23/19 1908

## 2019-12-23 NOTE — ED NOTES
LOC: The patient is awake, alert and aware of environment with an appropriate affect, the patient is oriented x 3 and speaking appropriately.  APPEARANCE: Patient resting comfortably and in no acute distress, patient is clean and well groomed, patient's clothing is properly fastened.  SKIN: The skin is warm and dry, color consistent with ethnicity, patient has normal skin turgor and moist mucus membranes, skin intact, no breakdown or bruising noted.  MUSCULOSKELETAL: Patient moving all extremities spontaneously, no obvious swelling or deformities noted.  RESPIRATORY: Airway is open and patent, respirations are spontaneous, patient has a normal effort and rate, no accessory muscle use noted  ABDOMEN: Soft and non tender to palpation, no distention noted  NEUROLOGIC:  facial expression is symmetrical, patient moving all extremities spontaneously, normal sensation in all extremities when touched with a finger.  Follows all commands appropriately.     Patient had a esophageal monometry this am, patient states since she got home she states she felt like there is a ball stuck in the back of her throat, denies any bleeding, patient states she is able to swallow and eat without difficulty, no acute distress noted, will continue to monitor

## 2019-12-23 NOTE — DISCHARGE INSTRUCTIONS
Esophageal Manometry     A catheter measures pressure along the esophagus.     Esophageal manometry is a test to measure the strength and function of the esophagus (the food pipe). Results can help identify causes of heartburn, swallowing problems, or chest pain. The test can also help plan surgery and determine the success of previous surgery.  Preparing for the test  Be sure to talk to your healthcare provider about any medicines you take. Some medicines can affect the test results. Also ask any questions you have about the risks of the test. These include irritation to the nose and throat. Be sure not to smoke, eat, or drink for up to 12 hours before the test.  During the test  Manometry takes about an hour. Usually you lie down during the test. Your nose and throat are numbed. Then a soft, thin tube is placed through the nose and down the esophagus. At first you may notice a gagging feeling. You will be asked to swallow several times. Holes along the tube measure the pressure while you swallow. Measurements are printed out as tracings, much like a heart test tracing. After the test, another catheter may be left in the esophagus for up to 24 hours to measure acid (pH) levels.  After esophageal manometry  Youll probably discuss the results of the test with your healthcare provider at another appointment. This is because time is needed to review the tracings. You may have a mild sore throat for a short time. As soon as the numbness in your throat is gone, you can return to eating and your normal activities.  Date Last Reviewed: 6/1/2016  © 5602-6531 [employer]. [address] [place] [address], [place] [address]. All rights reserved. This information is not intended as a substitute for professional medical care. Always follow your healthcare professional's instructions.

## 2019-12-23 NOTE — TELEPHONE ENCOUNTER
Manometry, done this am, and she is having a sore throat, which she was told to expect, but feels like she's having difficulty swallowing due to pain, feels like something is stuck back there.  This procedure was done by a nurse, and she is in another procedure now, the attending physician, Bradly Germain was not present during the procedure, and is not available to speak with, either, I advised the pt go in to the ED for evaluation, if having difficulty swallowing and talking.  Pt declines the ED, just wants someone to call her back to discuss. I will reach out to Dr. Spring, the GI physician who referred the pt for the manometry to discuss symptoms and advise further.    Reason for Disposition   Sounds like a serious complication to the triager    Additional Information   Negative: Sounds like a life-threatening emergency to the triager   Negative: Chest pain   Negative: Difficulty breathing   Negative: Surgical incision symptoms and questions   Negative: [1] Discomfort (pain, burning or stinging) when passing urine AND [2] male   Negative: [1] Discomfort (pain, burning or stinging) when passing urine AND [2] female   Negative: Constipation   Negative: New or worsening leg (calf, thigh) pain   Negative: New or worsening leg swelling   Negative: Dizziness is severe, or persists > 24 hours after surgery   Negative: Pain, redness, swelling, or pus at IV Site   Negative: Symptoms arising from use of a urinary catheter (Jimenez or Coude)   Negative: Cast problems or questions   Negative: Medication question   Negative: [1] Widespread rash AND [2] bright red, sunburn-like   Negative: [1] Severe headache AND [2] after spinal (epidural) anesthesia   Negative: [1] Vomiting AND [2] persists > 4 hours   Negative: [1] Vomiting AND [2] abdomen looks much more swollen than usual   Negative: [1] Drinking very little AND [2] dehydration suspected (e.g., no urine > 12 hours, very dry mouth, very lightheaded)    Negative: Patient sounds very sick or weak to the triager    Protocols used: POST-OP SYMPTOMS AND [id number]

## 2019-12-24 NOTE — DISCHARGE INSTRUCTIONS
Follow-Up Plan:  - Follow-up with primary care doctor within 3 - 5 days  - Additional testing and/or evaluation as directed by your primary doctor    Return to the Emergency Department for symptoms including but not limited to: worsening symptoms, shortness of breath or chest pain, vomiting with inability to hold down fluids, fevers greater than 100.4°F, passing out/fainting/unconsciousness, or other concerning symptoms.

## 2019-12-27 PROCEDURE — 91037 ESOPH IMPED FUNCTION TEST: CPT | Mod: 26,,, | Performed by: INTERNAL MEDICINE

## 2019-12-27 PROCEDURE — 91010 PR ESOPHAGEAL MOTILITY STUDY, MA2METRY: ICD-10-PCS | Mod: 26,,, | Performed by: INTERNAL MEDICINE

## 2019-12-27 PROCEDURE — 91010 ESOPHAGUS MOTILITY STUDY: CPT | Mod: 26,,, | Performed by: INTERNAL MEDICINE

## 2019-12-27 PROCEDURE — 91037 PR GERD TST W/ NASAL IMPEDENCE ELECTROD: ICD-10-PCS | Mod: 26,,, | Performed by: INTERNAL MEDICINE

## 2019-12-27 NOTE — [employer]ATION PATIENT INSTRUCTIONS
Discharge Summary/Instructions after an Endoscopic Procedure  Patient Name: Luz Muñiz  Patient MRN: [id number]  Patient YOB: 1985  Friday, December 27, 2019  Bradly Germain MD  RESTRICTIONS:  During your procedure today, you received medications for sedation.  These   medications may affect your judgment, balance and coordination.  Therefore,   for 24 hours, you have the following restrictions:   - DO NOT drive a car, operate machinery, make legal/financial decisions,   sign important papers or drink alcohol.    ACTIVITY:  Today: no heavy lifting, straining or running due to procedural   sedation/anesthesia.  The following day: return to full activity including work.  DIET:  Eat and drink normally unless instructed otherwise.     TREATMENT FOR COMMON SIDE EFFECTS:  - Mild abdominal pain, nausea, belching, bloating or excessive gas:  rest,   eat lightly and use a heating pad.  - Sore Throat: treat with throat lozenges and/or gargle with warm salt   water.  - Because air was used during the procedure, expelling large amounts of air   from your rectum or belching is normal.  - If a bowel prep was taken, you may not have a bowel movement for 1-3 days.    This is normal.  SYMPTOMS TO WATCH FOR AND REPORT TO YOUR PHYSICIAN:  1. Abdominal pain or bloating, other than gas cramps.  2. Chest pain.  3. Back pain.  4. Signs of infection such as: chills or fever occurring within 24 hours   after the procedure.  5. Rectal bleeding, which would show as bright red, maroon, or black stools.   (A tablespoon of blood from the rectum is not serious, especially if   hemorrhoids are present.)  6. Vomiting.  7. Weakness or dizziness.  GO DIRECTLY TO THE NEAREST EMERGENCY ROOM IF YOU HAVE ANY OF THE FOLLOWING:      Difficulty breathing              Chills and/or fever over 101 F   Persistent vomiting and/or vomiting blood   Severe abdominal pain   Severe chest pain   Black, tarry stools   Bleeding- more than one  tablespoon   Any other symptom or condition that you feel may need urgent attention  Your doctor recommends these additional instructions:  If any biopsies were taken, your doctors clinic will contact you in 1 to 2   weeks with any results.  - Discharge patient to home.   - Return to referring physician at appointment to be scheduled.  For questions, problems or results please call your physician - Bradly Germain MD at Work:  [phone].  [facility], EMERGENCY ROOM PHONE NUMBER: [phone]  IF A COMPLICATION OR EMERGENCY SITUATION ARISES AND YOU ARE UNABLE TO REACH   YOUR PHYSICIAN - GO DIRECTLY TO THE EMERGENCY ROOM.  Bradly Germain MD  12/27/2019 11:08:20 AM  This report has been verified and signed electronically.  [employer]

## 2020-01-03 ENCOUNTER — PATIENT MESSAGE (OUTPATIENT)
Dept: GASTROENTEROLOGY | Facility: CLINIC | Age: 35
End: 2020-01-03

## 2020-01-03 ENCOUNTER — TELEPHONE (OUTPATIENT)
Dept: GASTROENTEROLOGY | Facility: CLINIC | Age: 35
End: 2020-01-03

## 2020-01-03 NOTE — TELEPHONE ENCOUNTER
----- Message from Jeffery Spring MD sent at 1/3/2020  9:05 AM CST -----  Overall the manometry looked good, no evidence of etiology for chest pain; some reflux was noted. No nutcracker esophagus, achalasia. If she still has chest pain, I would consider a trial of amitriptyline though if she would like to try this I would talk to her PCP first as we may need to change her lexapro

## 2020-01-08 ENCOUNTER — PATIENT MESSAGE (OUTPATIENT)
Dept: GASTROENTEROLOGY | Facility: CLINIC | Age: 35
End: 2020-01-08

## 2020-01-24 ENCOUNTER — TELEPHONE (OUTPATIENT)
Dept: INTERNAL MEDICINE | Facility: CLINIC | Age: 35
End: 2020-01-24

## 2020-01-24 RX ORDER — ESOMEPRAZOLE MAGNESIUM 40 MG/1
40 CAPSULE, DELAYED RELEASE ORAL
Qty: 30 CAPSULE | Refills: 11 | Status: SHIPPED | OUTPATIENT
Start: 2020-01-24 | End: 2021-01-23

## 2020-01-28 ENCOUNTER — TELEPHONE (OUTPATIENT)
Dept: GASTROENTEROLOGY | Facility: CLINIC | Age: 35
End: 2020-01-28

## 2020-01-28 ENCOUNTER — PATIENT MESSAGE (OUTPATIENT)
Dept: GASTROENTEROLOGY | Facility: CLINIC | Age: 35
End: 2020-01-28

## 2020-01-28 ENCOUNTER — PATIENT MESSAGE (OUTPATIENT)
Dept: INTERNAL MEDICINE | Facility: CLINIC | Age: 35
End: 2020-01-28

## 2020-01-28 NOTE — TELEPHONE ENCOUNTER
Spoke to patient regarding the patient portal she send on today. Patient stated she is very upset at this time due to she spoke to  and he stated he will give her a call once he spoke to her other doctor. That was on January 8th it is now January 28 and Saw had not reach out to her at all regarding the matter. I apologize to this patient profusely and infrom patient that I will inform  of this matter she stated not to because she will not be back to see him again and will find another gastro doctor.

## 2020-02-04 ENCOUNTER — PATIENT MESSAGE (OUTPATIENT)
Dept: GENETICS | Facility: CLINIC | Age: 35
End: 2020-02-04

## 2020-03-23 ENCOUNTER — TELEPHONE (OUTPATIENT)
Dept: GASTROENTEROLOGY | Facility: CLINIC | Age: 35
End: 2020-03-23

## 2020-03-23 NOTE — TELEPHONE ENCOUNTER
Spoke with patient.  She requested video appt in 2 weeks.  Scheduled and accepted for 4/6 at 1 pm with Dr. Webb.

## 2020-04-06 ENCOUNTER — OFFICE VISIT (OUTPATIENT)
Dept: GASTROENTEROLOGY | Facility: CLINIC | Age: 35
End: 2020-04-06
Payer: COMMERCIAL

## 2020-04-06 DIAGNOSIS — R13.10 DYSPHAGIA, UNSPECIFIED TYPE: Primary | ICD-10-CM

## 2020-04-06 DIAGNOSIS — R07.9 CHEST PAIN, UNSPECIFIED TYPE: ICD-10-CM

## 2020-04-06 DIAGNOSIS — K50.00 TERMINAL ILEITIS WITHOUT COMPLICATION: ICD-10-CM

## 2020-04-06 PROCEDURE — 99214 OFFICE O/P EST MOD 30 MIN: CPT | Mod: 95,,, | Performed by: INTERNAL MEDICINE

## 2020-04-06 PROCEDURE — 99214 PR OFFICE/OUTPT VISIT, EST, LEVL IV, 30-39 MIN: ICD-10-PCS | Mod: 95,,, | Performed by: INTERNAL MEDICINE

## 2020-04-06 RX ORDER — HYOSCYAMINE SULFATE 0.12 MG/1
0.12 TABLET SUBLINGUAL EVERY 12 HOURS PRN
Qty: 60 TABLET | Refills: 0 | Status: SHIPPED | OUTPATIENT
Start: 2020-04-06 | End: 2020-04-30

## 2020-04-06 NOTE — PROGRESS NOTES
The patient location is:  home  The chief complaint leading to consultation is:  Episodic chest pain and dysphagia longstanding GERD.  Visit type: Virtual visit with synchronous audio and video  Total time spent with patient:  30 min  Each patient to whom he or she provides medical services by telemedicine is:  (1) informed of the relationship between the physician and patient and the respective role of any other health care provider with respect to management of the patient; and (2) notified that he or she may decline to receive medical services by telemedicine and may withdraw from such care at any time.    Notes:  See above        [facility] Consultation Note    Reason for Consult:  The primary encounter diagnosis was Dysphagia, unspecified type. Diagnoses of Chest pain, unspecified type and Terminal ileitis without complication were also pertinent to this visit.    PCP:   Angie Jansen       Referring MD:  No referring provider defined for this encounter.    Initial History of Present Illness (HPI):  This is a 34 y.o. female video visit her was in her [facility].  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  No change in her PPI use no increased risk factors for GERD.  No history of eating disorders in the past.  She has takes her PPI once daily she knows best way to take his 45 min for 1st protein meal of the day breakfast.  She has not had an episode since she did go to the ER at that point.  Chest x-ray looks good her symptoms are nonexertional.  Occasionally she fill like a pill gets stuck proximal esophagus.  Never had a food impaction EGD and colonoscopy in the past showed no  cause although she had nonspecific ileitis and recommend a follow-up colonoscopy she never scheduled that.  She is now interested in doing that.  No back pain she is being worked up she says for possible Portia-Danlos syndrome with her rheumatologist an orthopedist.  Her dad with heart disease and valvular replacement surgery she will follow-up with cardiology for evaluation to see with her family history in her vague symptoms of shin echocardiogram of her heart would be warranted.    Abdominal pain - no  Reflux - yes but well controlled on her PPI once daily  Dysphagia - intermittent dysphagia mainly for pills sometimes solid food   Bowel habits - normal  GI bleeding - none  NSAID usage - none    Interval HPI 04/06/2020:  The patient's last visit with me was on 3/16/2017.      ROS:  Constitutional: No fevers, chills, No weight loss  ENT:  No heartburn occasion dysphagia no odynophagia no hoarseness  CV:  As above chest pain usually after eating or stress nonexertional last episode October 2019, no palpitation  Pulm: No cough, No shortness of breath, no wheezing  Ophtho: No vision changes  GI: see HPI  Derm: No rash, no itching  Heme: No lymphadenopathy, sometime easy bruising  MSK: No significant arthritis  : No dysuria, No hematuria  Endo: No hot or cold intolerance  Neuro: No syncope, No seizure, no strokes  Psych: No uncontrolled anxiety, No uncontrolled depression    Medical History:  has a past medical history of Arthritis, GERD (gastroesophageal reflux disease), Hyperlipidemia, and Seasonal allergies (6/10/2015).  No history of eating disorders no anorexia no bulimia in the past.    Surgical History:  has a past surgical history that includes Wrist surgery; Tonsillectomy (2014); and Esophageal manometry with measurement of impedance (N/A, 12/23/2019).    Family History: family history includes Cancer in her maternal grandmother; Heart disease in her father; Hyperlipidemia in her father and mother; No Known  Problems in her brother..     Social History:  reports that she has never smoked. She has never used smokeless tobacco. She reports that she drinks about 1.0 standard drinks of alcohol per week. She reports that she does not use drugs.    Review of patient's allergies indicates:   Allergen Reactions    Codeine Rash    Vicodin [hydrocodone-acetaminophen] Hives       Medication List with Changes/Refills   New Medications    HYOSCYAMINE (LEVSIN/SL) 0.125 MG SUBL    Place 1 tablet (0.125 mg total) under the tongue every 12 (twelve) hours as needed (esophageal spasms).   Current Medications    (MAGIC MOUTHWASH) 1:1:1 BENADRYL 12.5MG/5ML LIQ, ALUMINUM & MAGNESIUM HYDROXIDE-SIMEHTICONE (MAALOX), LIDOCAINE VISCOUS 2%    Swish and spit 10 mLs every 4 (four) hours as needed. for mouth sores    AZELASTINE (ASTELIN) 137 MCG (0.1 %) NASAL SPRAY    1 spray by Nasal route 2 (two) times daily.    B COMPLEX VITAMINS TABLET    Take 1 tablet by mouth once daily.    CALCIUM ORAL    Take by mouth.    CETIRIZINE HCL (ZYRTEC ORAL)    Take by mouth.    DESOG-E.ESTRADIOL/E.ESTRADIOL (KARIVA) 0.15-0.02 MGX21 /0.01 MG X 5 PER TABLET    Take 1 tablet by mouth once daily.    ESCITALOPRAM OXALATE (LEXAPRO) 10 MG TABLET    Take 10 mg by mouth nightly.    ESOMEPRAZOLE (NEXIUM) 40 MG CAPSULE    Take 1 capsule (40 mg total) by mouth before breakfast.    ETODOLAC (LODINE) 400 MG TABLET    Take 1 tablet (400 mg total) by mouth every 6 (six) hours as needed (pain).    FLUZONE QUAD [id number], PF, 60 MCG (15 MCG X 4)/0.5 ML SYRG    TO BE ADMINISTERED BY PHARMACIST FOR IMMUNIZATION    LAMOTRIGINE (LAMICTAL) 25 MG TABLET    Take 50 mg by mouth 2 (two) times daily.    NIACIN 100 MG TAB    Take 100 mg by mouth every evening.    ONDANSETRON (ZOFRAN) 4 MG TABLET    Take 1 tablet (4 mg total) by mouth every 8 (eight) hours as needed for Nausea.    TRAMADOL (ULTRAM) 50 MG TABLET    TAKE 1 TABLET BY MOUTH EVERY 4 HOURS AS NEEDED PAIN         Objective  Findings:    Vital Signs:  There were no vitals taken for this visit.  There is no height or weight on file to calculate BMI.    Physical Exam:  Video visit per it was in a [facility] COVID-19 pandemic mandate.  General Appearance: Well appearing in no acute distress  Neurologic:  Alert and oriented x4  Psychiatric:  Normal speech mentation and affect    Labs:  Lab Results   Component Value Date    WBC 7.24 10/09/2019    HGB 12.1 10/09/2019    HCT 37.9 10/09/2019     10/09/2019    CHOL 295 (H) 03/08/2019    TRIG 146 03/08/2019    HDL 60 03/08/2019    ALT 18 10/09/2019    AST 22 10/09/2019     10/09/2019    K 3.8 10/09/2019     10/09/2019    CREATININE 0.7 10/09/2019    BUN 10 10/09/2019    CO2 22 (L) 10/09/2019    TSH 1.604 08/19/2019             Medical Decision Making:    Impression:           - Milan Classification: Normal esophageal                         manometry study.  Recommendation:       - Discharge patient to home.                        - Return to referring physician at appointment to                         be scheduled.  Bradly Germain MD  12/27/2019     Prior EGD and colonoscopy path and images personally reviewed by myself labs reviewed  Dysphagia talk given.  Patient is followed by Rheumatology and Orthopedics being evaluated currently for possible   Portia-Danlos syndrome  Esophagram with barium tablet talk given EGD colonoscopy talk given follow-up a terminal ileitis  And dysphagia.    Assessment:  1. Dysphagia, unspecified type    2. Chest pain, unspecified type    3. Terminal ileitis without complication         Recommendations:  1.  Intermittent solid food dysphagia very periodic with dysphagia for pills with 2 esophagram with barium tablet in EGD for follow-up.  2.  History terminal ileitis nonspecific follow-up colonoscopy next available.  3.  Intermittent chest pain nonexertional with valvular disease in her dad and being evaluated for possible  Portia-Danlos  syndrome will refer to cardiology for further evaluation of her chest discomfort.  Will give patient a trial of Levsin 0.125 mg sublingually q.12 hours p.r.n. dysphagia.  Patient knows after a few tries with this medication of no benefit she should discontinue it.  4.  Return GI clinic in 8-12 weeks video visit or in person.    No follow-ups on file.      Order summary:  Orders Placed This Encounter    FL Esophagram With Barium Tablet    CBC auto differential    Comprehensive metabolic panel    Iron and TIBC    Ferritin    Vitamin D    Vitamin B12    Magnesium    hCG, quantitative    TSH    Ambulatory referral/consult to Cardiology    hyoscyamine (LEVSIN/SL) 0.125 mg Subl    Case request GI: EGD (ESOPHAGOGASTRODUODENOSCOPY), COLONOSCOPY         Thank you so much for allowing me to participate in the care of Luz Webb MD

## 2020-04-06 NOTE — Clinical Note
Plain please order fasting lab works Monday May 4, 2020.Please refer to cardiology for evaluation periodic chest pain.Please schedule esophagram with barium tablet and May.

## 2020-04-30 DIAGNOSIS — R13.10 DYSPHAGIA, UNSPECIFIED TYPE: ICD-10-CM

## 2020-04-30 DIAGNOSIS — R07.9 CHEST PAIN, UNSPECIFIED TYPE: ICD-10-CM

## 2020-04-30 RX ORDER — HYOSCYAMINE SULFATE 0.12 MG/1
TABLET SUBLINGUAL
Qty: 60 TABLET | Refills: 0 | Status: SHIPPED | OUTPATIENT
Start: 2020-04-30

## 2020-05-14 ENCOUNTER — PATIENT MESSAGE (OUTPATIENT)
Dept: GASTROENTEROLOGY | Facility: CLINIC | Age: 35
End: 2020-05-14

## 2020-05-19 ENCOUNTER — PATIENT MESSAGE (OUTPATIENT)
Dept: GASTROENTEROLOGY | Facility: CLINIC | Age: 35
End: 2020-05-19

## 2020-05-25 ENCOUNTER — LAB VISIT (OUTPATIENT)
Dept: LAB | Facility: HOSPITAL | Age: 35
End: 2020-05-25
Attending: INTERNAL MEDICINE
Payer: COMMERCIAL

## 2020-05-25 DIAGNOSIS — R07.9 CHEST PAIN, UNSPECIFIED TYPE: ICD-10-CM

## 2020-05-25 DIAGNOSIS — R13.10 DYSPHAGIA, UNSPECIFIED TYPE: ICD-10-CM

## 2020-05-25 LAB
25(OH)D3+25(OH)D2 SERPL-MCNC: 44 NG/ML (ref 30–96)
ALBUMIN SERPL BCP-MCNC: 3.7 G/DL (ref 3.5–5.2)
ALP SERPL-CCNC: 62 U/L (ref 55–135)
ALT SERPL W/O P-5'-P-CCNC: 17 U/L (ref 10–44)
ANION GAP SERPL CALC-SCNC: 9 MMOL/L (ref 8–16)
AST SERPL-CCNC: 16 U/L (ref 10–40)
BASOPHILS # BLD AUTO: 0.1 K/UL (ref 0–0.2)
BASOPHILS NFR BLD: 1.2 % (ref 0–1.9)
BILIRUB SERPL-MCNC: 0.3 MG/DL (ref 0.1–1)
BUN SERPL-MCNC: 11 MG/DL (ref 6–20)
CALCIUM SERPL-MCNC: 9.3 MG/DL (ref 8.7–10.5)
CHLORIDE SERPL-SCNC: 107 MMOL/L (ref 95–110)
CO2 SERPL-SCNC: 23 MMOL/L (ref 23–29)
CREAT SERPL-MCNC: 0.8 MG/DL (ref 0.5–1.4)
DIFFERENTIAL METHOD: ABNORMAL
EOSINOPHIL # BLD AUTO: 0.5 K/UL (ref 0–0.5)
EOSINOPHIL NFR BLD: 6 % (ref 0–8)
ERYTHROCYTE [DISTWIDTH] IN BLOOD BY AUTOMATED COUNT: 13.2 % (ref 11.5–14.5)
EST. GFR  (AFRICAN AMERICAN): >60 ML/MIN/1.73 M^2
EST. GFR  (NON AFRICAN AMERICAN): >60 ML/MIN/1.73 M^2
FERRITIN SERPL-MCNC: <1 NG/ML (ref 20–300)
GLUCOSE SERPL-MCNC: 92 MG/DL (ref 70–110)
HCG INTACT+B SERPL-ACNC: <1.2 MIU/ML
HCT VFR BLD AUTO: 39.7 % (ref 37–48.5)
HGB BLD-MCNC: 12.2 G/DL (ref 12–16)
IMM GRANULOCYTES # BLD AUTO: 0.01 K/UL (ref 0–0.04)
IMM GRANULOCYTES NFR BLD AUTO: 0.1 % (ref 0–0.5)
IRON SERPL-MCNC: 39 UG/DL (ref 30–160)
LYMPHOCYTES # BLD AUTO: 3.4 K/UL (ref 1–4.8)
LYMPHOCYTES NFR BLD: 42.1 % (ref 18–48)
MAGNESIUM SERPL-MCNC: 2.1 MG/DL (ref 1.6–2.6)
MCH RBC QN AUTO: 27.4 PG (ref 27–31)
MCHC RBC AUTO-ENTMCNC: 30.7 G/DL (ref 32–36)
MCV RBC AUTO: 89 FL (ref 82–98)
MONOCYTES # BLD AUTO: 0.7 K/UL (ref 0.3–1)
MONOCYTES NFR BLD: 8.4 % (ref 4–15)
NEUTROPHILS # BLD AUTO: 3.4 K/UL (ref 1.8–7.7)
NEUTROPHILS NFR BLD: 42.2 % (ref 38–73)
NRBC BLD-RTO: 0 /100 WBC
PLATELET # BLD AUTO: 289 K/UL (ref 150–350)
PMV BLD AUTO: 9.1 FL (ref 9.2–12.9)
POTASSIUM SERPL-SCNC: 4 MMOL/L (ref 3.5–5.1)
PROT SERPL-MCNC: 7.2 G/DL (ref 6–8.4)
RBC # BLD AUTO: 4.46 M/UL (ref 4–5.4)
SATURATED IRON: 6 % (ref 20–50)
SODIUM SERPL-SCNC: 139 MMOL/L (ref 136–145)
TOTAL IRON BINDING CAPACITY: 676 UG/DL (ref 250–450)
TRANSFERRIN SERPL-MCNC: 457 MG/DL (ref 200–375)
TSH SERPL DL<=0.005 MIU/L-ACNC: 2.37 UIU/ML (ref 0.4–4)
VIT B12 SERPL-MCNC: 766 PG/ML (ref 210–950)
WBC # BLD AUTO: 8.05 K/UL (ref 3.9–12.7)

## 2020-05-25 PROCEDURE — 83735 ASSAY OF MAGNESIUM: CPT

## 2020-05-25 PROCEDURE — 82306 VITAMIN D 25 HYDROXY: CPT

## 2020-05-25 PROCEDURE — 83540 ASSAY OF IRON: CPT

## 2020-05-25 PROCEDURE — 36415 COLL VENOUS BLD VENIPUNCTURE: CPT

## 2020-05-25 PROCEDURE — 80053 COMPREHEN METABOLIC PANEL: CPT

## 2020-05-25 PROCEDURE — 84702 CHORIONIC GONADOTROPIN TEST: CPT

## 2020-05-25 PROCEDURE — 84443 ASSAY THYROID STIM HORMONE: CPT

## 2020-05-25 PROCEDURE — 82607 VITAMIN B-12: CPT

## 2020-05-25 PROCEDURE — 85025 COMPLETE CBC W/AUTO DIFF WBC: CPT

## 2020-05-25 PROCEDURE — 82728 ASSAY OF FERRITIN: CPT

## 2020-05-27 ENCOUNTER — PATIENT MESSAGE (OUTPATIENT)
Dept: GASTROENTEROLOGY | Facility: CLINIC | Age: 35
End: 2020-05-27

## 2020-06-06 DIAGNOSIS — E61.1 IRON DEFICIENCY: Primary | ICD-10-CM

## 2020-06-06 RX ORDER — FERROUS SULFATE 325(65) MG
325 TABLET ORAL EVERY 12 HOURS
Qty: 60 TABLET | Refills: 4 | Status: SHIPPED | OUTPATIENT
Start: 2020-06-06 | End: 2020-08-09 | Stop reason: SDUPTHER

## 2020-06-11 ENCOUNTER — TELEPHONE (OUTPATIENT)
Dept: GASTROENTEROLOGY | Facility: CLINIC | Age: 35
End: 2020-06-11

## 2020-06-11 NOTE — TELEPHONE ENCOUNTER
----- Message from Cornelio Webb MD sent at 6/6/2020  6:50 PM CDT -----  Ada- please tell patient that they are iron deficient but not anaemic and recommend that they take ferrous sulfate one 325mg pill every 12 hours for next 4 months and repeat fasting hemoglobin and Ferritin in 8 weeks - Orders placed.

## 2020-06-15 ENCOUNTER — OFFICE VISIT (OUTPATIENT)
Dept: GENETICS | Facility: CLINIC | Age: 35
End: 2020-06-15
Payer: COMMERCIAL

## 2020-06-15 VITALS
BODY MASS INDEX: 28.98 KG/M2 | HEIGHT: 60 IN | WEIGHT: 147.63 LBS | HEIGHT: 60 IN | WEIGHT: 147.63 LBS | BODY MASS INDEX: 28.98 KG/M2

## 2020-06-15 DIAGNOSIS — M24.9 HYPERMOBILE JOINTS: Primary | ICD-10-CM

## 2020-06-15 DIAGNOSIS — M25.562 ARTHRALGIA OF BOTH KNEES: ICD-10-CM

## 2020-06-15 DIAGNOSIS — M25.561 ARTHRALGIA OF BOTH KNEES: ICD-10-CM

## 2020-06-15 DIAGNOSIS — M24.9 HYPERMOBILITY OF JOINT: ICD-10-CM

## 2020-06-15 PROCEDURE — 99499 NO LOS: ICD-10-PCS | Mod: S$GLB,,, | Performed by: GENETIC COUNSELOR, MS

## 2020-06-15 PROCEDURE — 99999 PR PBB SHADOW E&M-EST. PATIENT-LVL III: ICD-10-PCS | Mod: PBBFAC,,,

## 2020-06-15 PROCEDURE — 99499 UNLISTED E&M SERVICE: CPT | Mod: S$GLB,,, | Performed by: GENETIC COUNSELOR, MS

## 2020-06-15 PROCEDURE — 99245 OFF/OP CONSLTJ NEW/EST HI 55: CPT | Mod: S$GLB,,, | Performed by: MEDICAL GENETICS

## 2020-06-15 PROCEDURE — 96040 PR GENETIC COUNSELING, EACH 30 MIN: CPT | Mod: S$GLB,,, | Performed by: GENETIC COUNSELOR, MS

## 2020-06-15 PROCEDURE — 99999 PR PBB SHADOW E&M-EST. PATIENT-LVL III: CPT | Mod: PBBFAC,,,

## 2020-06-15 PROCEDURE — 99999 PR PBB SHADOW E&M-EST. PATIENT-LVL IV: ICD-10-PCS | Mod: PBBFAC,,, | Performed by: MEDICAL GENETICS

## 2020-06-15 PROCEDURE — 99245 PR OFFICE CONSULTATION,LEVEL V: ICD-10-PCS | Mod: S$GLB,,, | Performed by: MEDICAL GENETICS

## 2020-06-15 PROCEDURE — 96040 PR GENETIC COUNSELING, EACH 30 MIN: ICD-10-PCS | Mod: S$GLB,,, | Performed by: GENETIC COUNSELOR, MS

## 2020-06-15 PROCEDURE — 99999 PR PBB SHADOW E&M-EST. PATIENT-LVL IV: CPT | Mod: PBBFAC,,, | Performed by: MEDICAL GENETICS

## 2020-06-15 RX ORDER — SYRINGE WITH NEEDLE, 1 ML 28GX1/2"
SYRINGE, EMPTY DISPOSABLE MISCELLANEOUS
COMMUNITY
Start: 2020-05-27

## 2020-06-15 RX ORDER — AMOXICILLIN 500 MG
1 CAPSULE ORAL DAILY
COMMUNITY
End: 2023-01-26

## 2020-06-15 NOTE — PROGRESS NOTES
Luz Muñiz    DOS: 6/15/2020  : 1985  MRN: [id number]     REFERRING MD: Dorota Prasad MD     REASON FOR CONSULT: Our [facility] was asked to evaluate this 34-year-old female with hypermobility. She presents unaccompanied for todays visit.     PRESENT ILLNESS: Ms. Muñiz is a 34-year-old female with hypermobility. 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 She was diagnosed with hypermobility from her rheumatologist but we do not have these records.     Ms. Muñiz has never had a dislocation but reports frequent subluxations in the hips and thumbs. She also has back pain. She has had surgery on her thumb for arthritis and hand surgery for a torn ligament. She reports joint pain in her hips, thumbs, back, and knees. She has been to physical therapy but reports that this did not work for her.     She reports easy bruising but no skin fragility. Her stiches in her hand did split open following recent surgery.     She has a hiatal hernia and GERD. She has been evaluated for possible esophageal spasms and chest pain. She has a cardiology appointment later this month but had a normal EKG in the ER when she presented for chest pain. She has not had an echo.     She reports muscle weakness in her back.     She does not have a family history of aneurysm or dissection of the aorta, organ prolapse, sudden death, or vascular fragility.     PAST MEDICAL HISTORY:   Dysphagia  Encounter for monitoring long-term proton pump inhibitor therapy  Asthmatic bronchitis  Seasonal allergies  Gastroesophageal reflux disease with esophagitis: see EGD 2015  Mixed hyperlipidemia    DEVELOPMENTAL HISTORY: typical development     FAMILY HISTORY: Ms. Muñiz does not have any children. She has a 39-year-old brother in good health with no major medical problems. Her mother is 70 and healthy. Her maternal grandparents  in their 70s of unknown medical etiology. Her  father is 74 and has arthritis. He has back problems and had to have his mitral valve replaced at age 71. He has scoliosis. There is no family history of sudden death, vascular or organ fragility, or known connective tissue disorders. She is of [place], [place] citizen, and [place] descent. Consanguinity was denied.     RECOMMENDATIONS: Ms. Muñiz is a 34-year-old female with hypermobility who presents for a connective tissue evaluation.     We discussed that joint hypermobility is common and that connective tissue disorders such as Portia-Danlos syndrome (EDS) are typically diagnosed clinically and/or with genetic testing. The most common type of EDS is hypermobile type (hEDS) which is evaluated clinically based off of specific criteria and that no genetic testing is currently available. Please see Dr. Burnett note for connective tissue evaluation and physical exam information.    Without a known genetic cause for Ms. Dolan hypermobility, recurrence risks are difficult to determine but may be as high as 50%. Hypermobile EDS and benign joint hypermobility syndrome typically follow an autosomal dominant pattern.     Please see Dr. Burnett note for additional medical management guidance and counseling.     RECOMMENDATIONS:  1. Please see Dr. Burnett note for recommendations.     TIME SPENT: 30 minutes with over 50% spent counseling.       Major Law M.D.                                                                          Niki Randolph, MPH, MS, [facility]                 Section Head - Medical Genetics                                                                                     Genetic Counselor  [facility]

## 2020-06-16 ENCOUNTER — TELEPHONE (OUTPATIENT)
Dept: GENETICS | Facility: CLINIC | Age: 35
End: 2020-06-16

## 2020-06-16 NOTE — PROGRESS NOTES
"Luz Muñiz    DOS: 6/15/20  : [birth year]  MRN: [id number]      REFERRING MD: Dorota Prasad DO     REASON FOR CONSULT: [facility] was asked to evaluate this 34-year-old female with hypermobility. She presents unaccompanied for todays visit.      PRESENT ILLNESS: Ms. Muñiz reports that shes been hyperextensible since childhood. She has never had a dislocation requiring procedure or surgery but reports frequent subluxations in the hips and thumbs. 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 She also has back pain and muscle weakness in her back. She has had surgery on her right thumb for arthritis and hand surgery for a torn ligament. She reports joint pain in her hips, thumbs, back, and knees. She has been to physical therapy but reports that this did not work for her. She reports easy bruising but no skin fragility. Her stiches in her hand did split open following recent surgery.       She was referred by her rheumatologist for an evaluation of possible diagnosis of Portia-Danlos syndrome (EDS). She has not had an echo but had a normal EKG when she presented to the ER for chest pain.     PAST MEDICAL HISTORY:   Hiatal hernia  GERD  Dysphagia  Suspected esophageal spasms  Encounter for monitoring long-term proton pump inhibitor therapy  Asthmatic bronchitis  Seasonal allergies  Gastroesophageal reflux disease with esophagitis: see EGD   Mixed hyperlipidemia     MEDICATIONS:  omega-3 fatty acids/fish oil (FISH OIL-OMEGA-3 FATTY ACIDS) 300-1,000 mg capsule     azelastine (ASTELIN) 137 mcg (0.1 %) nasal spray    b complex vitamins tablet    BD ALLERGIST TRAY REG BEVEL 1 mL 27 x 1/2" Syrg    CALCIUM ORAL    desog-e.estradiol/e.estradiol (KARIVA) 0.15-0.02 mgx21 /0.01 mg x 5 per tablet    escitalopram oxalate (LEXAPRO) 10 MG tablet    esomeprazole (NEXIUM) 40 MG capsule    etodolac (LODINE) 400 MG tablet    ferrous sulfate (FEOSOL) 325 mg (65 mg iron) Tab tablet    hyoscyamine " (LEVSIN/SL) 0.125 mg Subl    lamoTRIgine (LAMICTAL) 25 MG tablet    niacin 100 MG Tab    ondansetron (ZOFRAN) 4 MG tablet    traMADol (ULTRAM) 50 mg tablet      ALLERGIES: sulfa, codeine    DEVELOPMENTAL HISTORY: typical development      SOCIAL HISTORY: 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    FAMILY HISTORY: Ms. Muñiz does not have a family history of aneurysm or dissection of the aorta, organ prolapse, sudden death, or vascular fragility. She does not have any children. She has a 39-year-old brother in good health with no major medical problems. Her mother is 70 and healthy. Her maternal grandparents  in their 70s of unknown medical etiology. Her father is 74 and has arthritis. He has back problems and had to have his mitral valve replaced at age 71. He has scoliosis. There is no family history of sudden death, vascular or organ fragility, or known connective tissue disorders. She is of [place], [place], and [place] descent. Consanguinity was denied.     PHYSICAL EXAM: Wt: 147 lbs, Ht: 5, BMI 28  HEENT: Her head a normocephalic with no significant dysmorphic facial features. Her ears were normal in size, position, and morphology. Her palate was normally arched and there was no dental crowding. No Marfan facial features.  NECK: Supple.   CHEST: normally formed, she denied having pectus.  MUSCULOSKELETAL: She did have some hyperextensibility with 7 out of 9 points on the Beighton scale of hyperextensibility (minimum of 5 defines hypermobility). Her arm span to height ratio was normal. She did not have evidence of arachnodactyly or dolichostenomelia.   SKIN:  She had no subcutaneous ecchymoses and no evidence of smooth, velvety or translucent skin. There were no cutaneous stretch marks or atrophic scars.  NEUROLOGIC: Normal tone and strength. She had normal language and cognition.     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 It seems to be inherited in an autosomal dominant fashion (father).     EDS hypermobility type (EDSH) is in a differential but its hard to say with certainty because theres no confirmatory genetic testing and theres another entity with significant overlap called benign joint hypermobility syndrome (BJHS) and theres no fine line between BJHS and EDSH. EDS has 13 types out of which 12 types have genes associated with them and can be tested for. Genetic basis for EDSH or BJHS is unknown and no gene can be tested at this time. BJHS may just be a normal variation of connective tissue laxity and can be very difficult to distinguish from EDSH as reported by Len et al (2009). Overall 20% of population is hyperextensible.    Ms. Spence has an autosomal dominant pattern of joint hypermobility and is also present in her dad and thats characteristic for both EDSH and BJHS. For all constructive purposes, since theres no clear clinical distinction between them and no genetic testing available. She is at risk for complications characteristic of lax connective tissue whether its EDSH or BJHS. EDSH and BJHS are autosomal dominant with a 50% chance of being passed to the offspring.     While she has hyperextensibility she does not have history of tissue fragility, i.e. poor wound healing, thin translucent skin or atrophic scars that are more characteristic of EDS classic type (EDSC). Theres some overlap between EDSC and EDSH, with EDSC involving COL5A1 and COL5A2 gene mutations which has a 90% yield. Differential diagnosis of EDSH includes vascular EDS (EDSV) caused by mutations in the COL3A1 (98% yield).    Genetic testing for EDSC and EDSV is available but typically has a low yield especially in BJHS or EDSH while  variants of unknown significance can make diagnosis and treatment even more difficult. The patient decided not to proceed at this time.     Management of any connective tissue disorder at different ages includes reduction of joint hyperextension, resistance/isometric exercise, lifting heavy objects and avoidance of high-impact activity such as contact sports. Recreational swimming, jogging, biking and golf are more preferable. If objects need to be lifted from the ground, knees should be bent to grab the object and raise the body with the object, as opposed to lifting without bending the knees. The back is at a high risk for complications. Physical therapy for assistive devices (braces to improve joint stability) is recommended (referral to PT specializing in connective tissue laxity was given).     Myofascial release (any physical therapy modality that reduces spasm) provides short-term relief of pain, lasting hours to days. While the duration of benefit is short and it must be repeated frequently, this pain relief may be critical to facilitate participation in toning exercise for stabilization of the joints. Modalities must be tailored to the individual; a partial list includes heat, cold, massage, ultrasound, electrical stimulation, acupuncture, acupressure, biofeedback, and conscious relaxation. Low-resistance muscle toning exercise can improve joint stability and reduce future subluxations, dislocations, and pain. Progress should be made by gradually increasing repetitions, frequency, or duration, not resistance. It often takes months or years for significant progress to be recognized.    Improved joint stability may be achieved by low-resistance exercise to increase muscle tone (subconscious resting muscle contraction), as opposed to muscle strength (voluntary force exerted at will). Emphasis should be placed on both core and extremity muscle tone. Examples include walking, bicycling, low-impact aerobics,  swimming or water exercise, and simple range-of-motion exercise without added resistance. Core toning activities, such as balance exercises and repetitive motions focusing on the abdominal, lumbar, and interscapular muscles, are also important. High-impact activity increases the risk for acute subluxation/dislocation, chronic pain, and osteoarthritis. Some sports, such as tackle football, basketball or soccer, are therefore contraindicated. Gymnastics is typically discouraged or pursued with caution. However, most sports and activities are acceptable with appropriate precautions.    Wide- writing utensils can reduce strain on finger and hand joints. An unconventional grasp of a writing utensil, gently resting the shaft in the web between the thumb and index finger and securing the tip between the distal interphalangeal joints or middle phalanges of the index and third fingers (rather than using the tips of the fingers), results in substantially reduced axial stress to the interphalangeal, metacarpophalangeal, and carpometacarpal joints. These adjustments frequently result in marked reduction of pain in the index finger and at the base of the thumb. Chiropractic adjustment is not strictly contraindicated, but must be performed cautiously to avoid iatrogenic subluxations or dislocations.    Braces are useful to improve joint stability. Orthopedists, rheumatologists, and physical therapists can assist in recommending appropriate devices for commonly problematic joints such as knees and ankles. Shoulders and hips present more of a challenge for external bracing. Occupational therapists may be consulted for ring splints (to stabilize interphalangeal joints) and wrist or wrist/thumb braces in affected individuals with small joint instability. A soft neck collar, if tolerated, may help with neck pain and headaches. A waterbed, adjustable air mattress, or viscoelastic foam mattress (and/or pillow) may provide increased  support with improved sleep quality and less pain.    Francis also referred the patient to the Functional Restoration Program (FRP) at [facility] which helped many of my patients with chronic pain. [phone] [email or web address].    RECOMMENDATIONS:   1. COL5A1, COL5A2, COL3A1 (declined due to a low yield and risk for unclear variants).  2. No contact sports as above.  3. Management as above.  4. PT specializing in connective tissue laxity (referral given).  5. Referral to FRP.  6. Echocardiogram.  7. Follow up in 1-2 years prn.    REFERENCES:  1. Pankaj HP. Portia-Danlos Syndrome, Hypermobility Type. 2004 Oct 22 [Updated 2012 Sep 13]. In: Andrés RA, Derrick MP, Juan TD, et al., editors. CardioFocusws [Internet]. [place] ([place]): [facility]; 2483-0895. Available from: http://www.ncbi.nlm.nih.gov/books/HSE5507.  2. Len et al. Hypermobile Portia-Danlos syndrome (a.k.a. Portia-Danlos syndrome Type III and Portia-Danlos syndrome hypermobility type): Clinical description and natural history. Am J Med Reva C Semin Med Reva. 2017 Mar;175(1):48-69.    Time spent: 80 minutes, more than 50% counseling. The note will be faxed to the referring physician and is in epic.    Major Law M.D.                                                                 Section Head - Medical Genetics                                                    [facility]

## 2020-06-16 NOTE — TELEPHONE ENCOUNTER
----- Message from Major Law MD sent at 6/15/2020 10:38 PM CDT -----  I placed an order for echo and let the patient know - please help schedule

## 2020-06-28 PROBLEM — R07.9 CHEST PAIN AT REST: Status: ACTIVE | Noted: 2020-06-28

## 2020-06-29 ENCOUNTER — OFFICE VISIT (OUTPATIENT)
Dept: CARDIOLOGY | Facility: CLINIC | Age: 35
End: 2020-06-29
Payer: COMMERCIAL

## 2020-06-29 VITALS
OXYGEN SATURATION: 97 % | WEIGHT: 151 LBS | BODY MASS INDEX: 29.64 KG/M2 | HEIGHT: 60 IN | DIASTOLIC BLOOD PRESSURE: 80 MMHG | SYSTOLIC BLOOD PRESSURE: 120 MMHG | HEART RATE: 106 BPM

## 2020-06-29 DIAGNOSIS — R07.9 CHEST PAIN, UNSPECIFIED TYPE: ICD-10-CM

## 2020-06-29 DIAGNOSIS — R13.10 DYSPHAGIA, UNSPECIFIED TYPE: ICD-10-CM

## 2020-06-29 DIAGNOSIS — R07.9 CHEST PAIN AT REST: Primary | ICD-10-CM

## 2020-06-29 DIAGNOSIS — E78.2 MIXED HYPERLIPIDEMIA: ICD-10-CM

## 2020-06-29 DIAGNOSIS — M24.9 HYPERMOBILE JOINTS: ICD-10-CM

## 2020-06-29 PROCEDURE — 93000 ELECTROCARDIOGRAM COMPLETE: CPT | Mod: S$GLB,,, | Performed by: INTERNAL MEDICINE

## 2020-06-29 PROCEDURE — 99999 PR PBB SHADOW E&M-EST. PATIENT-LVL V: CPT | Mod: PBBFAC,,, | Performed by: INTERNAL MEDICINE

## 2020-06-29 PROCEDURE — 3008F BODY MASS INDEX DOCD: CPT | Mod: CPTII,S$GLB,, | Performed by: INTERNAL MEDICINE

## 2020-06-29 PROCEDURE — 93000 EKG 12-LEAD: ICD-10-PCS | Mod: S$GLB,,, | Performed by: INTERNAL MEDICINE

## 2020-06-29 PROCEDURE — 99204 PR OFFICE/OUTPT VISIT, NEW, LEVL IV, 45-59 MIN: ICD-10-PCS | Mod: 25,S$GLB,, | Performed by: INTERNAL MEDICINE

## 2020-06-29 PROCEDURE — 99204 OFFICE O/P NEW MOD 45 MIN: CPT | Mod: 25,S$GLB,, | Performed by: INTERNAL MEDICINE

## 2020-06-29 PROCEDURE — 99999 PR PBB SHADOW E&M-EST. PATIENT-LVL V: ICD-10-PCS | Mod: PBBFAC,,, | Performed by: INTERNAL MEDICINE

## 2020-06-29 PROCEDURE — 3008F PR BODY MASS INDEX (BMI) DOCUMENTED: ICD-10-PCS | Mod: CPTII,S$GLB,, | Performed by: INTERNAL MEDICINE

## 2020-06-29 RX ORDER — ROSUVASTATIN CALCIUM 10 MG/1
10 TABLET, COATED ORAL DAILY
Qty: 30 TABLET | Refills: 6 | Status: SHIPPED | OUTPATIENT
Start: 2020-06-29 | End: 2021-01-21

## 2020-06-29 RX ORDER — ACETAMINOPHEN 160 MG/5ML
200 SUSPENSION, ORAL (FINAL DOSE FORM) ORAL DAILY
COMMUNITY
Start: 2020-06-29 | End: 2023-01-26

## 2020-06-29 NOTE — PROGRESS NOTES
"Subjective:   Patient ID:  Luz Muñiz is a 34 y.o. female who presents for evaluation of Chest Pain      HPI:   Luz Muñiz presents for evaluation of chest pain.For 2 years, she has had a nearly constant aching in her sternum. She will relieve it by " popping" her sternum or upper back. May be a little worse with exercise that she has recently not done due to thumb surgery. Luz Muñiz denies  shortness of breath, palpitations, presyncope , or syncope. She is being evaluated by a  for joint hypermobility possibly EDS. Luz Muñiz has dyslipidemia with her father having CAD. Last . Developped myalgias from atorvastatin   Review of Systems   Constitution: Negative for malaise/fatigue, weight gain and weight loss.   Eyes: Negative for blurred vision.   Cardiovascular: Negative for chest pain, claudication, cyanosis, dyspnea on exertion, irregular heartbeat, leg swelling, near-syncope, orthopnea, palpitations, paroxysmal nocturnal dyspnea and syncope.   Respiratory: Negative for cough, shortness of breath and wheezing.    Musculoskeletal: Positive for joint pain. Negative for falls and myalgias.   Gastrointestinal: Positive for nausea. Negative for abdominal pain, heartburn and vomiting.        GERD   Genitourinary: Negative for nocturia.   Neurological: Negative for brief paralysis, dizziness, focal weakness, headaches, numbness, paresthesias and weakness.   Psychiatric/Behavioral: Negative for altered mental status.       Current Outpatient Medications   Medication Sig    azelastine (ASTELIN) 137 mcg (0.1 %) nasal spray 1 spray by Nasal route 2 (two) times daily.    b complex vitamins tablet Take 1 tablet by mouth once daily.    BD ALLERGIST TRAY REG BEVEL 1 mL 27 x 1/2" Syrg USE ONCE WEEKLY AS DIRECTED    CALCIUM ORAL Take by mouth.    desog-e.estradiol/e.estradiol (KARIVA) 0.15-0.02 mgx21 /0.01 mg x 5 per tablet Take 1 tablet by mouth once daily.    " escitalopram oxalate (LEXAPRO) 10 MG tablet Take 10 mg by mouth nightly.    esomeprazole (NEXIUM) 40 MG capsule Take 1 capsule (40 mg total) by mouth before breakfast.    etodolac (LODINE) 400 MG tablet Take 1 tablet (400 mg total) by mouth every 6 (six) hours as needed (pain).    ferrous sulfate (FEOSOL) 325 mg (65 mg iron) Tab tablet Take 1 tablet (325 mg total) by mouth every 12 (twelve) hours.    hyoscyamine (LEVSIN/SL) 0.125 mg Subl PLEASE SEE ATTACHED FOR DETAILED DIRECTIONS    lamoTRIgine (LAMICTAL) 25 MG tablet Take 50 mg by mouth 2 (two) times daily.    niacin 100 MG Tab Take 100 mg by mouth every evening.    omega-3 fatty acids/fish oil (FISH OIL-OMEGA-3 FATTY ACIDS) 300-1,000 mg capsule Take 1 capsule by mouth once daily.    ondansetron (ZOFRAN) 4 MG tablet Take 1 tablet (4 mg total) by mouth every 8 (eight) hours as needed for Nausea.    traMADol (ULTRAM) 50 mg tablet TAKE 1 TABLET BY MOUTH EVERY 4 HOURS AS NEEDED PAIN    UNABLE TO FIND Physical Therapy - evaluate and treat this patient with EDS  [facility] in [place] [phone] (Patient not taking: Reported on 6/29/2020)     No current facility-administered medications for this visit.      Objective:   Physical Exam   Constitutional: She is oriented to person, place, and time. She appears well-developed. No distress.   /80   Pulse 106   Ht 5' (1.524 m)   Wt 68.5 kg (151 lb 0.2 oz)   SpO2 97%   BMI 29.49 kg/m²    HENT:   Head: Normocephalic.   Eyes: Pupils are equal, round, and reactive to light. Conjunctivae are normal. No scleral icterus.   Neck: Neck supple. No JVD present. No thyromegaly present.   Cardiovascular: Normal rate, regular rhythm, normal heart sounds and intact distal pulses. PMI is not displaced. Exam reveals no gallop and no friction rub.   No murmur heard.  Pulmonary/Chest: Effort normal and breath sounds normal. No respiratory distress. She has no wheezes. She has no rales.   Abdominal: Soft. She exhibits  no distension. There is no splenomegaly or hepatomegaly. There is no abdominal tenderness.   Musculoskeletal:         General: No tenderness or edema.      Comments: Gait normal   Neurological: She is alert and oriented to person, place, and time.   Skin: Skin is warm and dry. She is not diaphoretic.   Psychiatric: She has a normal mood and affect. Her behavior is normal.       Lab Results   Component Value Date     05/25/2020    K 4.0 05/25/2020     05/25/2020    CO2 23 05/25/2020    BUN 11 05/25/2020    CREATININE 0.8 05/25/2020    GLU 92 05/25/2020    MG 2.1 05/25/2020    AST 16 05/25/2020    ALT 17 05/25/2020    ALBUMIN 3.7 05/25/2020    PROT 7.2 05/25/2020    BILITOT 0.3 05/25/2020    WBC 8.05 05/25/2020    HGB 12.2 05/25/2020    HCT 39.7 05/25/2020    MCV 89 05/25/2020     05/25/2020    TSH 2.371 05/25/2020    CHOL 295 (H) 03/08/2019    HDL 60 03/08/2019    LDLCALC 205.8 (H) 03/08/2019    TRIG 146 03/08/2019       Assessment:     1. Chest pain at rest : Atypical for cardiac origin           2. Mixed hyperlipidemia :  with intolerance to atorvastatin   3       Hypermobility of joints    Plan:     Luz was seen today for chest pain.    Diagnoses and all orders for this visit:    Chest pain at rest  -     EKG 12-lead; Future            Mixed hyperlipidemia  -     rosuvastatin (CRESTOR) 10 MG tablet; Take 1 tablet (10 mg total) by mouth once daily.  -     Lipid Panel; Future; Expected date: 08/28/2020  -     Comprehensive metabolic panel; Future; Expected date: 08/28/2020  -     coenzyme Q10 200 mg capsule; Take 200 mg by mouth once daily.    Hypermobile joints  Echo ordered

## 2020-06-29 NOTE — LETTER
June 29, 2020      Cornelio Webb MD  [address]  [place] [address]           [facility] Card[facility]  [address]  [facility] [address]  Phone: [phone]          Patient: Luz Muñiz   MR Number: [id number]   YOB: 1985   Date of Visit: 6/29/2020       Dear Dr. Cornelio Webb:    Thank you for referring Luz Muñiz to me for evaluation. Attached you will find relevant portions of my assessment and plan of care.    If you have questions, please do not hesitate to call me. I look forward to following Luz Muñiz along with you.    Sincerely,    Yon Bautista MD    Enclosure  CC:  No Recipients    If you would like to receive this communication electronically, please contact [email or web address] or [phone] to request more information on [employer] Link access.    For providers and/or their staff who would like to refer a patient to [facility], please contact us through our one-stop-shop provider referral line, [facility], at [phone].    If you feel you have received this communication in error or would no longer like to receive these types of communications, please e-mail [email or web address]

## 2020-07-01 ENCOUNTER — TELEPHONE (OUTPATIENT)
Dept: PAIN MEDICINE | Facility: OTHER | Age: 35
End: 2020-07-01

## 2020-07-07 ENCOUNTER — TELEPHONE (OUTPATIENT)
Dept: PAIN MEDICINE | Facility: OTHER | Age: 35
End: 2020-07-07

## 2020-07-16 ENCOUNTER — HOSPITAL ENCOUNTER (OUTPATIENT)
Dept: CARDIOLOGY | Facility: HOSPITAL | Age: 35
Discharge: HOME | End: 2020-07-16
Attending: MEDICAL GENETICS
Payer: COMMERCIAL

## 2020-07-16 VITALS
DIASTOLIC BLOOD PRESSURE: 72 MMHG | BODY MASS INDEX: 29.64 KG/M2 | SYSTOLIC BLOOD PRESSURE: 118 MMHG | HEIGHT: 60 IN | HEART RATE: 82 BPM | WEIGHT: 151 LBS

## 2020-07-16 DIAGNOSIS — M24.9 HYPERMOBILE JOINTS: ICD-10-CM

## 2020-07-16 LAB
ASCENDING AORTA: 2.05 CM
AV INDEX (PROSTH): 0.65
AV MEAN GRADIENT: 4 MMHG
AV PEAK GRADIENT: 7 MMHG
AV VALVE AREA: 1.99 CM2
AV VELOCITY RATIO: 0.67
BSA FOR ECHO PROCEDURE: 1.7 M2
CV ECHO LV RWT: 0.32 CM
DOP CALC AO PEAK VEL: 1.29 M/S
DOP CALC AO VTI: 25.29 CM
DOP CALC LVOT AREA: 3.1 CM2
DOP CALC LVOT DIAMETER: 1.98 CM
DOP CALC LVOT PEAK VEL: 0.86 M/S
DOP CALC LVOT STROKE VOLUME: 50.32 CM3
DOP CALCLVOT PEAK VEL VTI: 16.35 CM
E WAVE DECELERATION TIME: 136.09 MSEC
E/A RATIO: 1.52
E/E' RATIO: 5.19 M/S
ECHO LV POSTERIOR WALL: 0.61 CM (ref 0.6–1.1)
FRACTIONAL SHORTENING: 33 % (ref 28–44)
INTERVENTRICULAR SEPTUM: 0.54 CM (ref 0.6–1.1)
IVRT: 74.22 MSEC
LA MAJOR: 4.6 CM
LA MINOR: 4.59 CM
LA WIDTH: 3.27 CM
LEFT ATRIUM SIZE: 2.25 CM
LEFT ATRIUM VOLUME INDEX: 17.3 ML/M2
LEFT ATRIUM VOLUME: 28.74 CM3
LEFT INTERNAL DIMENSION IN SYSTOLE: 2.57 CM (ref 2.1–4)
LEFT VENTRICLE DIASTOLIC VOLUME INDEX: 38.89 ML/M2
LEFT VENTRICLE DIASTOLIC VOLUME: 64.42 ML
LEFT VENTRICLE MASS INDEX: 35 G/M2
LEFT VENTRICLE SYSTOLIC VOLUME INDEX: 14.4 ML/M2
LEFT VENTRICLE SYSTOLIC VOLUME: 23.86 ML
LEFT VENTRICULAR INTERNAL DIMENSION IN DIASTOLE: 3.86 CM (ref 3.5–6)
LEFT VENTRICULAR MASS: 57.37 G
LV LATERAL E/E' RATIO: 5.38 M/S
LV SEPTAL E/E' RATIO: 5 M/S
MV PEAK A VEL: 0.46 M/S
MV PEAK E VEL: 0.7 M/S
MV STENOSIS PRESSURE HALF TIME: 39.46 MS
MV VALVE AREA P 1/2 METHOD: 5.58 CM2
PISA TR MAX VEL: 2.19 M/S
PULM VEIN S/D RATIO: 1.04
PV PEAK D VEL: 0.46 M/S
PV PEAK S VEL: 0.48 M/S
RA MAJOR: 4.27 CM
RA PRESSURE: 3 MMHG
RA WIDTH: 3.04 CM
RIGHT VENTRICULAR END-DIASTOLIC DIMENSION: 2.68 CM
RV TISSUE DOPPLER FREE WALL SYSTOLIC VELOCITY 1 (APICAL 4 CHAMBER VIEW): 12.39 CM/S
SINUS: 2.23 CM
STJ: 2.07 CM
TDI LATERAL: 0.13 M/S
TDI SEPTAL: 0.14 M/S
TDI: 0.14 M/S
TR MAX PG: 19 MMHG
TRICUSPID ANNULAR PLANE SYSTOLIC EXCURSION: 1.81 CM
TV REST PULMONARY ARTERY PRESSURE: 22 MMHG

## 2020-07-16 PROCEDURE — 93306 TTE W/DOPPLER COMPLETE: CPT

## 2020-07-16 PROCEDURE — 93306 ECHO (CUPID ONLY): ICD-10-PCS | Mod: 26,,, | Performed by: INTERNAL MEDICINE

## 2020-07-16 PROCEDURE — 93306 TTE W/DOPPLER COMPLETE: CPT | Mod: 26,,, | Performed by: INTERNAL MEDICINE

## 2020-07-17 ENCOUNTER — TELEPHONE (OUTPATIENT)
Dept: PAIN MEDICINE | Facility: OTHER | Age: 35
End: 2020-07-17

## 2020-07-17 NOTE — TELEPHONE ENCOUNTER
Tried calling pt to reschedule visit currently scheduled for next Friday, 7/24 as I will be out of town. No answer. No voicemail. Will message via portal

## 2020-07-20 ENCOUNTER — TELEPHONE (OUTPATIENT)
Dept: PAIN MEDICINE | Facility: OTHER | Age: 35
End: 2020-07-20

## 2020-07-21 ENCOUNTER — TELEPHONE (OUTPATIENT)
Dept: PAIN MEDICINE | Facility: OTHER | Age: 35
End: 2020-07-21

## 2020-08-06 ENCOUNTER — INITIAL CONSULT (OUTPATIENT)
Dept: PAIN MEDICINE | Facility: OTHER | Age: 35
End: 2020-08-06
Attending: MEDICAL GENETICS
Payer: COMMERCIAL

## 2020-08-06 DIAGNOSIS — G89.29 CHRONIC PAIN OF BOTH WRISTS: ICD-10-CM

## 2020-08-06 DIAGNOSIS — M25.532 CHRONIC PAIN OF BOTH WRISTS: ICD-10-CM

## 2020-08-06 DIAGNOSIS — M79.643 CHRONIC HAND PAIN, UNSPECIFIED LATERALITY: ICD-10-CM

## 2020-08-06 DIAGNOSIS — G89.29 CHRONIC LOW BACK PAIN WITHOUT SCIATICA, UNSPECIFIED BACK PAIN LATERALITY: ICD-10-CM

## 2020-08-06 DIAGNOSIS — G89.29 CHRONIC HAND PAIN, UNSPECIFIED LATERALITY: ICD-10-CM

## 2020-08-06 DIAGNOSIS — F41.9 ANXIETY: ICD-10-CM

## 2020-08-06 DIAGNOSIS — R52 PAIN AGGRAVATED BY ACTIVITIES OF DAILY LIVING: Primary | ICD-10-CM

## 2020-08-06 DIAGNOSIS — G89.4 CHRONIC PAIN DISORDER: ICD-10-CM

## 2020-08-06 DIAGNOSIS — Z78.9 DECREASED ACTIVITIES OF DAILY LIVING (ADL): ICD-10-CM

## 2020-08-06 DIAGNOSIS — F32.A DEPRESSION, UNSPECIFIED DEPRESSION TYPE: ICD-10-CM

## 2020-08-06 DIAGNOSIS — M25.531 CHRONIC PAIN OF BOTH WRISTS: ICD-10-CM

## 2020-08-06 DIAGNOSIS — M54.50 CHRONIC LOW BACK PAIN WITHOUT SCIATICA, UNSPECIFIED BACK PAIN LATERALITY: ICD-10-CM

## 2020-08-06 DIAGNOSIS — M24.9 HYPERMOBILE JOINTS: ICD-10-CM

## 2020-08-06 PROCEDURE — 99204 OFFICE O/P NEW MOD 45 MIN: CPT | Mod: ,,, | Performed by: NURSE PRACTITIONER

## 2020-08-06 PROCEDURE — 99204 PR OFFICE/OUTPT VISIT, NEW, LEVL IV, 45-59 MIN: ICD-10-PCS | Mod: ,,, | Performed by: NURSE PRACTITIONER

## 2020-08-06 NOTE — LETTER
August 6, 2020      Major Law MD  [address]  [facility] [address]           [id number]  [address], [address]  [facility] [address]  Phone: [phone]  Fax: [phone]          Patient: Luz Muñiz   MR Number: [id number]   YOB: 1985   Date of Visit: 8/6/2020       Dear Dr. Major Law:    Thank you for referring Luz Muñiz to me for evaluation. Attached you will find relevant portions of my assessment and plan of care.    If you have questions, please do not hesitate to call me. I look forward to following Luz Muñiz along with you.    Sincerely,    Niki Thomas, FATOU    Enclosure  CC:  No Recipients    If you would like to receive this communication electronically, please contact [email or web address] or [phone] to request more information on "[employer] Link access.    For providers and/or their staff who would like to refer a patient to [facility], please contact us through our one-stop-shop provider referral line, [facility], at [phone].    If you feel you have received this communication in error or would no longer like to receive these types of communications, please e-mail [email or web address]

## 2020-08-06 NOTE — PROGRESS NOTES
[facility]    Initial Medical Screening Visit Note    Subjective:       Chief Complaint Requiring Rehabilitation: Chronic Pain    Consulted by: Dr. Law (genetics)      HPI:    Luz Muñiz is a 34 y.o. F who presents today with chronic pain. Reports pain in hands, wrists, back, hips. She also has intermittent pain around her sternum. Was dx recently with EDS/Hypermoblity. Has been dealing with pain on a chronic basis since she was 18 yo. Tore a ligament in her right wrist at 18 yo from unknown cause. 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 Started having back pain at 20 yo which gradually came on and has progressed. The pain that she has is not constant. The pain is exacerbated by stress, working out (was cycling, going to gym doing weights regularly until pandemic), weather changes. The pain is better with stretching (especially her back pain), CBD oil, NSAIDs and muscle relaxants as needed, ice, heat. Has not tried acupuncture. Used to see chiropractor regularly until quarantine started. The pain radiates into her legs occasionally but not frequently; will radiate into lateral aspect of RLE when it does. Denies paresthesias or weakness.     She has been told that her back pain is muscle related. Spine MRIs have been 'totally normal'. PT and chiropractic care have been advised. She has not tried PT. She is concerned that PT will cause more pain.     She has a PMH of allergies (takes allergy shots), EDS/Hypermobility, GERD.   She has a hiatal hernia and acid reflux and nausea occasionally and is followed by GI. Has had endoscopy, colonoscopy.   Has seen Cardiology per referall from Dr. Law given EDS dx and reports that results were WNL.       1. Ambulatory status:  Walks independently.     2. Balance problems?  No.     3. Exercise routine?  Yes- walking and some resistance exercises at home. She cut down on exercise after thumb surgery.     4. Physical  Therapy?  Has never had PT for her back. Has had a lot of hand therapy.     5. Current pain/mood/sleep medications:  Lexapro, lamotrigine, melatonin, norco (for post-op use only now and during hand PT)    6. Pain management injections?  Yes- used to get cortisone injections in her thumb every month and used to get right wrist injections     7. Relevant surgeries?  Left thumb surgery 2 months ago (doing PT now)  2 right wrist surgeries     8. Pain affecting function at work, home, and/or recreationally?  Yes    9. Pain affecting sleep?  Sometimes. If she takes pain medication it will be at night because she does PT HEP 4 times daily which can be painful.     10. Pain affecting mood?  Yes- has hx of Depression and anxiety- takes Lexapro and Lamotrigine   Followed by psychiatrist for depression and anxiety. Has been seeing psych for 5 years.      Suicidal ideation presently? no     Hx of suicide attempt? no    Hx of psychiatric hospitalization? no      11. Work status  Is a - self-employed     12. Social information:     Lives with: her dog      Smoker? No; no hx smoking       Alcohol use? Occasional      Drug use? No      History of substance abuse? No     Past Medical History:   Diagnosis Date    Arthritis     GERD (gastroesophageal reflux disease)     Hyperlipidemia     Seasonal allergies 6/10/2015       Past Surgical History:   Procedure Laterality Date    ESOPHAGEAL MANOMETRY WITH MEASUREMENT OF IMPEDANCE N/A 12/23/2019    Procedure: MANOMETRY, ESOPHAGUS, WITH IMPEDANCE MEASUREMENT;  Surgeon: Bradly Germain MD;  Location: [facility] ([address] [place] [address]);  Service: Endoscopy;  Laterality: N/A;  12/17 - pt confirmed    TONSILLECTOMY  2014    WRIST SURGERY      Ligament repair of Right wrist x 2       Review of patient's allergies indicates:   Allergen Reactions    Codeine Rash    Sulfamethoxazole-trimethoprim Nausea And Vomiting and Other (See Comments)       Current Outpatient Medications   Medication  "Sig Dispense Refill    azelastine (ASTELIN) 137 mcg (0.1 %) nasal spray 1 spray by Nasal route 2 (two) times daily.  5    b complex vitamins tablet Take 1 tablet by mouth once daily.      BD ALLERGIST TRAY REG BEVEL 1 mL 27 x 1/2" Syrg USE ONCE WEEKLY AS DIRECTED      CALCIUM ORAL Take by mouth.      coenzyme Q10 200 mg capsule Take 200 mg by mouth once daily.      escitalopram oxalate (LEXAPRO) 10 MG tablet Take 10 mg by mouth nightly.  3    esomeprazole (NEXIUM) 40 MG capsule Take 1 capsule (40 mg total) by mouth before breakfast. 30 capsule 11    etodolac (LODINE) 400 MG tablet Take 1 tablet (400 mg total) by mouth every 6 (six) hours as needed (pain). 60 tablet 5    ferrous sulfate (FEOSOL) 325 mg (65 mg iron) Tab tablet Take 1 tablet (325 mg total) by mouth every 12 (twelve) hours. 60 tablet 4    hyoscyamine (LEVSIN/SL) 0.125 mg Subl PLEASE SEE ATTACHED FOR DETAILED DIRECTIONS 60 tablet 0    lamoTRIgine (LAMICTAL) 25 MG tablet Take 50 mg by mouth 2 (two) times daily.  3    niacin 100 MG Tab Take 100 mg by mouth every evening.      omega-3 fatty acids/fish oil (FISH OIL-OMEGA-3 FATTY ACIDS) 300-1,000 mg capsule Take 1 capsule by mouth once daily.      ondansetron (ZOFRAN) 4 MG tablet Take 1 tablet (4 mg total) by mouth every 8 (eight) hours as needed for Nausea. 30 tablet 0    rosuvastatin (CRESTOR) 10 MG tablet Take 1 tablet (10 mg total) by mouth once daily. 30 tablet 6    traMADol (ULTRAM) 50 mg tablet TAKE 1 TABLET BY MOUTH EVERY 4 HOURS AS NEEDED PAIN  0    UNABLE TO FIND Physical Therapy - evaluate and treat this patient with EDS  [facility] in [place] -[phone] (Patient not taking: Reported on 6/29/2020) 1 each 0    VIORELE, 28, 0.15-0.02 mgx21 /0.01 mg x 5 per tablet TAKE 1 TABLET BY MOUTH EVERY DAY 28 tablet 11     No current facility-administered medications for this visit.        Family History   Problem Relation Age of Onset    Hyperlipidemia Father     Heart disease " Father         valve replacement    Hyperlipidemia Mother     Cancer Maternal Grandmother         skin    No Known Problems Brother     Acne Neg Hx     Colon cancer Neg Hx     Ovarian cancer Neg Hx     Breast cancer Neg Hx     Diabetes Neg Hx     Hypertension Neg Hx     Eclampsia Neg Hx     Miscarriages / Stillbirths Neg Hx      labor Neg Hx     Stroke Neg Hx     Colon polyps Neg Hx     Liver cancer Neg Hx     Liver disease Neg Hx     Cirrhosis Neg Hx     Rectal cancer Neg Hx     Stomach cancer Neg Hx     Esophageal cancer Neg Hx     Celiac disease Neg Hx     Inflammatory bowel disease Neg Hx     Crohn's disease Neg Hx        Social History     Socioeconomic History    Marital status: Single     Spouse name: Not on file    Number of children: Not on file    Years of education: Not on file    Highest education level: Not on file   Occupational History    Occupation:    Social Needs    Financial resource strain: Somewhat hard    Food insecurity     Worry: Never true     Inability: Never true    Transportation needs     Medical: No     Non-medical: No   Tobacco Use    Smoking status: Never Smoker    Smokeless tobacco: Never Used   Substance and Sexual Activity    Alcohol use: Yes     Alcohol/week: 1.0 standard drinks     Types: 1 Glasses of wine per week     Frequency: 2-4 times a month     Drinks per session: 1 or 2     Binge frequency: Never     Comment: socially    Drug use: No    Sexual activity: Not Currently     Partners: Male     Birth control/protection: OCP   Lifestyle    Physical activity     Days per week: 3 days     Minutes per session: 70 min    Stress: To some extent   Relationships    Social connections     Talks on phone: Not on file     Gets together: Twice a week     Attends Alevism service: Not on file     Active member of club or organization: No     Attends meetings of clubs or organizations: Never     Relationship status: Never     Other Topics Concern    Are you pregnant or think you may be? No    Breast-feeding No   Social History Narrative    Not on file              Objective:        GEN: Well developed, well nourished. No acute distress. Fully alert, oriented, and appropriate. Speech normal stevie.   PSYCH: Normal affect. Thought content appropriate.  CHEST: Breathing symmetric. No audible wheezing.  SKIN: Warm, dry. No rash or discoloration on exposed areas.   NEURO/MUSCULOSKELETAL:  Cervical: ROM full and painless; 5/5 UE strength; gross sensation and reflexes intact bilaterally; Negative Watters's bilaterally.  Lumbar: ROM full and painless; 5/5 LE strength bilaterally; gross sensation and reflexes intact bilaterally; negative clonus bilaterally  SLR negative bilaterally (sitting)  JOEL negative bilaterally (sitting)           Imaging:      EXAMINATION:  MRI LUMBAR SPINE WITHOUT CONTRAST     CLINICAL HISTORY:  LBP, LLE pain; Radiculopathy, lumbar region     TECHNIQUE:  Multiplanar, multisequence MR images were acquired from the thoracolumbar junction to the sacrum without the administration of contrast.     COMPARISON:  Radiograph 08/19/2019.     FINDINGS:  Routine lumbar spine MRI performed without contrast.  No spondylolisthesis.  No spondylolysis.  Vertebral body heights are well maintained without evidence for fracture.  Benign osseous hemangioma noted within the right lateral aspect of the L1 vertebral body.  No marrow signal abnormality to suggest an infiltrative process.     Distal spinal cord demonstrates normal contour and signal intensity.  Cauda equina is normal without findings to suggest arachnoiditis.  Conus medullaris terminates at T12-L1.     Limited evaluation of the retroperitoneal organs demonstrates no significant abnormalities.  There is a suspected T2 hyperintense focus within the inferior aspect of the spleen, possibly a cyst but incompletely evaluated on this exam.     T12-L1: No spinal canal stenosis or  neural foraminal narrowing.     L1-L2: No spinal canal stenosis or neural foraminal narrowing.     L2-L3: No spinal canal stenosis or neural foraminal narrowing.     L3-L4: No spinal canal stenosis or neural foraminal narrowing.     L4-L5: No spinal canal stenosis or neural foraminal narrowing.     L5-S1: No spinal canal stenosis or neural foraminal narrowing.     Impression:     1. No MR imaging findings to account for the reported history of left lower extremity pain.        Electronically signed by: Raman Ashraf MD  Date:                                            11/08/2019  Time:                                           09:31    EXAMINATION:  MRI THORACIC SPINE WITHOUT CONTRAST     CLINICAL HISTORY:  Abnormal xray, thoracic spine, DJD; Abnormal findings on diagnostic imaging of other parts of musculoskeletal system     TECHNIQUE:  Multiplanar, multisequence MR imaging of the thoracic spine without the administration of contrast.     COMPARISON:  Thoracic spine radiograph from 08/19/2019     FINDINGS:  Thoracic vertebral bodies demonstrate no evidence of fracture, osseous destructive process, or aggressive bone marrow replacement process.     Normal sagittal alignment is preserved.     Intervertebral disc space heights are well-maintained. No focal disc abnormality, spinal canal stenosis, or neural foraminal narrowing is evident at any level.     Visualized spinal cord demonstrates normal caliber, morphology, and signal.     No spinal canal or paraspinous mass.  Visualized paraspinous soft tissue structures are normal.     Impression:     Normal examination of the thoracic spine.     Electronically signed by resident: Brian Chappell  Date:                                            08/28/2019  Time:                                           16:10     Electronically signed by: Kirt Sabillon MD  Date:                                            08/28/2019  Time:                                           21:25         EXAMINATION:  XR THORACIC SPINE AP LATERAL     CLINICAL HISTORY:  Pain in thoracic spine     FINDINGS:  There is mild DJD.  Alignment is normal.  No fracture dislocation bone destruction seen.     Impression:     Mild DJD.    EXAMINATION:  XR LUMBAR SPINE AP AND LATERAL     CLINICAL HISTORY:  Low back pain, >6wks conservative tx, persistent-progressive sx, surgical candidate;  Radiculopathy, lumbar region     FINDINGS:  Alignment is normal.  No fracture dislocation bone destruction seen.  Disc spaces are maintained.     Impression:     No acute process seen.        Electronically signed by: Chandra Finn MD  Date:                                            08/19/2019  Time:                                           11:23    Time of Procedure: 07/16/15 14:34:34  Accession # [id number]     Comparison: None.     Views: AP, lateral, odontoid views of cervical spine, 3 views     Findings:   I detect no soft tissue abnormality on AP or lateral views of the neck.  Specifically I detect no radiopaque retained foreign body in the airway or esophagus.     On lateral view the inferior aspect of C7 and the entirety of T1 are obscured by overlying tissues.  There is straightening of normal cervical lordosis, a finding that can be seen with muscle strain.     Within the limits of the study vertebral body heights, alignment and intervertebral disc spaces are maintained as are the prevertebral soft tissues and pre-odontoid space.     No cervical ribs are present.  Transverse processes of C7 are hypertrophied and could contribute to impingement.  Imaged portions of the lungs appear unremarkable.  IMPRESSION:    Unremarkable examination     Assessment:     Encounter Diagnoses   Name Primary?    Hypermobile joints     Pain aggravated by activities of daily living Yes    Decreased activities of daily living (ADL)     Chronic pain disorder     Chronic hand pain, unspecified laterality     Chronic pain of both wrists      Chronic low back pain without sciatica, unspecified back pain laterality     Anxiety     Depression, unspecified depression type        Plan:     Diagnoses and all orders for this visit:    Pain aggravated by activities of daily living    Hypermobile joints  -     Ambulatory referral/consult to [facility]    Decreased activities of daily living (ADL)    Chronic pain disorder    Chronic hand pain, unspecified laterality    Chronic pain of both wrists    Chronic low back pain without sciatica, unspecified back pain laterality    Anxiety    Depression, unspecified depression type        Luz Muñiz is a 34 y.o. 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 She has EDS/Hypermobiilty. Today we discussed the importance of an active/self-management approach to managing her chronic pain. She has done a good job of staying active and seems proactive about treatment for her depression and anxiety. She continues to work as a  but this has been becoming increasingly difficult due to pain.     We discussed FRP in detail. We discussed that this program is to enhance functional abilities and QOL and decrease disability. Education and counseling about chronic pain and the importance of an active, self-management treatment program in addition to passive treatments (sugery, injections) and medications if indicated was provided to enhance understanding of the treatment approach to chronic pain and in FRP.     Luz Muñiz is interested in FRP, but she is unsure if she can participate due to her work schedule. We discussed a few ways in which she might be able to overcome this barrier, but at this time she is unsure. She is also currently in hand PT. She is also considering looking into going to the PT that Dr. Law recommended who specialized in hypermobility.     At this time she will consider her options and reach out if needed. I have also advised that we  may soon have a chronic pain education/self-management support program available if she is interested and that I will update her with new information as it comes. She is open to this. I will also send her info on 'Living a Healthy Life with Chronic Pain' which is a self-management guide created at [facility] and available for purchase online.      I spent a total of 40 minutes with the patient, and greater than 50% of the time was spent in counseling and education.     The above plan and management options were discussed at length with patient. Patient is in agreement with the above and verbalized understanding. It will be communicated with the referring physician via electronic record, fax, or mail.

## 2020-08-07 ENCOUNTER — LAB VISIT (OUTPATIENT)
Dept: LAB | Facility: HOSPITAL | Age: 35
End: 2020-08-07
Attending: INTERNAL MEDICINE
Payer: COMMERCIAL

## 2020-08-07 DIAGNOSIS — E61.1 IRON DEFICIENCY: ICD-10-CM

## 2020-08-07 LAB
FERRITIN SERPL-MCNC: 13 NG/ML (ref 20–300)
HGB BLD-MCNC: 12.7 G/DL (ref 12–16)
IRON SERPL-MCNC: 79 UG/DL (ref 30–160)
SATURATED IRON: 13 % (ref 20–50)
TOTAL IRON BINDING CAPACITY: 593 UG/DL (ref 250–450)
TRANSFERRIN SERPL-MCNC: 401 MG/DL (ref 200–375)

## 2020-08-07 PROCEDURE — 83540 ASSAY OF IRON: CPT

## 2020-08-07 PROCEDURE — 36415 COLL VENOUS BLD VENIPUNCTURE: CPT

## 2020-08-07 PROCEDURE — 85018 HEMOGLOBIN: CPT

## 2020-08-07 PROCEDURE — 82728 ASSAY OF FERRITIN: CPT

## 2020-08-09 DIAGNOSIS — E61.1 IRON DEFICIENCY: ICD-10-CM

## 2020-08-09 RX ORDER — FERROUS SULFATE 325(65) MG
325 TABLET ORAL EVERY 12 HOURS
Qty: 60 TABLET | Refills: 4 | Status: SHIPPED | OUTPATIENT
Start: 2020-08-09 | End: 2020-09-19 | Stop reason: SDUPTHER

## 2020-08-13 ENCOUNTER — TELEPHONE (OUTPATIENT)
Dept: PAIN MEDICINE | Facility: OTHER | Age: 35
End: 2020-08-13

## 2020-08-14 ENCOUNTER — TELEPHONE (OUTPATIENT)
Dept: GASTROENTEROLOGY | Facility: CLINIC | Age: 35
End: 2020-08-14

## 2020-08-14 ENCOUNTER — TELEPHONE (OUTPATIENT)
Dept: ENDOSCOPY | Facility: HOSPITAL | Age: 35
End: 2020-08-14

## 2020-08-14 ENCOUNTER — DOCUMENTATION ONLY (OUTPATIENT)
Dept: ENDOSCOPY | Facility: HOSPITAL | Age: 35
End: 2020-08-14

## 2020-08-14 NOTE — TELEPHONE ENCOUNTER
----- Message from Cornelio Webb MD sent at 8/9/2020  4:01 PM CDT -----  Ada- please tell patient that they are iron deficient and anaemic and recommend that they take ferrous sulfate one 325mg pill every 12 hours for next 4 months and repeat fasting hemoglobin and Ferritin in 8 weeks - Orders placed.    Please remind Luz to schedule her EGD and colonoscopy if not done so

## 2020-08-14 NOTE — TELEPHONE ENCOUNTER
Received in basket message regarding EGD/Colonoscopy order. Called patient. Left voicemail message with direct number to call back.

## 2020-08-14 NOTE — PROGRESS NOTES
Good Morning Ms. Escobar,     Your results show you are iron deficient and anaemic and recommend that you take ferrous sulfate one 325mg pill every 12 hours for next 4 months and repeat fasting hemoglobin and Ferritin in 8 weeks -  Appointment scheduled on 10/09/2020.     Please call our endoscopy schedulers at [phone] to schedule your EGD and colonoscopy if not done so.     Thanks,  MANINDER Caballero

## 2020-08-18 ENCOUNTER — PATIENT MESSAGE (OUTPATIENT)
Dept: INTERNAL MEDICINE | Facility: CLINIC | Age: 35
End: 2020-08-18

## 2020-08-27 ENCOUNTER — OFFICE VISIT (OUTPATIENT)
Dept: INTERNAL MEDICINE | Facility: CLINIC | Age: 35
End: 2020-08-27
Payer: COMMERCIAL

## 2020-08-27 VITALS
DIASTOLIC BLOOD PRESSURE: 66 MMHG | HEART RATE: 100 BPM | BODY MASS INDEX: 29.39 KG/M2 | RESPIRATION RATE: 16 BRPM | WEIGHT: 149.69 LBS | SYSTOLIC BLOOD PRESSURE: 100 MMHG | TEMPERATURE: 97 F | HEIGHT: 60 IN | OXYGEN SATURATION: 98 %

## 2020-08-27 DIAGNOSIS — K12.2 UVULITIS: Primary | ICD-10-CM

## 2020-08-27 PROCEDURE — 96372 THER/PROPH/DIAG INJ SC/IM: CPT | Mod: S$GLB,,, | Performed by: NURSE PRACTITIONER

## 2020-08-27 PROCEDURE — 99999 PR PBB SHADOW E&M-EST. PATIENT-LVL IV: ICD-10-PCS | Mod: PBBFAC,,, | Performed by: NURSE PRACTITIONER

## 2020-08-27 PROCEDURE — 99999 PR PBB SHADOW E&M-EST. PATIENT-LVL IV: CPT | Mod: PBBFAC,,, | Performed by: NURSE PRACTITIONER

## 2020-08-27 PROCEDURE — 3008F PR BODY MASS INDEX (BMI) DOCUMENTED: ICD-10-PCS | Mod: CPTII,S$GLB,, | Performed by: NURSE PRACTITIONER

## 2020-08-27 PROCEDURE — 96372 PR INJECTION,THERAP/PROPH/DIAG2ST, IM OR SUBCUT: ICD-10-PCS | Mod: S$GLB,,, | Performed by: NURSE PRACTITIONER

## 2020-08-27 PROCEDURE — 99213 OFFICE O/P EST LOW 20 MIN: CPT | Mod: 25,S$GLB,, | Performed by: NURSE PRACTITIONER

## 2020-08-27 PROCEDURE — 99213 PR OFFICE/OUTPT VISIT, EST, LEVL III, 20-29 MIN: ICD-10-PCS | Mod: 25,S$GLB,, | Performed by: NURSE PRACTITIONER

## 2020-08-27 PROCEDURE — 3008F BODY MASS INDEX DOCD: CPT | Mod: CPTII,S$GLB,, | Performed by: NURSE PRACTITIONER

## 2020-08-27 RX ORDER — BETAMETHASONE SODIUM PHOSPHATE AND BETAMETHASONE ACETATE 3; 3 MG/ML; MG/ML
6 INJECTION, SUSPENSION INTRA-ARTICULAR; INTRALESIONAL; INTRAMUSCULAR; SOFT TISSUE ONCE
Status: COMPLETED | OUTPATIENT
Start: 2020-08-27 | End: 2020-08-27

## 2020-08-27 RX ORDER — HYDROCODONE BITARTRATE AND ACETAMINOPHEN 10; 325 MG/1; MG/1
TABLET ORAL
COMMUNITY
Start: 2020-07-27 | End: 2023-06-12

## 2020-08-27 RX ORDER — AMOXICILLIN 875 MG/1
875 TABLET, FILM COATED ORAL EVERY 12 HOURS
Qty: 14 TABLET | Refills: 0 | Status: SHIPPED | OUTPATIENT
Start: 2020-08-27 | End: 2020-08-27

## 2020-08-27 RX ORDER — AMOXICILLIN 875 MG/1
875 TABLET, FILM COATED ORAL EVERY 12 HOURS
Qty: 14 TABLET | Refills: 0 | Status: SHIPPED | OUTPATIENT
Start: 2020-08-27 | End: 2021-10-06 | Stop reason: ALTCHOICE

## 2020-08-27 RX ADMIN — BETAMETHASONE SODIUM PHOSPHATE AND BETAMETHASONE ACETATE 6 MG: 3; 3 INJECTION, SUSPENSION INTRA-ARTICULAR; INTRALESIONAL; INTRAMUSCULAR; SOFT TISSUE at 02:08

## 2020-08-27 NOTE — PROGRESS NOTES
"INTERNAL MEDICINE URGENT CARE NOTE    CHIEF COMPLAINT     Chief Complaint   Patient presents with    Sore Throat    Nasal Congestion       HPI     Luz Muñiz is a 34 y.o. female with HLD, GERD, and AR who presents for an urgent visit today.    Here with c/o sore throat and nasal congestion and sinus pain and pressure x 1 day.   Denies fever, chills and nightsweats.   Treated with tylenol, sudafed and mucinex .           Past Medical History:  Past Medical History:   Diagnosis Date    Arthritis     GERD (gastroesophageal reflux disease)     Hyperlipidemia     Seasonal allergies 6/10/2015       Home Medications:  Prior to Admission medications    Medication Sig Start Date End Date Taking? Authorizing Provider   azelastine (ASTELIN) 137 mcg (0.1 %) nasal spray 1 spray by Nasal route 2 (two) times daily. 3/9/17  Yes Historical Provider, MD   b complex vitamins tablet Take 1 tablet by mouth once daily.   Yes Historical Provider, MD   BD ALLERGIST TRAY REG BEVEL 1 mL 27 x 1/2" Syrg USE ONCE WEEKLY AS DIRECTED 5/27/20  Yes Historical Provider, MD   CALCIUM ORAL Take by mouth.   Yes Historical Provider, MD   coenzyme Q10 200 mg capsule Take 200 mg by mouth once daily. 6/29/20 6/29/21 Yes Yon Bautista MD   escitalopram oxalate (LEXAPRO) 10 MG tablet Take 10 mg by mouth nightly. 8/19/19  Yes Historical Provider, MD   esomeprazole (NEXIUM) 40 MG capsule Take 1 capsule (40 mg total) by mouth before breakfast. 1/24/20  Yes Dorota Prasad DO   ferrous sulfate (FEOSOL) 325 mg (65 mg iron) Tab tablet Take 1 tablet (325 mg total) by mouth every 12 (twelve) hours. 8/9/20  Yes Cornelio Webb MD   HYDROcodone-acetaminophen (NORCO)  mg per tablet TAKE ONE TABLET BY MOUTH EVERY 4 6 HOURS AS NEEDED FOR PAIN 7/27/20  Yes Historical Provider, MD   hyoscyamine (LEVSIN/SL) 0.125 mg Subl PLEASE SEE ATTACHED FOR DETAILED DIRECTIONS 4/30/20  Yes Cornelio Webb MD   lamoTRIgine (LAMICTAL) 25 MG tablet Take 50 mg by " mouth 2 (two) times daily. 8/10/19  Yes Historical Provider, MD   niacin 100 MG Tab Take 100 mg by mouth every evening.   Yes Historical Provider, MD   omega-3 fatty acids/fish oil (FISH OIL-OMEGA-3 FATTY ACIDS) 300-1,000 mg capsule Take 1 capsule by mouth once daily.   Yes Historical Provider, MD   ondansetron (ZOFRAN) 4 MG tablet Take 1 tablet (4 mg total) by mouth every 8 (eight) hours as needed for Nausea. 9/25/19  Yes Dorota Prasad DO   rosuvastatin (CRESTOR) 10 MG tablet Take 1 tablet (10 mg total) by mouth once daily. 6/29/20 6/29/21 Yes Yon Bautista MD   traMADol (ULTRAM) 50 mg tablet TAKE 1 TABLET BY MOUTH EVERY 4 HOURS AS NEEDED PAIN 8/16/19  Yes Historical Provider, MD HERNANDEZ, 28, 0.15-0.02 mgx21 /0.01 mg x 5 per tablet TAKE 1 TABLET BY MOUTH EVERY DAY 7/13/20  Yes Ifeoma Gordon MD   UNABLE TO FIND Physical Therapy - evaluate and treat this patient with EDS  [facility] in [place] [phone]  Patient not taking: Reported on 6/29/2020 6/15/20   Major Law MD       Review of Systems:  Review of Systems   Constitutional: Negative for chills, fatigue and fever.   HENT: Positive for congestion, postnasal drip, sinus pressure, sinus pain and sore throat. Negative for sneezing.    Respiratory: Negative for cough, shortness of breath and wheezing.    Cardiovascular: Negative for chest pain and palpitations.   Gastrointestinal: Negative for abdominal pain, constipation, diarrhea, nausea and vomiting.        +reflux and indigestion    Skin: Negative for rash.   Neurological: Negative for dizziness, light-headedness and headaches.       Health Maintainence:   Immunizations:  Health Maintenance       Date Due Completion Date    Influenza Vaccine (1) 09/01/2020 12/11/2019    Cervical Cancer Screening 05/08/2022 5/8/2019    TETANUS VACCINE 02/22/2027 2/22/2017           PHYSICAL EXAM     /88 (BP Location: Right arm, Patient Position: Sitting, BP Method: Medium (Manual))   Pulse 100    Temp 97.3 °F (36.3 °C) (Temporal)   Resp 16   Ht 5' (1.524 m)   Wt 67.9 kg (149 lb 11.1 oz)   SpO2 98%   BMI 29.23 kg/m²     Physical Exam  Vitals signs reviewed.   Constitutional:       Appearance: She is well-developed.   HENT:      Head: Normocephalic.      Right Ear: External ear normal.      Left Ear: External ear normal.      Nose: Nose normal.      Mouth/Throat:      Mouth: Mucous membranes are moist.      Pharynx: Uvula swelling present. No oropharyngeal exudate.      Tonsils: No tonsillar exudate or tonsillar abscesses. 0 on the right. 0 on the left.     Eyes:      Pupils: Pupils are equal, round, and reactive to light.   Neck:      Musculoskeletal: Neck supple.      Thyroid: No thyromegaly.      Vascular: No JVD.      Trachea: No tracheal deviation.   Cardiovascular:      Rate and Rhythm: Normal rate and regular rhythm.      Heart sounds: Normal heart sounds. No murmur. No friction rub. No gallop.    Pulmonary:      Effort: Pulmonary effort is normal. No respiratory distress.      Breath sounds: Normal breath sounds. No wheezing or rales.   Abdominal:      General: Bowel sounds are normal. There is no distension.      Palpations: Abdomen is soft.      Tenderness: There is no abdominal tenderness.   Musculoskeletal: Normal range of motion.         General: No tenderness.   Lymphadenopathy:      Cervical: No cervical adenopathy.   Skin:     General: Skin is warm and dry.      Findings: No rash.   Neurological:      Mental Status: She is alert and oriented to person, place, and time.   Psychiatric:         Behavior: Behavior normal.         LABS     No results found for: LABA1C, HGBA1C  CMP  Sodium   Date Value Ref Range Status   05/25/2020 139 136 - 145 mmol/L Final     Potassium   Date Value Ref Range Status   05/25/2020 4.0 3.5 - 5.1 mmol/L Final     Chloride   Date Value Ref Range Status   05/25/2020 107 95 - 110 mmol/L Final     CO2   Date Value Ref Range Status   05/25/2020 23 23 - 29 mmol/L  Final     Glucose   Date Value Ref Range Status   05/25/2020 92 70 - 110 mg/dL Final     BUN, Bld   Date Value Ref Range Status   05/25/2020 11 6 - 20 mg/dL Final     Creatinine   Date Value Ref Range Status   05/25/2020 0.8 0.5 - 1.4 mg/dL Final     Calcium   Date Value Ref Range Status   05/25/2020 9.3 8.7 - 10.5 mg/dL Final     Total Protein   Date Value Ref Range Status   05/25/2020 7.2 6.0 - 8.4 g/dL Final     Albumin   Date Value Ref Range Status   05/25/2020 3.7 3.5 - 5.2 g/dL Final     Total Bilirubin   Date Value Ref Range Status   05/25/2020 0.3 0.1 - 1.0 mg/dL Final     Comment:     For infants and newborns, interpretation of results should be based  on gestational age, weight and in agreement with clinical  observations.  Premature Infant recommended reference ranges:  Up to 24 hours.............<8.0 mg/dL  Up to 48 hours............<12.0 mg/dL  3-5 days..................<15.0 mg/dL  6-29 days.................<15.0 mg/dL       Alkaline Phosphatase   Date Value Ref Range Status   05/25/2020 62 55 - 135 U/L Final     AST   Date Value Ref Range Status   05/25/2020 16 10 - 40 U/L Final     ALT   Date Value Ref Range Status   05/25/2020 17 10 - 44 U/L Final     Anion Gap   Date Value Ref Range Status   05/25/2020 9 8 - 16 mmol/L Final     eGFR if    Date Value Ref Range Status   05/25/2020 >60.0 >60 mL/min/1.73 m^2 Final     eGFR if non    Date Value Ref Range Status   05/25/2020 >60.0 >60 mL/min/1.73 m^2 Final     Comment:     Calculation used to obtain the estimated glomerular filtration  rate (eGFR) is the CKD-EPI equation.        Lab Results   Component Value Date    WBC 8.05 05/25/2020    HGB 12.7 08/07/2020    HCT 39.7 05/25/2020    MCV 89 05/25/2020     05/25/2020     Lab Results   Component Value Date    CHOL 295 (H) 03/08/2019    CHOL 254 (H) 12/08/2017    CHOL 278 (H) 04/25/2017     Lab Results   Component Value Date    HDL 60 03/08/2019    HDL 51 12/08/2017     HDL 61 04/25/2017     Lab Results   Component Value Date    LDLCALC 205.8 (H) 03/08/2019    LDLCALC 175.8 (H) 12/08/2017    LDLCALC 189.6 (H) 04/25/2017     Lab Results   Component Value Date    TRIG 146 03/08/2019    TRIG 136 12/08/2017    TRIG 137 04/25/2017     Lab Results   Component Value Date    CHOLHDL 20.3 03/08/2019    CHOLHDL 20.1 12/08/2017    CHOLHDL 21.9 04/25/2017     Lab Results   Component Value Date    TSH 2.371 05/25/2020       ASSESSMENT/PLAN     Luz Muñiz is a 34 y.o. female     Uvulitis- will admin celestone IM. Amoxicillin bid. Magic mouthwash as needed. Warm salt water gargles.   -     Discontinue: amoxicillin (AMOXIL) 875 MG tablet; Take 1 tablet (875 mg total) by mouth every 12 (twelve) hours.  Dispense: 14 tablet; Refill: 0  -     amoxicillin (AMOXIL) 875 MG tablet; Take 1 tablet (875 mg total) by mouth every 12 (twelve) hours.  Dispense: 14 tablet; Refill: 0  -     betamethasone acetate-betamethasone sodium phosphate injection 6 mg    Follow up with PCP    Patient education provided from Alex. Patient was counseled on when and how to seek emergent care.       Tracy GREENE, KOURTNEY, FNP-c   Department of Internal Medicine - [facility]  2:13 PM

## 2020-09-02 ENCOUNTER — TELEPHONE (OUTPATIENT)
Dept: PAIN MEDICINE | Facility: OTHER | Age: 35
End: 2020-09-02

## 2020-09-18 ENCOUNTER — LAB VISIT (OUTPATIENT)
Dept: LAB | Facility: HOSPITAL | Age: 35
End: 2020-09-18
Attending: INTERNAL MEDICINE
Payer: COMMERCIAL

## 2020-09-18 DIAGNOSIS — E61.1 IRON DEFICIENCY: ICD-10-CM

## 2020-09-18 LAB
FERRITIN SERPL-MCNC: 23 NG/ML (ref 20–300)
HGB BLD-MCNC: 12.3 G/DL (ref 12–16)
IRON SERPL-MCNC: 41 UG/DL (ref 30–160)
SATURATED IRON: 8 % (ref 20–50)
TOTAL IRON BINDING CAPACITY: 530 UG/DL (ref 250–450)
TRANSFERRIN SERPL-MCNC: 358 MG/DL (ref 200–375)

## 2020-09-18 PROCEDURE — 85018 HEMOGLOBIN: CPT

## 2020-09-18 PROCEDURE — 82728 ASSAY OF FERRITIN: CPT

## 2020-09-18 PROCEDURE — 83540 ASSAY OF IRON: CPT

## 2020-09-18 PROCEDURE — 36415 COLL VENOUS BLD VENIPUNCTURE: CPT

## 2020-09-19 DIAGNOSIS — E61.1 IRON DEFICIENCY: ICD-10-CM

## 2020-09-19 RX ORDER — FERROUS SULFATE 325(65) MG
325 TABLET ORAL EVERY 12 HOURS
Qty: 60 TABLET | Refills: 4 | Status: SHIPPED | OUTPATIENT
Start: 2020-09-19 | End: 2020-12-20 | Stop reason: SDUPTHER

## 2020-09-22 ENCOUNTER — TELEPHONE (OUTPATIENT)
Dept: GASTROENTEROLOGY | Facility: CLINIC | Age: 35
End: 2020-09-22

## 2020-09-22 NOTE — TELEPHONE ENCOUNTER
----- Message from Cornelio Webb MD sent at 9/19/2020 12:11 PM CDT -----  Ada- please tell patient that they are iron deficient and anemic and recommend that they take ferrous sulfate one 325mg pill every 12 hours for next 4 months.    Please order repeat fasting Hemoglobin, Iron/TIBC, and Ferritin in 12 weeks - Orders placed.

## 2020-09-28 ENCOUNTER — PATIENT MESSAGE (OUTPATIENT)
Dept: INTERNAL MEDICINE | Facility: CLINIC | Age: 35
End: 2020-09-28

## 2020-09-28 RX ORDER — ESCITALOPRAM OXALATE 10 MG/1
10 TABLET ORAL NIGHTLY
Qty: 90 TABLET | Refills: 3 | Status: SHIPPED | OUTPATIENT
Start: 2020-09-28 | End: 2021-12-20 | Stop reason: SDUPTHER

## 2020-09-28 NOTE — TELEPHONE ENCOUNTER
As long as her mood is stable, I am comfortable prescribing the lexapro but I do not prescribe lamictal. I will send the lexapro for refill now

## 2020-09-28 NOTE — TELEPHONE ENCOUNTER
Pt aware and expresses understanding. Pt would like to taper off of the Lamictal; would like tapering instructions

## 2020-09-28 NOTE — TELEPHONE ENCOUNTER
I'm not familiar with that med at all. Would recommend she call her psychiatrist and ask how to taper off that med

## 2020-12-14 ENCOUNTER — LAB VISIT (OUTPATIENT)
Dept: LAB | Facility: HOSPITAL | Age: 35
End: 2020-12-14
Attending: INTERNAL MEDICINE
Payer: COMMERCIAL

## 2020-12-14 DIAGNOSIS — E61.1 IRON DEFICIENCY: ICD-10-CM

## 2020-12-14 LAB
FERRITIN SERPL-MCNC: 34 NG/ML (ref 20–300)
HGB BLD-MCNC: 13.3 G/DL (ref 12–16)
IRON SERPL-MCNC: 84 UG/DL (ref 30–160)
SATURATED IRON: 16 % (ref 20–50)
TOTAL IRON BINDING CAPACITY: 533 UG/DL (ref 250–450)
TRANSFERRIN SERPL-MCNC: 360 MG/DL (ref 200–375)

## 2020-12-14 PROCEDURE — 82728 ASSAY OF FERRITIN: CPT

## 2020-12-14 PROCEDURE — 85018 HEMOGLOBIN: CPT

## 2020-12-14 PROCEDURE — 36415 COLL VENOUS BLD VENIPUNCTURE: CPT

## 2020-12-14 PROCEDURE — 83540 ASSAY OF IRON: CPT

## 2020-12-20 DIAGNOSIS — R13.19 ESOPHAGEAL DYSPHAGIA: ICD-10-CM

## 2020-12-20 DIAGNOSIS — E61.1 IRON DEFICIENCY: ICD-10-CM

## 2020-12-20 DIAGNOSIS — K21.9 GASTROESOPHAGEAL REFLUX DISEASE, UNSPECIFIED WHETHER ESOPHAGITIS PRESENT: Primary | ICD-10-CM

## 2020-12-20 RX ORDER — FERROUS SULFATE 325(65) MG
325 TABLET ORAL EVERY 12 HOURS
Qty: 60 TABLET | Refills: 4 | Status: SHIPPED | OUTPATIENT
Start: 2020-12-20 | End: 2021-04-02 | Stop reason: SDUPTHER

## 2020-12-21 ENCOUNTER — TELEPHONE (OUTPATIENT)
Dept: GASTROENTEROLOGY | Facility: CLINIC | Age: 35
End: 2020-12-21

## 2020-12-21 ENCOUNTER — PATIENT MESSAGE (OUTPATIENT)
Dept: GASTROENTEROLOGY | Facility: CLINIC | Age: 35
End: 2020-12-21

## 2020-12-21 NOTE — TELEPHONE ENCOUNTER
----- Message from Ev Shay MA sent at 12/21/2020  9:44 AM CST -----    ----- Message -----  From: Cornelio Webb MD  Sent: 12/20/2020   2:41 PM CST  To: Ev Shay MA, Dorota Prasad, DO    Sybil- please tell patient that they are iron deficient but not anemic and recommend that they take ferrous sulfate one 325mg pill every 12 hours for next 4 months.    Please order repeat fasting Hemoglobin, Iron/TIBC, and Ferritin in 12 weeks - Orders placed.

## 2021-01-20 DIAGNOSIS — E78.2 MIXED HYPERLIPIDEMIA: ICD-10-CM

## 2021-01-21 RX ORDER — ROSUVASTATIN CALCIUM 10 MG/1
TABLET, COATED ORAL
Qty: 30 TABLET | Refills: 6 | Status: SHIPPED | OUTPATIENT
Start: 2021-01-21 | End: 2021-08-23 | Stop reason: SDUPTHER

## 2021-03-19 ENCOUNTER — PATIENT MESSAGE (OUTPATIENT)
Dept: GASTROENTEROLOGY | Facility: CLINIC | Age: 36
End: 2021-03-19

## 2021-03-23 ENCOUNTER — TELEPHONE (OUTPATIENT)
Dept: INTERNAL MEDICINE | Facility: CLINIC | Age: 36
End: 2021-03-23

## 2021-03-23 ENCOUNTER — LAB VISIT (OUTPATIENT)
Dept: LAB | Facility: HOSPITAL | Age: 36
End: 2021-03-23
Attending: INTERNAL MEDICINE
Payer: COMMERCIAL

## 2021-03-23 DIAGNOSIS — Z00.00 ANNUAL PHYSICAL EXAM: Primary | ICD-10-CM

## 2021-03-23 DIAGNOSIS — K21.9 GASTROESOPHAGEAL REFLUX DISEASE, UNSPECIFIED WHETHER ESOPHAGITIS PRESENT: ICD-10-CM

## 2021-03-23 DIAGNOSIS — R13.19 ESOPHAGEAL DYSPHAGIA: ICD-10-CM

## 2021-03-23 DIAGNOSIS — E61.1 IRON DEFICIENCY: ICD-10-CM

## 2021-03-23 LAB
FERRITIN SERPL-MCNC: 67 NG/ML (ref 20–300)
HGB BLD-MCNC: 14 G/DL (ref 12–16)
IRON SERPL-MCNC: 88 UG/DL (ref 30–160)
SATURATED IRON: 19 % (ref 20–50)
TOTAL IRON BINDING CAPACITY: 474 UG/DL (ref 250–450)
TRANSFERRIN SERPL-MCNC: 320 MG/DL (ref 200–375)

## 2021-03-23 PROCEDURE — 85018 HEMOGLOBIN: CPT | Performed by: INTERNAL MEDICINE

## 2021-03-23 PROCEDURE — 36415 COLL VENOUS BLD VENIPUNCTURE: CPT | Performed by: INTERNAL MEDICINE

## 2021-03-23 PROCEDURE — 82728 ASSAY OF FERRITIN: CPT | Performed by: INTERNAL MEDICINE

## 2021-03-23 PROCEDURE — 83540 ASSAY OF IRON: CPT | Performed by: INTERNAL MEDICINE

## 2021-04-02 DIAGNOSIS — R13.19 ESOPHAGEAL DYSPHAGIA: ICD-10-CM

## 2021-04-02 DIAGNOSIS — K21.9 GASTROESOPHAGEAL REFLUX DISEASE, UNSPECIFIED WHETHER ESOPHAGITIS PRESENT: ICD-10-CM

## 2021-04-02 DIAGNOSIS — E61.1 IRON DEFICIENCY: Primary | ICD-10-CM

## 2021-04-02 RX ORDER — FERROUS SULFATE 325(65) MG
325 TABLET ORAL EVERY 12 HOURS
Qty: 60 TABLET | Refills: 4 | Status: SHIPPED | OUTPATIENT
Start: 2021-04-02

## 2021-04-08 ENCOUNTER — TELEPHONE (OUTPATIENT)
Dept: GASTROENTEROLOGY | Facility: CLINIC | Age: 36
End: 2021-04-08

## 2021-04-19 ENCOUNTER — TELEPHONE (OUTPATIENT)
Dept: GASTROENTEROLOGY | Facility: CLINIC | Age: 36
End: 2021-04-19

## 2021-04-19 ENCOUNTER — PATIENT MESSAGE (OUTPATIENT)
Dept: GASTROENTEROLOGY | Facility: CLINIC | Age: 36
End: 2021-04-19

## 2021-04-20 DIAGNOSIS — R13.19 ESOPHAGEAL DYSPHAGIA: ICD-10-CM

## 2021-04-20 DIAGNOSIS — E61.1 IRON DEFICIENCY: Primary | ICD-10-CM

## 2021-04-20 DIAGNOSIS — Z01.812 PRE-PROCEDURE LAB EXAM: ICD-10-CM

## 2021-04-20 DIAGNOSIS — K50.019 TERMINAL ILEITIS WITH COMPLICATION: ICD-10-CM

## 2021-04-20 DIAGNOSIS — Z12.11 SPECIAL SCREENING FOR MALIGNANT NEOPLASMS, COLON: Primary | ICD-10-CM

## 2021-04-20 RX ORDER — SODIUM, POTASSIUM,MAG SULFATES 17.5-3.13G
1 SOLUTION, RECONSTITUTED, ORAL ORAL
Qty: 1 BOTTLE | Refills: 0 | Status: SHIPPED | OUTPATIENT
Start: 2021-04-20 | End: 2023-01-26

## 2021-04-26 ENCOUNTER — PATIENT MESSAGE (OUTPATIENT)
Dept: ORTHOPEDICS | Facility: CLINIC | Age: 36
End: 2021-04-26

## 2021-05-23 ENCOUNTER — LAB VISIT (OUTPATIENT)
Dept: SPORTS MEDICINE | Facility: CLINIC | Age: 36
End: 2021-05-23
Payer: COMMERCIAL

## 2021-05-23 DIAGNOSIS — Z01.812 PRE-PROCEDURE LAB EXAM: ICD-10-CM

## 2021-05-23 LAB — SARS-COV-2 RNA RESP QL NAA+PROBE: NOT DETECTED

## 2021-05-23 PROCEDURE — U0005 INFEC AGEN DETEC AMPLI PROBE: HCPCS | Performed by: FAMILY MEDICINE

## 2021-05-23 PROCEDURE — U0003 INFECTIOUS AGENT DETECTION BY NUCLEIC ACID (DNA OR RNA); SEVERE ACUTE RESPIRATORY SYNDROME CORONAVIRUS 2 (SARS-COV-2) (CORONAVIRUS DISEASE [COVID-19]), AMPLIFIED PROBE TECHNIQUE, MAKING USE OF HIGH THROUGHPUT TECHNOLOGIES AS DESCRIBED BY CMS-2020-01-R: HCPCS | Performed by: FAMILY MEDICINE

## 2021-05-25 ENCOUNTER — ANESTHESIA EVENT (OUTPATIENT)
Dept: ENDOSCOPY | Facility: HOSPITAL | Age: 36
End: 2021-05-25
Payer: COMMERCIAL

## 2021-05-26 ENCOUNTER — ANESTHESIA (OUTPATIENT)
Dept: ENDOSCOPY | Facility: HOSPITAL | Age: 36
End: 2021-05-26
Payer: COMMERCIAL

## 2021-05-26 ENCOUNTER — HOSPITAL ENCOUNTER (OUTPATIENT)
Facility: HOSPITAL | Age: 36
Discharge: HOME | End: 2021-05-26
Attending: INTERNAL MEDICINE | Admitting: INTERNAL MEDICINE
Payer: COMMERCIAL

## 2021-05-26 VITALS
HEART RATE: 97 BPM | WEIGHT: 140 LBS | DIASTOLIC BLOOD PRESSURE: 65 MMHG | TEMPERATURE: 98 F | BODY MASS INDEX: 27.48 KG/M2 | HEIGHT: 60 IN | OXYGEN SATURATION: 99 % | RESPIRATION RATE: 16 BRPM | SYSTOLIC BLOOD PRESSURE: 94 MMHG

## 2021-05-26 DIAGNOSIS — E61.1 IRON DEFICIENCY: ICD-10-CM

## 2021-05-26 LAB
B-HCG UR QL: NEGATIVE
CTP QC/QA: YES

## 2021-05-26 PROCEDURE — 88305 TISSUE EXAM BY PATHOLOGIST: CPT | Mod: 26,,, | Performed by: STUDENT IN AN ORGANIZED HEALTH CARE EDUCATION/TRAINING PROGRAM

## 2021-05-26 PROCEDURE — 88342 IMHCHEM/IMCYTCHM 1ST ANTB: CPT | Mod: 26,,, | Performed by: STUDENT IN AN ORGANIZED HEALTH CARE EDUCATION/TRAINING PROGRAM

## 2021-05-26 PROCEDURE — 45385 PR COLONOSCOPY,REMV LESN,SNARE: ICD-10-PCS | Mod: ,,, | Performed by: INTERNAL MEDICINE

## 2021-05-26 PROCEDURE — E9220 PRA ENDO ANESTHESIA: ICD-10-PCS | Mod: ,,, | Performed by: NURSE ANESTHETIST, CERTIFIED REGISTERED

## 2021-05-26 PROCEDURE — 81025 URINE PREGNANCY TEST: CPT | Performed by: INTERNAL MEDICINE

## 2021-05-26 PROCEDURE — 43239 EGD BIOPSY SINGLE/MULTIPLE: CPT | Performed by: INTERNAL MEDICINE

## 2021-05-26 PROCEDURE — 27201012 HC FORCEPS, HOT/COLD, DISP: Performed by: INTERNAL MEDICINE

## 2021-05-26 PROCEDURE — 25000003 PHARM REV CODE 250: Performed by: INTERNAL MEDICINE

## 2021-05-26 PROCEDURE — 27201089 HC SNARE, DISP (ANY): Performed by: INTERNAL MEDICINE

## 2021-05-26 PROCEDURE — 43239 PR EGD, FLEX, W/BIOPSY, SGL/MULTI: ICD-10-PCS | Mod: 51,,, | Performed by: INTERNAL MEDICINE

## 2021-05-26 PROCEDURE — E9220 PRA ENDO ANESTHESIA: HCPCS | Mod: ,,, | Performed by: NURSE ANESTHETIST, CERTIFIED REGISTERED

## 2021-05-26 PROCEDURE — 45385 COLONOSCOPY W/LESION REMOVAL: CPT | Mod: ,,, | Performed by: INTERNAL MEDICINE

## 2021-05-26 PROCEDURE — 63600175 PHARM REV CODE 636 W HCPCS: Performed by: NURSE ANESTHETIST, CERTIFIED REGISTERED

## 2021-05-26 PROCEDURE — 88342 IMHCHEM/IMCYTCHM 1ST ANTB: CPT | Performed by: STUDENT IN AN ORGANIZED HEALTH CARE EDUCATION/TRAINING PROGRAM

## 2021-05-26 PROCEDURE — 43239 EGD BIOPSY SINGLE/MULTIPLE: CPT | Mod: 51,,, | Performed by: INTERNAL MEDICINE

## 2021-05-26 PROCEDURE — 88305 TISSUE EXAM BY PATHOLOGIST: ICD-10-PCS | Mod: 26,,, | Performed by: STUDENT IN AN ORGANIZED HEALTH CARE EDUCATION/TRAINING PROGRAM

## 2021-05-26 PROCEDURE — 37000008 HC ANESTHESIA 1ST 15 MINUTES: Performed by: INTERNAL MEDICINE

## 2021-05-26 PROCEDURE — 25000003 PHARM REV CODE 250: Performed by: NURSE ANESTHETIST, CERTIFIED REGISTERED

## 2021-05-26 PROCEDURE — 88305 TISSUE EXAM BY PATHOLOGIST: CPT | Mod: 59 | Performed by: STUDENT IN AN ORGANIZED HEALTH CARE EDUCATION/TRAINING PROGRAM

## 2021-05-26 PROCEDURE — 88342 CHG IMMUNOCYTOCHEMISTRY: ICD-10-PCS | Mod: 26,,, | Performed by: STUDENT IN AN ORGANIZED HEALTH CARE EDUCATION/TRAINING PROGRAM

## 2021-05-26 PROCEDURE — 37000009 HC ANESTHESIA EA ADD 15 MINS: Performed by: INTERNAL MEDICINE

## 2021-05-26 PROCEDURE — 45385 COLONOSCOPY W/LESION REMOVAL: CPT | Performed by: INTERNAL MEDICINE

## 2021-05-26 RX ORDER — PROPOFOL 10 MG/ML
VIAL (ML) INTRAVENOUS
Status: DISCONTINUED | OUTPATIENT
Start: 2021-05-26 | End: 2021-05-26

## 2021-05-26 RX ORDER — SODIUM CHLORIDE 9 MG/ML
INJECTION, SOLUTION INTRAVENOUS CONTINUOUS
Status: DISCONTINUED | OUTPATIENT
Start: 2021-05-26 | End: 2021-05-26 | Stop reason: HOSPADM

## 2021-05-26 RX ORDER — LIDOCAINE HYDROCHLORIDE 20 MG/ML
INJECTION INTRAVENOUS
Status: DISCONTINUED | OUTPATIENT
Start: 2021-05-26 | End: 2021-05-26

## 2021-05-26 RX ORDER — PROPOFOL 10 MG/ML
VIAL (ML) INTRAVENOUS CONTINUOUS PRN
Status: DISCONTINUED | OUTPATIENT
Start: 2021-05-26 | End: 2021-05-26

## 2021-05-26 RX ADMIN — GLYCOPYRROLATE 0.2 MG: 0.2 INJECTION, SOLUTION INTRAMUSCULAR; INTRAVITREAL at 09:05

## 2021-05-26 RX ADMIN — PROPOFOL 40 MG: 10 INJECTION, EMULSION INTRAVENOUS at 09:05

## 2021-05-26 RX ADMIN — Medication 200 MCG/KG/MIN: at 09:05

## 2021-05-26 RX ADMIN — PROPOFOL 30 MG: 10 INJECTION, EMULSION INTRAVENOUS at 09:05

## 2021-05-26 RX ADMIN — PROPOFOL 70 MG: 10 INJECTION, EMULSION INTRAVENOUS at 09:05

## 2021-05-26 RX ADMIN — SODIUM CHLORIDE: 0.9 INJECTION, SOLUTION INTRAVENOUS at 09:05

## 2021-05-26 RX ADMIN — LIDOCAINE HYDROCHLORIDE 100 MG: 20 INJECTION, SOLUTION INTRAVENOUS at 09:05

## 2021-06-07 LAB
FINAL PATHOLOGIC DIAGNOSIS: NORMAL
GROSS: NORMAL
Lab: NORMAL

## 2021-06-10 ENCOUNTER — PATIENT MESSAGE (OUTPATIENT)
Dept: GENETICS | Facility: CLINIC | Age: 36
End: 2021-06-10

## 2021-06-16 ENCOUNTER — PATIENT MESSAGE (OUTPATIENT)
Dept: OBSTETRICS AND GYNECOLOGY | Facility: CLINIC | Age: 36
End: 2021-06-16

## 2021-06-16 DIAGNOSIS — Z30.41 SURVEILLANCE FOR BIRTH CONTROL, ORAL CONTRACEPTIVES: ICD-10-CM

## 2021-06-17 RX ORDER — DESOGESTREL AND ETHINYL ESTRADIOL 21-5 (28)
1 KIT ORAL DAILY
Qty: 28 TABLET | Refills: 0 | Status: SHIPPED | OUTPATIENT
Start: 2021-06-17 | End: 2021-06-28 | Stop reason: SDUPTHER

## 2021-06-28 ENCOUNTER — PATIENT MESSAGE (OUTPATIENT)
Dept: OBSTETRICS AND GYNECOLOGY | Facility: CLINIC | Age: 36
End: 2021-06-28

## 2021-06-28 ENCOUNTER — OFFICE VISIT (OUTPATIENT)
Dept: OBSTETRICS AND GYNECOLOGY | Facility: CLINIC | Age: 36
End: 2021-06-28
Attending: OBSTETRICS & GYNECOLOGY
Payer: COMMERCIAL

## 2021-06-28 VITALS
SYSTOLIC BLOOD PRESSURE: 98 MMHG | HEIGHT: 60 IN | BODY MASS INDEX: 29.82 KG/M2 | WEIGHT: 151.88 LBS | DIASTOLIC BLOOD PRESSURE: 66 MMHG

## 2021-06-28 DIAGNOSIS — Z30.41 SURVEILLANCE FOR BIRTH CONTROL, ORAL CONTRACEPTIVES: ICD-10-CM

## 2021-06-28 DIAGNOSIS — Z01.419 ENCOUNTER FOR GYNECOLOGICAL EXAMINATION WITHOUT ABNORMAL FINDING: Primary | ICD-10-CM

## 2021-06-28 PROCEDURE — 99395 PR PREVENTIVE VISIT,EST,18-39: ICD-10-PCS | Mod: S$GLB,,, | Performed by: OBSTETRICS & GYNECOLOGY

## 2021-06-28 PROCEDURE — 3008F PR BODY MASS INDEX (BMI) DOCUMENTED: ICD-10-PCS | Mod: CPTII,S$GLB,, | Performed by: OBSTETRICS & GYNECOLOGY

## 2021-06-28 PROCEDURE — 3008F BODY MASS INDEX DOCD: CPT | Mod: CPTII,S$GLB,, | Performed by: OBSTETRICS & GYNECOLOGY

## 2021-06-28 PROCEDURE — 1126F PR PAIN SEVERITY QUANTIFIED, NO PAIN PRESENT: ICD-10-PCS | Mod: S$GLB,,, | Performed by: OBSTETRICS & GYNECOLOGY

## 2021-06-28 PROCEDURE — 99395 PREV VISIT EST AGE 18-39: CPT | Mod: S$GLB,,, | Performed by: OBSTETRICS & GYNECOLOGY

## 2021-06-28 PROCEDURE — 1126F AMNT PAIN NOTED NONE PRSNT: CPT | Mod: S$GLB,,, | Performed by: OBSTETRICS & GYNECOLOGY

## 2021-06-28 RX ORDER — DESOGESTREL AND ETHINYL ESTRADIOL 21-5 (28)
1 KIT ORAL DAILY
Qty: 28 TABLET | Refills: 12 | Status: SHIPPED | OUTPATIENT
Start: 2021-06-28 | End: 2021-07-13

## 2021-07-09 ENCOUNTER — PATIENT MESSAGE (OUTPATIENT)
Dept: OBSTETRICS AND GYNECOLOGY | Facility: CLINIC | Age: 36
End: 2021-07-09

## 2021-07-21 ENCOUNTER — PATIENT MESSAGE (OUTPATIENT)
Dept: INTERNAL MEDICINE | Facility: CLINIC | Age: 36
End: 2021-07-21

## 2021-08-09 ENCOUNTER — PATIENT MESSAGE (OUTPATIENT)
Dept: INTERNAL MEDICINE | Facility: CLINIC | Age: 36
End: 2021-08-09

## 2021-08-18 ENCOUNTER — LAB VISIT (OUTPATIENT)
Dept: LAB | Facility: HOSPITAL | Age: 36
End: 2021-08-18
Attending: INTERNAL MEDICINE
Payer: COMMERCIAL

## 2021-08-18 DIAGNOSIS — Z00.00 ANNUAL PHYSICAL EXAM: ICD-10-CM

## 2021-08-18 LAB
ALBUMIN SERPL BCP-MCNC: 3.5 G/DL (ref 3.5–5.2)
ALP SERPL-CCNC: 60 U/L (ref 55–135)
ALT SERPL W/O P-5'-P-CCNC: 20 U/L (ref 10–44)
ANION GAP SERPL CALC-SCNC: 11 MMOL/L (ref 8–16)
AST SERPL-CCNC: 17 U/L (ref 10–40)
BASOPHILS # BLD AUTO: 0.11 K/UL (ref 0–0.2)
BASOPHILS NFR BLD: 1.2 % (ref 0–1.9)
BILIRUB SERPL-MCNC: 0.5 MG/DL (ref 0.1–1)
BUN SERPL-MCNC: 9 MG/DL (ref 6–20)
CALCIUM SERPL-MCNC: 9 MG/DL (ref 8.7–10.5)
CHLORIDE SERPL-SCNC: 108 MMOL/L (ref 95–110)
CHOLEST SERPL-MCNC: 207 MG/DL (ref 120–199)
CHOLEST/HDLC SERPL: 3.4 {RATIO} (ref 2–5)
CO2 SERPL-SCNC: 20 MMOL/L (ref 23–29)
CREAT SERPL-MCNC: 0.7 MG/DL (ref 0.5–1.4)
DIFFERENTIAL METHOD: ABNORMAL
EOSINOPHIL # BLD AUTO: 0.5 K/UL (ref 0–0.5)
EOSINOPHIL NFR BLD: 5.6 % (ref 0–8)
ERYTHROCYTE [DISTWIDTH] IN BLOOD BY AUTOMATED COUNT: 12.8 % (ref 11.5–14.5)
EST. GFR  (AFRICAN AMERICAN): >60 ML/MIN/1.73 M^2
EST. GFR  (NON AFRICAN AMERICAN): >60 ML/MIN/1.73 M^2
GLUCOSE SERPL-MCNC: 74 MG/DL (ref 70–110)
HCT VFR BLD AUTO: 40.7 % (ref 37–48.5)
HDLC SERPL-MCNC: 61 MG/DL (ref 40–75)
HDLC SERPL: 29.5 % (ref 20–50)
HGB BLD-MCNC: 12.9 G/DL (ref 12–16)
IMM GRANULOCYTES # BLD AUTO: 0.02 K/UL (ref 0–0.04)
IMM GRANULOCYTES NFR BLD AUTO: 0.2 % (ref 0–0.5)
LDLC SERPL CALC-MCNC: 117.4 MG/DL (ref 63–159)
LYMPHOCYTES # BLD AUTO: 4.1 K/UL (ref 1–4.8)
LYMPHOCYTES NFR BLD: 44.6 % (ref 18–48)
MCH RBC QN AUTO: 30.1 PG (ref 27–31)
MCHC RBC AUTO-ENTMCNC: 31.7 G/DL (ref 32–36)
MCV RBC AUTO: 95 FL (ref 82–98)
MONOCYTES # BLD AUTO: 0.8 K/UL (ref 0.3–1)
MONOCYTES NFR BLD: 9 % (ref 4–15)
NEUTROPHILS # BLD AUTO: 3.6 K/UL (ref 1.8–7.7)
NEUTROPHILS NFR BLD: 39.4 % (ref 38–73)
NONHDLC SERPL-MCNC: 146 MG/DL
NRBC BLD-RTO: 0 /100 WBC
PLATELET # BLD AUTO: 296 K/UL (ref 150–450)
PMV BLD AUTO: 9.5 FL (ref 9.2–12.9)
POTASSIUM SERPL-SCNC: 4 MMOL/L (ref 3.5–5.1)
PROT SERPL-MCNC: 6.7 G/DL (ref 6–8.4)
RBC # BLD AUTO: 4.28 M/UL (ref 4–5.4)
SODIUM SERPL-SCNC: 139 MMOL/L (ref 136–145)
T4 FREE SERPL-MCNC: 0.74 NG/DL (ref 0.71–1.51)
TRIGL SERPL-MCNC: 143 MG/DL (ref 30–150)
TSH SERPL DL<=0.005 MIU/L-ACNC: 4.6 UIU/ML (ref 0.4–4)
WBC # BLD AUTO: 9.22 K/UL (ref 3.9–12.7)

## 2021-08-18 PROCEDURE — 36415 COLL VENOUS BLD VENIPUNCTURE: CPT | Mod: PO | Performed by: INTERNAL MEDICINE

## 2021-08-18 PROCEDURE — 80061 LIPID PANEL: CPT | Performed by: INTERNAL MEDICINE

## 2021-08-18 PROCEDURE — 85025 COMPLETE CBC W/AUTO DIFF WBC: CPT | Performed by: INTERNAL MEDICINE

## 2021-08-18 PROCEDURE — 84439 ASSAY OF FREE THYROXINE: CPT | Performed by: INTERNAL MEDICINE

## 2021-08-18 PROCEDURE — 84443 ASSAY THYROID STIM HORMONE: CPT | Performed by: INTERNAL MEDICINE

## 2021-08-18 PROCEDURE — 80053 COMPREHEN METABOLIC PANEL: CPT | Performed by: INTERNAL MEDICINE

## 2021-08-23 ENCOUNTER — PATIENT MESSAGE (OUTPATIENT)
Dept: INTERNAL MEDICINE | Facility: CLINIC | Age: 36
End: 2021-08-23

## 2021-08-23 DIAGNOSIS — E78.2 MIXED HYPERLIPIDEMIA: ICD-10-CM

## 2021-08-23 RX ORDER — ROSUVASTATIN CALCIUM 10 MG/1
10 TABLET, COATED ORAL DAILY
Qty: 30 TABLET | Refills: 6 | Status: SHIPPED | OUTPATIENT
Start: 2021-08-23 | End: 2022-03-19 | Stop reason: SDUPTHER

## 2021-09-27 ENCOUNTER — PATIENT MESSAGE (OUTPATIENT)
Dept: INTERNAL MEDICINE | Facility: CLINIC | Age: 36
End: 2021-09-27

## 2021-10-04 ENCOUNTER — PATIENT MESSAGE (OUTPATIENT)
Dept: ADMINISTRATIVE | Facility: HOSPITAL | Age: 36
End: 2021-10-04

## 2021-10-06 ENCOUNTER — OFFICE VISIT (OUTPATIENT)
Dept: INTERNAL MEDICINE | Facility: CLINIC | Age: 36
End: 2021-10-06
Payer: COMMERCIAL

## 2021-10-06 ENCOUNTER — LAB VISIT (OUTPATIENT)
Dept: LAB | Facility: HOSPITAL | Age: 36
End: 2021-10-06
Attending: NURSE PRACTITIONER
Payer: COMMERCIAL

## 2021-10-06 VITALS
DIASTOLIC BLOOD PRESSURE: 70 MMHG | TEMPERATURE: 98 F | HEIGHT: 60 IN | HEART RATE: 83 BPM | BODY MASS INDEX: 30.73 KG/M2 | WEIGHT: 156.5 LBS | OXYGEN SATURATION: 97 % | SYSTOLIC BLOOD PRESSURE: 100 MMHG

## 2021-10-06 DIAGNOSIS — R11.0 NAUSEA: ICD-10-CM

## 2021-10-06 DIAGNOSIS — R89.9 ABNORMAL LABORATORY TEST: ICD-10-CM

## 2021-10-06 DIAGNOSIS — Z71.2 ENCOUNTER TO DISCUSS TEST RESULTS: Primary | ICD-10-CM

## 2021-10-06 DIAGNOSIS — R63.5 WEIGHT GAIN: ICD-10-CM

## 2021-10-06 LAB — TSH SERPL DL<=0.005 MIU/L-ACNC: 1.2 UIU/ML (ref 0.4–4)

## 2021-10-06 PROCEDURE — 1159F PR MEDICATION LIST DOCUMENTED IN MEDICAL RECORD: ICD-10-PCS | Mod: CPTII,S$GLB,, | Performed by: NURSE PRACTITIONER

## 2021-10-06 PROCEDURE — 99214 PR OFFICE/OUTPT VISIT, EST, LEVL IV, 30-39 MIN: ICD-10-PCS | Mod: S$GLB,,, | Performed by: NURSE PRACTITIONER

## 2021-10-06 PROCEDURE — 36415 COLL VENOUS BLD VENIPUNCTURE: CPT | Mod: PO | Performed by: NURSE PRACTITIONER

## 2021-10-06 PROCEDURE — 3008F PR BODY MASS INDEX (BMI) DOCUMENTED: ICD-10-PCS | Mod: CPTII,S$GLB,, | Performed by: NURSE PRACTITIONER

## 2021-10-06 PROCEDURE — 3074F PR MOST RECENT SYSTOLIC BLOOD PRESSURE < 130 MM HG: ICD-10-PCS | Mod: CPTII,S$GLB,, | Performed by: NURSE PRACTITIONER

## 2021-10-06 PROCEDURE — 3074F SYST BP LT 130 MM HG: CPT | Mod: CPTII,S$GLB,, | Performed by: NURSE PRACTITIONER

## 2021-10-06 PROCEDURE — 1159F MED LIST DOCD IN RCRD: CPT | Mod: CPTII,S$GLB,, | Performed by: NURSE PRACTITIONER

## 2021-10-06 PROCEDURE — 99999 PR PBB SHADOW E&M-EST. PATIENT-LVL IV: CPT | Mod: PBBFAC,,, | Performed by: NURSE PRACTITIONER

## 2021-10-06 PROCEDURE — 99214 OFFICE O/P EST MOD 30 MIN: CPT | Mod: S$GLB,,, | Performed by: NURSE PRACTITIONER

## 2021-10-06 PROCEDURE — 3008F BODY MASS INDEX DOCD: CPT | Mod: CPTII,S$GLB,, | Performed by: NURSE PRACTITIONER

## 2021-10-06 PROCEDURE — 3078F PR MOST RECENT DIASTOLIC BLOOD PRESSURE < 80 MM HG: ICD-10-PCS | Mod: CPTII,S$GLB,, | Performed by: NURSE PRACTITIONER

## 2021-10-06 PROCEDURE — 84443 ASSAY THYROID STIM HORMONE: CPT | Performed by: NURSE PRACTITIONER

## 2021-10-06 PROCEDURE — 99999 PR PBB SHADOW E&M-EST. PATIENT-LVL IV: ICD-10-PCS | Mod: PBBFAC,,, | Performed by: NURSE PRACTITIONER

## 2021-10-06 PROCEDURE — 3078F DIAST BP <80 MM HG: CPT | Mod: CPTII,S$GLB,, | Performed by: NURSE PRACTITIONER

## 2021-10-06 RX ORDER — ONDANSETRON 4 MG/1
4 TABLET, FILM COATED ORAL EVERY 8 HOURS PRN
Qty: 30 TABLET | Refills: 0 | Status: SHIPPED | OUTPATIENT
Start: 2021-10-06 | End: 2023-01-26 | Stop reason: SDUPTHER

## 2021-12-01 ENCOUNTER — OFFICE VISIT (OUTPATIENT)
Dept: URGENT CARE | Facility: CLINIC | Age: 36
End: 2021-12-01
Payer: COMMERCIAL

## 2021-12-01 VITALS
OXYGEN SATURATION: 98 % | WEIGHT: 156 LBS | HEART RATE: 85 BPM | DIASTOLIC BLOOD PRESSURE: 76 MMHG | BODY MASS INDEX: 30.47 KG/M2 | TEMPERATURE: 98 F | SYSTOLIC BLOOD PRESSURE: 113 MMHG

## 2021-12-01 DIAGNOSIS — R52 PAIN: Primary | ICD-10-CM

## 2021-12-01 DIAGNOSIS — Q79.60 EHLERS-DANLOS SYNDROME: ICD-10-CM

## 2021-12-01 DIAGNOSIS — M25.572 ACUTE LEFT ANKLE PAIN: ICD-10-CM

## 2021-12-01 PROCEDURE — 99203 PR OFFICE/OUTPT VISIT, NEW, LEVL III, 30-44 MIN: ICD-10-PCS | Mod: S$GLB,,, | Performed by: CLINIC/CENTER

## 2021-12-01 PROCEDURE — 99203 OFFICE O/P NEW LOW 30 MIN: CPT | Mod: S$GLB,,, | Performed by: CLINIC/CENTER

## 2021-12-01 PROCEDURE — 73610 X-RAY EXAM OF ANKLE: CPT | Mod: LT,S$GLB,, | Performed by: RADIOLOGY

## 2021-12-01 PROCEDURE — 73610 XR ANKLE COMPLETE 3 VIEW LEFT: ICD-10-PCS | Mod: LT,S$GLB,, | Performed by: RADIOLOGY

## 2021-12-20 RX ORDER — ESCITALOPRAM OXALATE 10 MG/1
TABLET ORAL
Qty: 90 TABLET | Refills: 3 | Status: SHIPPED | OUTPATIENT
Start: 2021-12-20 | End: 2023-01-03 | Stop reason: SDUPTHER

## 2022-01-17 RX ORDER — ESOMEPRAZOLE MAGNESIUM 40 MG/1
CAPSULE, DELAYED RELEASE ORAL
Qty: 30 CAPSULE | Refills: 11 | Status: SHIPPED | OUTPATIENT
Start: 2022-01-17 | End: 2022-12-05 | Stop reason: SDUPTHER

## 2022-01-17 NOTE — TELEPHONE ENCOUNTER
No new care gaps identified.  Powered by [employer]. Reference number: [id number].   1/17/2022 12:09:11 AM CST

## 2022-09-11 ENCOUNTER — PATIENT MESSAGE (OUTPATIENT)
Dept: INTERNAL MEDICINE | Facility: CLINIC | Age: 37
End: 2022-09-11
Payer: COMMERCIAL

## 2022-09-12 RX ORDER — AMOXICILLIN AND CLAVULANATE POTASSIUM 875; 125 MG/1; MG/1
1 TABLET, FILM COATED ORAL 2 TIMES DAILY
Qty: 14 TABLET | Refills: 0 | Status: SHIPPED | OUTPATIENT
Start: 2022-09-12 | End: 2022-09-19

## 2022-09-12 RX ORDER — BENZONATATE 100 MG/1
100 CAPSULE ORAL 3 TIMES DAILY PRN
Qty: 90 CAPSULE | Refills: 1 | Status: SHIPPED | OUTPATIENT
Start: 2022-09-12 | End: 2022-09-22

## 2022-09-12 NOTE — TELEPHONE ENCOUNTER
PT states that he tested positive for COVID for the first time on Saturday morning    PT states that he is experiencing a horrible sore throat (I can barely swallow), and  he is coughing up yellow phlegm about every 5 minutes.     Pt would like to know if there is there anything that can be prescribed to help with these symptoms    Pt has been taking OTC meds as well     PT states that he did get his  flu shot on Wednesday     PT states that the symptms started Thursday

## 2022-11-17 ENCOUNTER — PATIENT MESSAGE (OUTPATIENT)
Dept: OBSTETRICS AND GYNECOLOGY | Facility: CLINIC | Age: 37
End: 2022-11-17
Payer: COMMERCIAL

## 2022-12-16 ENCOUNTER — PATIENT MESSAGE (OUTPATIENT)
Dept: INTERNAL MEDICINE | Facility: CLINIC | Age: 37
End: 2022-12-16
Payer: COMMERCIAL

## 2022-12-16 ENCOUNTER — PATIENT MESSAGE (OUTPATIENT)
Dept: OBSTETRICS AND GYNECOLOGY | Facility: CLINIC | Age: 37
End: 2022-12-16
Payer: COMMERCIAL

## 2023-01-02 ENCOUNTER — PATIENT MESSAGE (OUTPATIENT)
Dept: INTERNAL MEDICINE | Facility: CLINIC | Age: 38
End: 2023-01-02
Payer: COMMERCIAL

## 2023-01-03 RX ORDER — ESCITALOPRAM OXALATE 10 MG/1
10 TABLET ORAL NIGHTLY
Qty: 90 TABLET | Refills: 3 | Status: SHIPPED | OUTPATIENT
Start: 2023-01-03 | End: 2023-03-13

## 2023-01-03 NOTE — TELEPHONE ENCOUNTER
Pt is requesting a refill for Lexapro     Lov - 10/6/2021 with Kirill Alexis - 12/20/2021    Rtc - no upcoming appts.

## 2023-01-03 NOTE — TELEPHONE ENCOUNTER
No new care gaps identified.  [facility] Embedded Care Gaps. Reference number: [id number]. 1/03/2023   9:29:56 AM CST

## 2023-01-09 ENCOUNTER — TELEPHONE (OUTPATIENT)
Dept: INTERNAL MEDICINE | Facility: CLINIC | Age: 38
End: 2023-01-09
Payer: COMMERCIAL

## 2023-01-09 ENCOUNTER — PATIENT MESSAGE (OUTPATIENT)
Dept: INTERNAL MEDICINE | Facility: CLINIC | Age: 38
End: 2023-01-09
Payer: COMMERCIAL

## 2023-01-09 NOTE — TELEPHONE ENCOUNTER
Called pt to see if pt can come in for 8:30a on tomorrow's appt. LVM. Also left message on the portal.

## 2023-01-10 ENCOUNTER — PATIENT MESSAGE (OUTPATIENT)
Dept: INTERNAL MEDICINE | Facility: CLINIC | Age: 38
End: 2023-01-10
Payer: COMMERCIAL

## 2023-01-10 DIAGNOSIS — Z00.00 ENCOUNTER FOR ANNUAL HEALTH EXAMINATION: Primary | ICD-10-CM

## 2023-01-11 ENCOUNTER — PATIENT MESSAGE (OUTPATIENT)
Dept: INTERNAL MEDICINE | Facility: CLINIC | Age: 38
End: 2023-01-11
Payer: COMMERCIAL

## 2023-01-18 ENCOUNTER — PATIENT MESSAGE (OUTPATIENT)
Dept: INTERNAL MEDICINE | Facility: CLINIC | Age: 38
End: 2023-01-18
Payer: COMMERCIAL

## 2023-01-20 ENCOUNTER — PATIENT MESSAGE (OUTPATIENT)
Dept: INTERNAL MEDICINE | Facility: CLINIC | Age: 38
End: 2023-01-20
Payer: COMMERCIAL

## 2023-01-22 ENCOUNTER — PATIENT MESSAGE (OUTPATIENT)
Dept: INTERNAL MEDICINE | Facility: CLINIC | Age: 38
End: 2023-01-22
Payer: COMMERCIAL

## 2023-01-24 ENCOUNTER — LAB VISIT (OUTPATIENT)
Dept: LAB | Facility: HOSPITAL | Age: 38
End: 2023-01-24
Payer: COMMERCIAL

## 2023-01-24 DIAGNOSIS — Z00.00 ENCOUNTER FOR ANNUAL HEALTH EXAMINATION: ICD-10-CM

## 2023-01-24 LAB
ALBUMIN SERPL BCP-MCNC: 3.5 G/DL (ref 3.5–5.2)
ALP SERPL-CCNC: 67 U/L (ref 55–135)
ALT SERPL W/O P-5'-P-CCNC: 29 U/L (ref 10–44)
ANION GAP SERPL CALC-SCNC: 13 MMOL/L (ref 8–16)
AST SERPL-CCNC: 26 U/L (ref 10–40)
BASOPHILS # BLD AUTO: 0.08 K/UL (ref 0–0.2)
BASOPHILS NFR BLD: 1 % (ref 0–1.9)
BILIRUB SERPL-MCNC: 0.3 MG/DL (ref 0.1–1)
BUN SERPL-MCNC: 13 MG/DL (ref 6–20)
CALCIUM SERPL-MCNC: 9.4 MG/DL (ref 8.7–10.5)
CHLORIDE SERPL-SCNC: 108 MMOL/L (ref 95–110)
CHOLEST SERPL-MCNC: 172 MG/DL (ref 120–199)
CHOLEST/HDLC SERPL: 3.4 {RATIO} (ref 2–5)
CO2 SERPL-SCNC: 18 MMOL/L (ref 23–29)
CREAT SERPL-MCNC: 0.7 MG/DL (ref 0.5–1.4)
DIFFERENTIAL METHOD: ABNORMAL
EOSINOPHIL # BLD AUTO: 0.4 K/UL (ref 0–0.5)
EOSINOPHIL NFR BLD: 4.7 % (ref 0–8)
ERYTHROCYTE [DISTWIDTH] IN BLOOD BY AUTOMATED COUNT: 12.2 % (ref 11.5–14.5)
EST. GFR  (NO RACE VARIABLE): >60 ML/MIN/1.73 M^2
ESTIMATED AVG GLUCOSE: 108 MG/DL (ref 68–131)
GLUCOSE SERPL-MCNC: 97 MG/DL (ref 70–110)
HBA1C MFR BLD: 5.4 % (ref 4–5.6)
HCT VFR BLD AUTO: 39.6 % (ref 37–48.5)
HDLC SERPL-MCNC: 51 MG/DL (ref 40–75)
HDLC SERPL: 29.7 % (ref 20–50)
HGB BLD-MCNC: 12.5 G/DL (ref 12–16)
IMM GRANULOCYTES # BLD AUTO: 0.01 K/UL (ref 0–0.04)
IMM GRANULOCYTES NFR BLD AUTO: 0.1 % (ref 0–0.5)
LDLC SERPL CALC-MCNC: 92.6 MG/DL (ref 63–159)
LYMPHOCYTES # BLD AUTO: 3.6 K/UL (ref 1–4.8)
LYMPHOCYTES NFR BLD: 44.8 % (ref 18–48)
MCH RBC QN AUTO: 29 PG (ref 27–31)
MCHC RBC AUTO-ENTMCNC: 31.6 G/DL (ref 32–36)
MCV RBC AUTO: 92 FL (ref 82–98)
MONOCYTES # BLD AUTO: 0.7 K/UL (ref 0.3–1)
MONOCYTES NFR BLD: 8.7 % (ref 4–15)
NEUTROPHILS # BLD AUTO: 3.3 K/UL (ref 1.8–7.7)
NEUTROPHILS NFR BLD: 40.7 % (ref 38–73)
NONHDLC SERPL-MCNC: 121 MG/DL
NRBC BLD-RTO: 0 /100 WBC
PLATELET # BLD AUTO: 324 K/UL (ref 150–450)
PMV BLD AUTO: 9.2 FL (ref 9.2–12.9)
POTASSIUM SERPL-SCNC: 3.8 MMOL/L (ref 3.5–5.1)
PROT SERPL-MCNC: 6.8 G/DL (ref 6–8.4)
RBC # BLD AUTO: 4.31 M/UL (ref 4–5.4)
SODIUM SERPL-SCNC: 139 MMOL/L (ref 136–145)
TRIGL SERPL-MCNC: 142 MG/DL (ref 30–150)
TSH SERPL DL<=0.005 MIU/L-ACNC: 3.17 UIU/ML (ref 0.4–4)
WBC # BLD AUTO: 8.08 K/UL (ref 3.9–12.7)

## 2023-01-24 PROCEDURE — 80053 COMPREHEN METABOLIC PANEL: CPT | Performed by: NURSE PRACTITIONER

## 2023-01-24 PROCEDURE — 80061 LIPID PANEL: CPT | Performed by: NURSE PRACTITIONER

## 2023-01-24 PROCEDURE — 36415 COLL VENOUS BLD VENIPUNCTURE: CPT | Mod: PO | Performed by: NURSE PRACTITIONER

## 2023-01-24 PROCEDURE — 84443 ASSAY THYROID STIM HORMONE: CPT | Performed by: NURSE PRACTITIONER

## 2023-01-24 PROCEDURE — 85025 COMPLETE CBC W/AUTO DIFF WBC: CPT | Performed by: NURSE PRACTITIONER

## 2023-01-24 PROCEDURE — 83036 HEMOGLOBIN GLYCOSYLATED A1C: CPT | Performed by: NURSE PRACTITIONER

## 2023-01-26 ENCOUNTER — OFFICE VISIT (OUTPATIENT)
Dept: INTERNAL MEDICINE | Facility: CLINIC | Age: 38
End: 2023-01-26
Payer: COMMERCIAL

## 2023-01-26 VITALS
RESPIRATION RATE: 16 BRPM | BODY MASS INDEX: 32.81 KG/M2 | WEIGHT: 167.13 LBS | DIASTOLIC BLOOD PRESSURE: 62 MMHG | HEART RATE: 80 BPM | TEMPERATURE: 97 F | HEIGHT: 60 IN | OXYGEN SATURATION: 96 % | SYSTOLIC BLOOD PRESSURE: 122 MMHG

## 2023-01-26 DIAGNOSIS — E78.2 MIXED HYPERLIPIDEMIA: ICD-10-CM

## 2023-01-26 DIAGNOSIS — R11.0 NAUSEA: ICD-10-CM

## 2023-01-26 DIAGNOSIS — Z00.00 ENCOUNTER FOR ANNUAL HEALTH EXAMINATION: Primary | ICD-10-CM

## 2023-01-26 DIAGNOSIS — Q79.60 EHLERS-DANLOS SYNDROME: ICD-10-CM

## 2023-01-26 DIAGNOSIS — J30.2 SEASONAL ALLERGIES: ICD-10-CM

## 2023-01-26 DIAGNOSIS — E61.1 IRON DEFICIENCY: ICD-10-CM

## 2023-01-26 PROBLEM — R07.9 CHEST PAIN AT REST: Status: RESOLVED | Noted: 2020-06-28 | Resolved: 2023-01-26

## 2023-01-26 PROBLEM — R13.10 DYSPHAGIA: Status: RESOLVED | Noted: 2019-12-23 | Resolved: 2023-01-26

## 2023-01-26 PROBLEM — M47.817 LUMBOSACRAL SPONDYLOSIS WITHOUT MYELOPATHY: Status: ACTIVE | Noted: 2021-11-24

## 2023-01-26 PROBLEM — M54.16 LUMBAR RADICULOPATHY: Status: ACTIVE | Noted: 2021-09-29

## 2023-01-26 PROBLEM — G89.4 CHRONIC PAIN SYNDROME: Status: ACTIVE | Noted: 2021-09-29

## 2023-01-26 PROBLEM — M79.18 MYOFASCIAL PAIN SYNDROME: Status: ACTIVE | Noted: 2021-09-29

## 2023-01-26 PROCEDURE — 99999 PR PBB SHADOW E&M-EST. PATIENT-LVL V: CPT | Mod: PBBFAC,,, | Performed by: NURSE PRACTITIONER

## 2023-01-26 PROCEDURE — 99999 PR PBB SHADOW E&M-EST. PATIENT-LVL V: ICD-10-PCS | Mod: PBBFAC,,, | Performed by: NURSE PRACTITIONER

## 2023-01-26 PROCEDURE — 1159F MED LIST DOCD IN RCRD: CPT | Mod: CPTII,S$GLB,, | Performed by: NURSE PRACTITIONER

## 2023-01-26 PROCEDURE — 3044F HG A1C LEVEL LT 7.0%: CPT | Mod: CPTII,S$GLB,, | Performed by: NURSE PRACTITIONER

## 2023-01-26 PROCEDURE — 3078F PR MOST RECENT DIASTOLIC BLOOD PRESSURE < 80 MM HG: ICD-10-PCS | Mod: CPTII,S$GLB,, | Performed by: NURSE PRACTITIONER

## 2023-01-26 PROCEDURE — 1160F RVW MEDS BY RX/DR IN RCRD: CPT | Mod: CPTII,S$GLB,, | Performed by: NURSE PRACTITIONER

## 2023-01-26 PROCEDURE — 99395 PR PREVENTIVE VISIT,EST,18-39: ICD-10-PCS | Mod: S$GLB,,, | Performed by: NURSE PRACTITIONER

## 2023-01-26 PROCEDURE — 3078F DIAST BP <80 MM HG: CPT | Mod: CPTII,S$GLB,, | Performed by: NURSE PRACTITIONER

## 2023-01-26 PROCEDURE — 3074F SYST BP LT 130 MM HG: CPT | Mod: CPTII,S$GLB,, | Performed by: NURSE PRACTITIONER

## 2023-01-26 PROCEDURE — 3008F PR BODY MASS INDEX (BMI) DOCUMENTED: ICD-10-PCS | Mod: CPTII,S$GLB,, | Performed by: NURSE PRACTITIONER

## 2023-01-26 PROCEDURE — 1160F PR REVIEW ALL MEDS BY PRESCRIBER/CLIN PHARMACIST DOCUMENTED: ICD-10-PCS | Mod: CPTII,S$GLB,, | Performed by: NURSE PRACTITIONER

## 2023-01-26 PROCEDURE — 3074F PR MOST RECENT SYSTOLIC BLOOD PRESSURE < 130 MM HG: ICD-10-PCS | Mod: CPTII,S$GLB,, | Performed by: NURSE PRACTITIONER

## 2023-01-26 PROCEDURE — 3008F BODY MASS INDEX DOCD: CPT | Mod: CPTII,S$GLB,, | Performed by: NURSE PRACTITIONER

## 2023-01-26 PROCEDURE — 1159F PR MEDICATION LIST DOCUMENTED IN MEDICAL RECORD: ICD-10-PCS | Mod: CPTII,S$GLB,, | Performed by: NURSE PRACTITIONER

## 2023-01-26 PROCEDURE — 99395 PREV VISIT EST AGE 18-39: CPT | Mod: S$GLB,,, | Performed by: NURSE PRACTITIONER

## 2023-01-26 PROCEDURE — 3044F PR MOST RECENT HEMOGLOBIN A1C LEVEL <7.0%: ICD-10-PCS | Mod: CPTII,S$GLB,, | Performed by: NURSE PRACTITIONER

## 2023-01-26 RX ORDER — RIMEGEPANT SULFATE 75 MG/75MG
TABLET, ORALLY DISINTEGRATING ORAL
COMMUNITY

## 2023-01-26 RX ORDER — DICLOFENAC SODIUM 10 MG/G
GEL TOPICAL
COMMUNITY

## 2023-01-26 RX ORDER — BUPRENORPHINE HYDROCHLORIDE 300 UG/1
300 FILM, SOLUBLE BUCCAL 2 TIMES DAILY
COMMUNITY
End: 2023-06-12

## 2023-01-26 RX ORDER — ROSUVASTATIN CALCIUM 10 MG/1
10 TABLET, COATED ORAL DAILY
Qty: 90 TABLET | Refills: 3 | Status: SHIPPED | OUTPATIENT
Start: 2023-01-26

## 2023-01-26 RX ORDER — CETIRIZINE HYDROCHLORIDE 10 MG/1
10 TABLET ORAL DAILY
Qty: 90 TABLET | Refills: 3 | Status: SHIPPED | OUTPATIENT
Start: 2023-01-26 | End: 2023-07-31 | Stop reason: SDUPTHER

## 2023-01-26 RX ORDER — METAXALONE 800 MG/1
TABLET ORAL NIGHTLY
COMMUNITY
Start: 2022-08-01

## 2023-01-26 RX ORDER — MONTELUKAST SODIUM 10 MG/1
10 TABLET ORAL NIGHTLY
Qty: 30 TABLET | Refills: 0 | Status: SHIPPED | OUTPATIENT
Start: 2023-01-26 | End: 2023-02-25

## 2023-01-26 RX ORDER — CETIRIZINE HYDROCHLORIDE 10 MG/1
TABLET ORAL DAILY
COMMUNITY
End: 2023-01-26 | Stop reason: SDUPTHER

## 2023-01-26 RX ORDER — ONDANSETRON 4 MG/1
4 TABLET, FILM COATED ORAL EVERY 8 HOURS PRN
Qty: 30 TABLET | Refills: 0 | Status: SHIPPED | OUTPATIENT
Start: 2023-01-26

## 2023-01-26 RX ORDER — FLUTICASONE PROPIONATE 50 MCG
1 SPRAY, SUSPENSION (ML) NASAL 2 TIMES DAILY
COMMUNITY
Start: 2023-01-24

## 2023-01-26 NOTE — PROGRESS NOTES
"Subjective:       Patient ID: Luz Muñiz is a 37 y.o. female.    Chief Complaint: Annual Wellness Visit      Luz Muñiz is a 37 y.o. female who presents today for an annual wellness visit.     She voices no concerns or complaints.     Review of patient's allergies indicates:   Allergen Reactions    Sulfa (sulfonamide antibiotics)     Codeine Rash    Sulfamethoxazole-trimethoprim Nausea And Vomiting and Other (See Comments)      Medication List with Changes/Refills   New Medications    CETIRIZINE (ZYRTEC) 10 MG TABLET    Take 1 tablet (10 mg total) by mouth once daily.   Current Medications    AZELASTINE (ASTELIN) 137 MCG (0.1 %) NASAL SPRAY    1 spray by Nasal route 2 (two) times daily.    B COMPLEX VITAMINS TABLET    Take 1 tablet by mouth once daily.    BD ALLERGIST TRAY REG BEVEL 1 ML 27 X 1/2" SYRG    USE ONCE WEEKLY AS DIRECTED    BUPRENORPHINE HCL (BELBUCA) 300 MCG FILM    Place 300 mcg inside cheek 2 (two) times daily.    CALCIUM ORAL    Take by mouth.    DICLOFENAC SODIUM (VOLTAREN) 1 % GEL    diclofenac 1 % topical gel    ESCITALOPRAM OXALATE (LEXAPRO) 10 MG TABLET    Take 1 tablet (10 mg total) by mouth every evening.    ESOMEPRAZOLE (NEXIUM) 40 MG CAPSULE    TAKE 1 CAPSULE BY MOUTH BEFORE BREAKFAST    ETODOLAC (LODINE) 400 MG TABLET    Take 1 tablet (400 mg total) by mouth every 6 (six) hours as needed.    FERROUS SULFATE (FEOSOL) 325 MG (65 MG IRON) TAB TABLET    Take 1 tablet (325 mg total) by mouth every 12 (twelve) hours.    FLUTICASONE PROPIONATE (FLONASE) 50 MCG/ACTUATION NASAL SPRAY    1 spray by Each Nostril route 2 (two) times daily.    HYDROCODONE-ACETAMINOPHEN (NORCO)  MG PER TABLET    TAKE ONE TABLET BY MOUTH EVERY 4 6 HOURS AS NEEDED FOR PAIN    HYOSCYAMINE (LEVSIN/SL) 0.125 MG SUBL    PLEASE SEE ATTACHED FOR DETAILED DIRECTIONS    METAXALONE (SKELAXIN) 800 MG TABLET    nightly.    RIMEGEPANT (NURTEC) 75 MG ODT    Nurtec ODT 75 mg disintegrating tablet    VIORELE, 28, " 0.15-0.02 MGX21 /0.01 MG X 5 PER TABLET    TAKE 1 TABLET BY MOUTH EVERY DAY   Changed and/or Refilled Medications    Modified Medication Previous Medication    MONTELUKAST (SINGULAIR) 10 MG TABLET cetirizine (ZYRTEC) 10 MG tablet       Take 1 tablet (10 mg total) by mouth every evening.    once daily.    ONDANSETRON (ZOFRAN) 4 MG TABLET ondansetron (ZOFRAN) 4 MG tablet       Take 1 tablet (4 mg total) by mouth every 8 (eight) hours as needed for Nausea.    Take 1 tablet (4 mg total) by mouth every 8 (eight) hours as needed for Nausea.    ROSUVASTATIN (CRESTOR) 10 MG TABLET rosuvastatin (CRESTOR) 10 MG tablet       Take 1 tablet (10 mg total) by mouth once daily.    TAKE 1 TABLET BY MOUTH EVERY DAY   Discontinued Medications    COENZYME Q10 200 MG CAPSULE    Take 200 mg by mouth once daily.    LAMOTRIGINE (LAMICTAL) 25 MG TABLET    Take 50 mg by mouth 2 (two) times daily.    NIACIN 100 MG TAB    Take 100 mg by mouth every evening.    OMEGA-3 FATTY ACIDS/FISH OIL (FISH OIL-OMEGA-3 FATTY ACIDS) 300-1,000 MG CAPSULE    Take 1 capsule by mouth once daily.    SODIUM,POTASSIUM,MAG SULFATES (SUPREP BOWEL PREP KIT) 17.5-3.13-1.6 GRAM SOLR    Take 354 mLs by mouth as needed.    TRAMADOL (ULTRAM) 50 MG TABLET    TAKE 1 TABLET BY MOUTH EVERY 4 HOURS AS NEEDED PAIN    UNABLE TO FIND    Physical Therapy - evaluate and treat this patient with EDS  [facility] in [place] -[phone]       Health Maintenance    PAP: 05/08/2019  Tetanus/Tdap: 02/22/2017  Hepatitis C Screen: 02/16/2016  HIV Screen: 02/16/2016      Patient ambulates on her own without an assistive device.     On average, how many days per week do you do moderate to strenuous exercise:  (like a brisk walk or jog; this does not include your job/work)  0     On average, how many minutes do you exercise at this level each day? 0    Do you eat fruits and vegetable every day?  Yes    Have you ever used tobacco products? No    Have you ever been screened for HIV?  Yes  Would you like to be screened for HIV? No    How often do you drink alcohol?  Very rarely    Review of Systems   Constitutional:  Negative for chills and fever.   Respiratory:  Negative for cough and shortness of breath.    Cardiovascular:  Negative for chest pain.   Neurological:  Negative for dizziness and headaches.    Review of Systems   Constitutional:  Negative for chills and fever.   Respiratory:  Negative for cough and shortness of breath.    Cardiovascular:  Negative for chest pain.   Neurological:  Negative for dizziness and headaches.     Objective:     /62 (BP Location: Right arm, Patient Position: Sitting, BP Method: Medium (Manual))   Pulse 80   Temp 96.7 °F (35.9 °C) (Skin)   Resp 16   Ht 5' (1.524 m)   Wt 75.8 kg (167 lb 1.7 oz)   LMP 01/12/2023 (Approximate) Comment: irregular due to BCP  SpO2 96%   BMI 32.64 kg/m²     Physical Exam  Vitals reviewed.   Constitutional:       Appearance: Normal appearance.   HENT:      Head: Normocephalic and atraumatic.   Cardiovascular:      Rate and Rhythm: Normal rate and regular rhythm.      Heart sounds: Normal heart sounds. No murmur heard.  Pulmonary:      Effort: Pulmonary effort is normal.      Breath sounds: Normal breath sounds. No wheezing.   Skin:     General: Skin is warm and dry.   Neurological:      Mental Status: She is alert and oriented to person, place, and time.     BP Readings from Last 3 Encounters:   01/26/23 122/62   12/01/21 113/76   10/06/21 100/70     Wt Readings from Last 3 Encounters:   01/26/23 75.8 kg (167 lb 1.7 oz)   12/01/21 70.8 kg (156 lb)   10/06/21 71 kg (156 lb 8.4 oz)       Last Labs:  Glucose   Date Value Ref Range Status   01/24/2023 97 70 - 110 mg/dL Final   08/18/2021 74 70 - 110 mg/dL Final     BUN   Date Value Ref Range Status   01/24/2023 13 6 - 20 mg/dL Final   08/18/2021 9 6 - 20 mg/dL Final     Creatinine   Date Value Ref Range Status   01/24/2023 0.7 0.5 - 1.4 mg/dL Final   08/18/2021 0.7 0.5 -  1.4 mg/dL Final     Cholesterol   Date Value Ref Range Status   01/24/2023 172 120 - 199 mg/dL Final     Comment:     The National Cholesterol Education Program (NCEP) has set the  following guidelines (reference ranges) for Cholesterol:  Optimal.....................<200 mg/dL  Borderline High.............200-239 mg/dL  High........................> or = 240 mg/dL     08/18/2021 207 (H) 120 - 199 mg/dL Final     Comment:     The National Cholesterol Education Program (NCEP) has set the  following guidelines (reference ranges) for Cholesterol:  Optimal.....................<200 mg/dL  Borderline High.............200-239 mg/dL  High........................> or = 240 mg/dL       Hemoglobin A1C   Date Value Ref Range Status   01/24/2023 5.4 4.0 - 5.6 % Final     Comment:     ADA Screening Guidelines:  5.7-6.4%  Consistent with prediabetes  >or=6.5%  Consistent with diabetes    High levels of fetal hemoglobin interfere with the HbA1C  assay. Heterozygous hemoglobin variants (HbS, HgC, etc)do  not significantly interfere with this assay.   However, presence of multiple variants may affect accuracy.       Hemoglobin   Date Value Ref Range Status   01/24/2023 12.5 12.0 - 16.0 g/dL Final   08/18/2021 12.9 12.0 - 16.0 g/dL Final     Hematocrit   Date Value Ref Range Status   01/24/2023 39.6 37.0 - 48.5 % Final   08/18/2021 40.7 37.0 - 48.5 % Final     Vit D, 25-Hydroxy   Date Value Ref Range Status   05/25/2020 44 30 - 96 ng/mL Final     Comment:     Vitamin D deficiency.........<10 ng/mL                              Vitamin D insufficiency......10-29 ng/mL       Vitamin D sufficiency........> or equal to 30 ng/mL  Vitamin D toxicity............>100 ng/mL     03/08/2019 49 30 - 96 ng/mL Final     Comment:     Vitamin D deficiency.........<10 ng/mL                              Vitamin D insufficiency......10-29 ng/mL       Vitamin D sufficiency........> or equal to 30 ng/mL  Vitamin D toxicity............>100 ng/mL         I  have reviewed the following:     Details / Date    [x]   Labs     []   Micro     []   Pathology     []   Imaging     []   Cardiology Procedures     [x]   Other  Patient's Medical and Surgical History        Assessment and Plan:     1. Encounter for annual health examination    --Immunizations reviewed.  --Discussed benefits of maintaining up to date health maintenance.    2. Nausea    Chronic, Intermittent, Zofran PRN    - ondansetron (ZOFRAN) 4 MG tablet; Take 1 tablet (4 mg total) by mouth every 8 (eight) hours as needed for Nausea.  Dispense: 30 tablet; Refill: 0    3. Mixed hyperlipidemia    Chronic, stable, continue current medication     - rosuvastatin (CRESTOR) 10 MG tablet; Take 1 tablet (10 mg total) by mouth once daily.  Dispense: 90 tablet; Refill: 3    4. Portia-Danlos syndrome    Chronic, stable    5. Iron deficiency    Chronic, stable     6. Seasonal allergies    Chronic, stable, continue current medication     - montelukast (SINGULAIR) 10 mg tablet; Take 1 tablet (10 mg total) by mouth every evening.  Dispense: 30 tablet; Refill: 0  - cetirizine (ZYRTEC) 10 MG tablet; Take 1 tablet (10 mg total) by mouth once daily.  Dispense: 90 tablet; Refill: 3

## 2023-01-31 ENCOUNTER — PATIENT MESSAGE (OUTPATIENT)
Dept: INTERNAL MEDICINE | Facility: CLINIC | Age: 38
End: 2023-01-31
Payer: COMMERCIAL

## 2023-02-06 ENCOUNTER — OFFICE VISIT (OUTPATIENT)
Dept: OBSTETRICS AND GYNECOLOGY | Facility: CLINIC | Age: 38
End: 2023-02-06
Attending: OBSTETRICS & GYNECOLOGY
Payer: COMMERCIAL

## 2023-02-06 VITALS — HEIGHT: 60 IN | WEIGHT: 166.63 LBS | BODY MASS INDEX: 32.71 KG/M2

## 2023-02-06 DIAGNOSIS — Z01.419 ENCOUNTER FOR GYNECOLOGICAL EXAMINATION WITHOUT ABNORMAL FINDING: Primary | ICD-10-CM

## 2023-02-06 DIAGNOSIS — Z12.4 ENCOUNTER FOR PAPANICOLAOU SMEAR FOR CERVICAL CANCER SCREENING: ICD-10-CM

## 2023-02-06 PROCEDURE — 3044F PR MOST RECENT HEMOGLOBIN A1C LEVEL <7.0%: ICD-10-PCS | Mod: CPTII,S$GLB,, | Performed by: OBSTETRICS & GYNECOLOGY

## 2023-02-06 PROCEDURE — 1159F PR MEDICATION LIST DOCUMENTED IN MEDICAL RECORD: ICD-10-PCS | Mod: CPTII,S$GLB,, | Performed by: OBSTETRICS & GYNECOLOGY

## 2023-02-06 PROCEDURE — 88175 CYTOPATH C/V AUTO FLUID REDO: CPT | Performed by: OBSTETRICS & GYNECOLOGY

## 2023-02-06 PROCEDURE — 99395 PREV VISIT EST AGE 18-39: CPT | Mod: S$GLB,,, | Performed by: OBSTETRICS & GYNECOLOGY

## 2023-02-06 PROCEDURE — 1159F MED LIST DOCD IN RCRD: CPT | Mod: CPTII,S$GLB,, | Performed by: OBSTETRICS & GYNECOLOGY

## 2023-02-06 PROCEDURE — 3044F HG A1C LEVEL LT 7.0%: CPT | Mod: CPTII,S$GLB,, | Performed by: OBSTETRICS & GYNECOLOGY

## 2023-02-06 PROCEDURE — 3008F PR BODY MASS INDEX (BMI) DOCUMENTED: ICD-10-PCS | Mod: CPTII,S$GLB,, | Performed by: OBSTETRICS & GYNECOLOGY

## 2023-02-06 PROCEDURE — 99395 PR PREVENTIVE VISIT,EST,18-39: ICD-10-PCS | Mod: S$GLB,,, | Performed by: OBSTETRICS & GYNECOLOGY

## 2023-02-06 PROCEDURE — 99999 PR PBB SHADOW E&M-EST. PATIENT-LVL IV: CPT | Mod: PBBFAC,,, | Performed by: OBSTETRICS & GYNECOLOGY

## 2023-02-06 PROCEDURE — 87624 HPV HI-RISK TYP POOLED RSLT: CPT | Performed by: OBSTETRICS & GYNECOLOGY

## 2023-02-06 PROCEDURE — 99999 PR PBB SHADOW E&M-EST. PATIENT-LVL IV: ICD-10-PCS | Mod: PBBFAC,,, | Performed by: OBSTETRICS & GYNECOLOGY

## 2023-02-06 PROCEDURE — 3008F BODY MASS INDEX DOCD: CPT | Mod: CPTII,S$GLB,, | Performed by: OBSTETRICS & GYNECOLOGY
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
## 2023-02-09 LAB
HPV HR 12 DNA SPEC QL NAA+PROBE: NEGATIVE
HPV16 AG SPEC QL: NEGATIVE
HPV18 DNA SPEC QL NAA+PROBE: NEGATIVE

## 2023-02-10 LAB
FINAL PATHOLOGIC DIAGNOSIS: NORMAL
Lab: NORMAL

## 2023-03-04 ENCOUNTER — PATIENT MESSAGE (OUTPATIENT)
Dept: INTERNAL MEDICINE | Facility: CLINIC | Age: 38
End: 2023-03-04
Payer: COMMERCIAL

## 2023-03-04 DIAGNOSIS — M94.0 COSTOCHONDRITIS: Primary | ICD-10-CM

## 2023-03-13 ENCOUNTER — PATIENT MESSAGE (OUTPATIENT)
Dept: INTERNAL MEDICINE | Facility: CLINIC | Age: 38
End: 2023-03-13
Payer: COMMERCIAL

## 2023-03-13 RX ORDER — ESCITALOPRAM OXALATE 5 MG/1
5 TABLET ORAL DAILY
Qty: 30 TABLET | Refills: 11 | Status: SHIPPED | OUTPATIENT
Start: 2023-03-13 | End: 2023-06-12

## 2023-03-13 RX ORDER — ESCITALOPRAM OXALATE 10 MG/1
10 TABLET ORAL DAILY
Qty: 30 TABLET | Refills: 11 | Status: SHIPPED | OUTPATIENT
Start: 2023-03-13 | End: 2023-06-12 | Stop reason: SDUPTHER

## 2023-03-15 RX ORDER — ESCITALOPRAM OXALATE 10 MG/1
10 TABLET ORAL DAILY
Qty: 30 TABLET | Refills: 11 | Status: CANCELLED | OUTPATIENT
Start: 2023-03-15 | End: 2024-03-14

## 2023-03-16 NOTE — TELEPHONE ENCOUNTER
Spoke with pharmacist at [facility] and she stated the 10mg of Lexapro was placed on hold because it may have been too soon to refill. Pt advised to contact pharmacy

## 2023-03-17 ENCOUNTER — PATIENT MESSAGE (OUTPATIENT)
Dept: OBSTETRICS AND GYNECOLOGY | Facility: CLINIC | Age: 38
End: 2023-03-17
Payer: COMMERCIAL

## 2023-03-17 DIAGNOSIS — Z30.41 SURVEILLANCE FOR BIRTH CONTROL, ORAL CONTRACEPTIVES: ICD-10-CM

## 2023-03-17 RX ORDER — DESOGESTREL AND ETHINYL ESTRADIOL 21-5 (28)
1 KIT ORAL DAILY
Qty: 84 TABLET | Refills: 3 | Status: SHIPPED | OUTPATIENT
Start: 2023-03-17 | End: 2024-02-09

## 2023-03-22 NOTE — PROGRESS NOTES
"Subjective:       Patient ID: Luz Muñiz is a 37 y.o. female.    Chief Complaint: Chest Pain (Mid sternum, and across breast line)      Patient is a 37 y.o. female who traditionally follows with Dorota Prasad DO presenting today for:    Chest Pain  Onset was 2 weeks ago. Symptoms have been unchanged since that time. The patient's pain is constant. The patient describes the pain as  tension/inflammation  and radiates to the shoulders bilaterally . Associated symptoms are: fatigue. Aggravating factors are: coughing, deep inspiration, exercise, and housework. Also notes pain is worse when lying down and getting up from a laying position. Alleviating factors are: none - tried muscle relaxer, NSAIDs and Percocet.       Review of patient's allergies indicates:   Allergen Reactions    Sulfa (sulfonamide antibiotics)     Codeine Rash    Sulfamethoxazole-trimethoprim Nausea And Vomiting and Other (See Comments)       Medication List with Changes/Refills   Current Medications    AZELASTINE (ASTELIN) 137 MCG (0.1 %) NASAL SPRAY    1 spray by Nasal route 2 (two) times daily.    B COMPLEX VITAMINS TABLET    Take 1 tablet by mouth once daily.    BD ALLERGIST TRAY REG BEVEL 1 ML 27 X 1/2" SYRG    USE ONCE WEEKLY AS DIRECTED    BUPRENORPHINE HCL (BELBUCA) 300 MCG FILM    Place 300 mcg inside cheek 2 (two) times daily.    CALCIUM ORAL    Take by mouth.    CETIRIZINE (ZYRTEC) 10 MG TABLET    Take 1 tablet (10 mg total) by mouth once daily.    DESOG-E.ESTRADIOL/E.ESTRADIOL (VIORELE, 28,) 0.15-0.02 MGX21 /0.01 MG X 5 PER TABLET    Take 1 tablet by mouth once daily.    DICLOFENAC SODIUM (VOLTAREN) 1 % GEL    diclofenac 1 % topical gel    ESCITALOPRAM OXALATE (LEXAPRO) 10 MG TABLET    Take 1 tablet (10 mg total) by mouth once daily.    ESCITALOPRAM OXALATE (LEXAPRO) 5 MG TAB    Take 1 tablet (5 mg total) by mouth once daily.    ESOMEPRAZOLE (NEXIUM) 40 MG CAPSULE    TAKE 1 CAPSULE BY MOUTH BEFORE BREAKFAST    ETODOLAC " (LODINE) 400 MG TABLET    Take 1 tablet (400 mg total) by mouth every 6 (six) hours as needed.    FERROUS SULFATE (FEOSOL) 325 MG (65 MG IRON) TAB TABLET    Take 1 tablet (325 mg total) by mouth every 12 (twelve) hours.    FLUTICASONE PROPIONATE (FLONASE) 50 MCG/ACTUATION NASAL SPRAY    1 spray by Each Nostril route 2 (two) times daily.    HYDROCODONE-ACETAMINOPHEN (NORCO)  MG PER TABLET    TAKE ONE TABLET BY MOUTH EVERY 4 6 HOURS AS NEEDED FOR PAIN    HYOSCYAMINE (LEVSIN/SL) 0.125 MG SUBL    PLEASE SEE ATTACHED FOR DETAILED DIRECTIONS    LACTOBAC NO.41/BIFIDOBACT NO.7 (PROBIOTIC-10 ORAL)    Take by mouth once daily.    METAXALONE (SKELAXIN) 800 MG TABLET    nightly.    ONDANSETRON (ZOFRAN) 4 MG TABLET    Take 1 tablet (4 mg total) by mouth every 8 (eight) hours as needed for Nausea.    OXYCODONE-ACETAMINOPHEN (PERCOCET)  MG PER TABLET    Take 1-2 tablets by mouth daily as needed.    PSYLLIUM HUSK (METAMUCIL ORAL)    Take 1 tablet by mouth once daily.    RIMEGEPANT (NURTEC) 75 MG ODT    Nurtec ODT 75 mg disintegrating tablet    ROSUVASTATIN (CRESTOR) 10 MG TABLET    Take 1 tablet (10 mg total) by mouth once daily.     Medical, social and surgical history has been reviewed with the patient.     Review of Systems   Constitutional:  Positive for activity change and unexpected weight change.   HENT:  Negative for hearing loss, rhinorrhea and trouble swallowing.    Eyes:  Positive for discharge. Negative for visual disturbance.   Respiratory:  Positive for chest tightness. Negative for wheezing.    Cardiovascular:  Positive for chest pain. Negative for palpitations.   Gastrointestinal:  Negative for blood in stool, constipation, diarrhea and vomiting.   Endocrine: Negative for polydipsia and polyuria.   Genitourinary:  Negative for difficulty urinating, dysuria, hematuria and menstrual problem.   Musculoskeletal:  Positive for arthralgias, joint swelling and neck pain.   Neurological:  Negative for weakness  and headaches.   Psychiatric/Behavioral:  Negative for confusion and dysphoric mood.         Objective:   /60 (BP Location: Left arm, Patient Position: Sitting, BP Method: Medium (Manual))   Pulse 64   Temp 96 °F (35.6 °C) (Skin)   Resp 16   Ht 5' (1.524 m)   Wt 74.6 kg (164 lb 7.4 oz)   SpO2 98%   BMI 32.12 kg/m²       Physical Exam  Vitals reviewed.   Constitutional:       Appearance: Normal appearance.   HENT:      Head: Normocephalic and atraumatic.   Cardiovascular:      Rate and Rhythm: Normal rate and regular rhythm.      Heart sounds: Normal heart sounds. No murmur heard.  Pulmonary:      Effort: Pulmonary effort is normal.      Breath sounds: Normal breath sounds. No wheezing.   Chest:      Chest wall: No tenderness.   Abdominal:      General: Bowel sounds are normal.      Palpations: Abdomen is soft.      Tenderness: There is no abdominal tenderness.   Skin:     General: Skin is warm and dry.   Neurological:      Mental Status: She is alert and oriented to person, place, and time.       Last Labs:  Glucose   Date Value Ref Range Status   01/24/2023 97 70 - 110 mg/dL Final   08/18/2021 74 70 - 110 mg/dL Final     BUN   Date Value Ref Range Status   01/24/2023 13 6 - 20 mg/dL Final   08/18/2021 9 6 - 20 mg/dL Final     Creatinine   Date Value Ref Range Status   01/24/2023 0.7 0.5 - 1.4 mg/dL Final   08/18/2021 0.7 0.5 - 1.4 mg/dL Final     Cholesterol   Date Value Ref Range Status   01/24/2023 172 120 - 199 mg/dL Final     Comment:     The National Cholesterol Education Program (NCEP) has set the  following guidelines (reference ranges) for Cholesterol:  Optimal.....................<200 mg/dL  Borderline High.............200-239 mg/dL  High........................> or = 240 mg/dL     08/18/2021 207 (H) 120 - 199 mg/dL Final     Comment:     The National Cholesterol Education Program (NCEP) has set the  following guidelines (reference ranges) for Cholesterol:  Optimal.....................<200  mg/dL  Borderline High.............200-239 mg/dL  High........................> or = 240 mg/dL       Hemoglobin A1C   Date Value Ref Range Status   01/24/2023 5.4 4.0 - 5.6 % Final     Comment:     ADA Screening Guidelines:  5.7-6.4%  Consistent with prediabetes  >or=6.5%  Consistent with diabetes    High levels of fetal hemoglobin interfere with the HbA1C  assay. Heterozygous hemoglobin variants (HbS, HgC, etc)do  not significantly interfere with this assay.   However, presence of multiple variants may affect accuracy.       Hemoglobin   Date Value Ref Range Status   01/24/2023 12.5 12.0 - 16.0 g/dL Final   08/18/2021 12.9 12.0 - 16.0 g/dL Final     Hematocrit   Date Value Ref Range Status   01/24/2023 39.6 37.0 - 48.5 % Final   08/18/2021 40.7 37.0 - 48.5 % Final       I have reviewed the following:     Details / Date    [x]   Labs     []   Micro     []   Pathology     []   Imaging     [x]   Cardiology Procedures 03/23/2023     []   Other      In Office EKG  NSR with T wave abnormality, 87 bpm    Assessment and Plan:     1. Other chest pain  - IN OFFICE EKG 12-LEAD (to Muse)  - X-Ray Sternum; Future  - X-Ray Ribs 3 Views Bilateral; Future    2. Chronic fatigue  - Vitamin B12; Future  - Vitamin D; Future

## 2023-03-23 ENCOUNTER — HOSPITAL ENCOUNTER (OUTPATIENT)
Dept: RADIOLOGY | Facility: HOSPITAL | Age: 38
Discharge: HOME | End: 2023-03-23
Attending: NURSE PRACTITIONER
Payer: COMMERCIAL

## 2023-03-23 ENCOUNTER — PATIENT MESSAGE (OUTPATIENT)
Dept: INTERNAL MEDICINE | Facility: CLINIC | Age: 38
End: 2023-03-23

## 2023-03-23 ENCOUNTER — OFFICE VISIT (OUTPATIENT)
Dept: INTERNAL MEDICINE | Facility: CLINIC | Age: 38
End: 2023-03-23
Payer: COMMERCIAL

## 2023-03-23 VITALS
DIASTOLIC BLOOD PRESSURE: 60 MMHG | HEART RATE: 64 BPM | SYSTOLIC BLOOD PRESSURE: 118 MMHG | BODY MASS INDEX: 32.28 KG/M2 | HEIGHT: 60 IN | TEMPERATURE: 96 F | WEIGHT: 164.44 LBS | OXYGEN SATURATION: 98 % | RESPIRATION RATE: 16 BRPM

## 2023-03-23 DIAGNOSIS — Q79.60 EHLERS-DANLOS SYNDROME: ICD-10-CM

## 2023-03-23 DIAGNOSIS — R53.82 CHRONIC FATIGUE: ICD-10-CM

## 2023-03-23 DIAGNOSIS — R07.89 OTHER CHEST PAIN: Primary | ICD-10-CM

## 2023-03-23 DIAGNOSIS — R07.89 OTHER CHEST PAIN: ICD-10-CM

## 2023-03-23 DIAGNOSIS — R94.31 ABNORMAL EKG: Primary | ICD-10-CM

## 2023-03-23 PROCEDURE — 1159F MED LIST DOCD IN RCRD: CPT | Mod: CPTII,S$GLB,, | Performed by: NURSE PRACTITIONER

## 2023-03-23 PROCEDURE — 71110 X-RAY EXAM RIBS BIL 3 VIEWS: CPT | Mod: TC,PO

## 2023-03-23 PROCEDURE — 3008F BODY MASS INDEX DOCD: CPT | Mod: CPTII,S$GLB,, | Performed by: NURSE PRACTITIONER

## 2023-03-23 PROCEDURE — 93005 EKG 12-LEAD: ICD-10-PCS | Mod: S$GLB,,, | Performed by: NURSE PRACTITIONER

## 2023-03-23 PROCEDURE — 99213 OFFICE O/P EST LOW 20 MIN: CPT | Mod: S$GLB,,, | Performed by: NURSE PRACTITIONER

## 2023-03-23 PROCEDURE — 93010 EKG 12-LEAD: ICD-10-PCS | Mod: S$GLB,,, | Performed by: INTERNAL MEDICINE

## 2023-03-23 PROCEDURE — 71110 X-RAY EXAM RIBS BIL 3 VIEWS: CPT | Mod: 26,,, | Performed by: RADIOLOGY

## 2023-03-23 PROCEDURE — 1159F PR MEDICATION LIST DOCUMENTED IN MEDICAL RECORD: ICD-10-PCS | Mod: CPTII,S$GLB,, | Performed by: NURSE PRACTITIONER

## 2023-03-23 PROCEDURE — 71120 XR STERNUM PA AND LATERAL: ICD-10-PCS | Mod: 26,,, | Performed by: RADIOLOGY

## 2023-03-23 PROCEDURE — 1160F RVW MEDS BY RX/DR IN RCRD: CPT | Mod: CPTII,S$GLB,, | Performed by: NURSE PRACTITIONER

## 2023-03-23 PROCEDURE — 3044F HG A1C LEVEL LT 7.0%: CPT | Mod: CPTII,S$GLB,, | Performed by: NURSE PRACTITIONER

## 2023-03-23 PROCEDURE — 1160F PR REVIEW ALL MEDS BY PRESCRIBER/CLIN PHARMACIST DOCUMENTED: ICD-10-PCS | Mod: CPTII,S$GLB,, | Performed by: NURSE PRACTITIONER

## 2023-03-23 PROCEDURE — 3078F DIAST BP <80 MM HG: CPT | Mod: CPTII,S$GLB,, | Performed by: NURSE PRACTITIONER

## 2023-03-23 PROCEDURE — 3008F PR BODY MASS INDEX (BMI) DOCUMENTED: ICD-10-PCS | Mod: CPTII,S$GLB,, | Performed by: NURSE PRACTITIONER

## 2023-03-23 PROCEDURE — 3074F SYST BP LT 130 MM HG: CPT | Mod: CPTII,S$GLB,, | Performed by: NURSE PRACTITIONER

## 2023-03-23 PROCEDURE — 3044F PR MOST RECENT HEMOGLOBIN A1C LEVEL <7.0%: ICD-10-PCS | Mod: CPTII,S$GLB,, | Performed by: NURSE PRACTITIONER

## 2023-03-23 PROCEDURE — 71120 X-RAY EXAM BREASTBONE 2/>VWS: CPT | Mod: 26,,, | Performed by: RADIOLOGY

## 2023-03-23 PROCEDURE — 99999 PR PBB SHADOW E&M-EST. PATIENT-LVL V: CPT | Mod: PBBFAC,,, | Performed by: NURSE PRACTITIONER

## 2023-03-23 PROCEDURE — 99213 PR OFFICE/OUTPT VISIT, EST, LEVL III, 20-29 MIN: ICD-10-PCS | Mod: S$GLB,,, | Performed by: NURSE PRACTITIONER

## 2023-03-23 PROCEDURE — 93005 ELECTROCARDIOGRAM TRACING: CPT | Mod: S$GLB,,, | Performed by: NURSE PRACTITIONER

## 2023-03-23 PROCEDURE — 71110 XR RIBS 3 VIEWS BILATERAL: ICD-10-PCS | Mod: 26,,, | Performed by: RADIOLOGY

## 2023-03-23 PROCEDURE — 71120 X-RAY EXAM BREASTBONE 2/>VWS: CPT | Mod: TC,PO

## 2023-03-23 PROCEDURE — 3074F PR MOST RECENT SYSTOLIC BLOOD PRESSURE < 130 MM HG: ICD-10-PCS | Mod: CPTII,S$GLB,, | Performed by: NURSE PRACTITIONER

## 2023-03-23 PROCEDURE — 3078F PR MOST RECENT DIASTOLIC BLOOD PRESSURE < 80 MM HG: ICD-10-PCS | Mod: CPTII,S$GLB,, | Performed by: NURSE PRACTITIONER

## 2023-03-23 PROCEDURE — 99999 PR PBB SHADOW E&M-EST. PATIENT-LVL V: ICD-10-PCS | Mod: PBBFAC,,, | Performed by: NURSE PRACTITIONER

## 2023-03-23 PROCEDURE — 93010 ELECTROCARDIOGRAM REPORT: CPT | Mod: S$GLB,,, | Performed by: INTERNAL MEDICINE

## 2023-03-23 RX ORDER — OXYCODONE AND ACETAMINOPHEN 10; 325 MG/1; MG/1
1-2 TABLET ORAL DAILY PRN
COMMUNITY
Start: 2023-03-01

## 2023-06-09 NOTE — PROGRESS NOTES
Cardiology Clinic Note  Reason for Visit: establish care       HPI:     Luz Muñiz is a 37 y.o. F, who presents to establish care.      in 2019.  She has been on rosuvastatin with improvement in LDL to 92.    She has chest discomfort/pulling since Feb 23.  No worse with exertion. No better with rest.  Constant pain, always present.  Worsened by sudden movements, causing sharp pull.    Symptoms persisted despite rest.  PT helping some.    On echo review, she has a trileaflet aortic valve.    Medical: EDS hypermobility, HLD, migraine  Surgical: Reviewed, as below.  Family: Reviewed, as below. Father with valve replacement.  Social: Reviewed, as below. Never smoked.    ROS:    Pertinent ROS included in HPI and below.  PMH:     Past Medical History:   Diagnosis Date    Arthritis     EDS (Portia-Danlos syndrome)     GERD (gastroesophageal reflux disease)     Hyperlipidemia     Seasonal allergies 6/10/2015     Past Surgical History:   Procedure Laterality Date    COLONOSCOPY N/A 5/26/2021    Procedure: COLONOSCOPY;  Surgeon: Cornelio Webb MD;  Location: [address]);  Service: Endoscopy;  Laterality: N/A;  Covid test at [facility] on 5/23    ESOPHAGEAL MANOMETRY WITH MEASUREMENT OF IMPEDANCE N/A 12/23/2019    Procedure: MANOMETRY, ESOPHAGUS, WITH IMPEDANCE MEASUREMENT;  Surgeon: Bradly Germain MD;  Location: [facility] ([address] [place] [address]);  Service: Endoscopy;  Laterality: N/A;  12/17 - pt confirmed    ESOPHAGOGASTRODUODENOSCOPY N/A 5/26/2021    Procedure: EGD (ESOPHAGOGASTRODUODENOSCOPY);  Surgeon: Cornelio Webb MD;  Location: [address]);  Service: Endoscopy;  Laterality: N/A;    TONSILLECTOMY  2014    WRIST SURGERY      Ligament repair of Right wrist x 2     Allergies:     Review of patient's allergies indicates:   Allergen Reactions    Sulfa (sulfonamide antibiotics)     Codeine Rash    Sulfamethoxazole-trimethoprim Nausea And Vomiting and Other (See Comments)     Medications:  "    Current Outpatient Medications:     azelastine (ASTELIN) 137 mcg (0.1 %) nasal spray, 1 spray by Nasal route 2 (two) times daily., Disp: , Rfl: 5    b complex vitamins tablet, Take 1 tablet by mouth once daily., Disp: , Rfl:     BD ALLERGIST TRAY REG BEVEL 1 mL 27 x 1/2" Syrg, USE ONCE WEEKLY AS DIRECTED, Disp: , Rfl:     buprenorphine HCL (BELBUCA) 300 mcg Film, Place 300 mcg inside cheek 2 (two) times daily., Disp: , Rfl:     CALCIUM ORAL, Take by mouth., Disp: , Rfl:     cetirizine (ZYRTEC) 10 MG tablet, Take 1 tablet (10 mg total) by mouth once daily., Disp: 90 tablet, Rfl: 3    desog-e.estradioL/e.estradioL (VIORELE, 28,) 0.15-0.02 mgx21 /0.01 mg x 5 per tablet, Take 1 tablet by mouth once daily., Disp: 84 tablet, Rfl: 3    diclofenac sodium (VOLTAREN) 1 % Gel, diclofenac 1 % topical gel, Disp: , Rfl:     EScitalopram oxalate (LEXAPRO) 10 MG tablet, Take 1 tablet (10 mg total) by mouth once daily., Disp: 30 tablet, Rfl: 11    EScitalopram oxalate (LEXAPRO) 5 MG Tab, Take 1 tablet (5 mg total) by mouth once daily., Disp: 30 tablet, Rfl: 11    esomeprazole (NEXIUM) 40 MG capsule, TAKE 1 CAPSULE BY MOUTH BEFORE BREAKFAST, Disp: 90 capsule, Rfl: 3    etodolac (LODINE) 400 MG tablet, Take 1 tablet (400 mg total) by mouth every 6 (six) hours as needed., Disp: 60 tablet, Rfl: 0    ferrous sulfate (FEOSOL) 325 mg (65 mg iron) Tab tablet, Take 1 tablet (325 mg total) by mouth every 12 (twelve) hours. (Patient taking differently: Take 325 mg by mouth once daily.), Disp: 60 tablet, Rfl: 4    fluticasone propionate (FLONASE) 50 mcg/actuation nasal spray, 1 spray by Each Nostril route 2 (two) times daily., Disp: , Rfl:     HYDROcodone-acetaminophen (NORCO)  mg per tablet, TAKE ONE TABLET BY MOUTH EVERY 4 6 HOURS AS NEEDED FOR PAIN, Disp: , Rfl:     hyoscyamine (LEVSIN/SL) 0.125 mg Subl, PLEASE SEE ATTACHED FOR DETAILED DIRECTIONS, Disp: 60 tablet, Rfl: 0    Lactobac no.41/Bifidobact no.7 (PROBIOTIC-10 ORAL), Take " by mouth once daily., Disp: , Rfl:     metaxalone (SKELAXIN) 800 MG tablet, nightly., Disp: , Rfl:     ondansetron (ZOFRAN) 4 MG tablet, Take 1 tablet (4 mg total) by mouth every 8 (eight) hours as needed for Nausea. (Patient not taking: Reported on 3/23/2023), Disp: 30 tablet, Rfl: 0    oxyCODONE-acetaminophen (PERCOCET)  mg per tablet, Take 1-2 tablets by mouth daily as needed., Disp: , Rfl:     psyllium husk (METAMUCIL ORAL), Take 1 tablet by mouth once daily., Disp: , Rfl:     rimegepant (NURTEC) 75 mg odt, Nurtec ODT 75 mg disintegrating tablet, Disp: , Rfl:     rosuvastatin (CRESTOR) 10 MG tablet, Take 1 tablet (10 mg total) by mouth once daily., Disp: 90 tablet, Rfl: 3   Social History:     Social History     Tobacco Use    Smoking status: Never    Smokeless tobacco: Never   Substance Use Topics    Alcohol use: Yes     Alcohol/week: 1.0 standard drink     Types: 1 Glasses of wine per week     Comment: socially     Family History:     Family History   Problem Relation Age of Onset    Hyperlipidemia Father     Heart disease Father         valve replacement    Hyperlipidemia Mother     Cancer Maternal Grandmother         skin    No Known Problems Brother     Acne Neg Hx     Colon cancer Neg Hx     Ovarian cancer Neg Hx     Breast cancer Neg Hx     Diabetes Neg Hx     Hypertension Neg Hx     Eclampsia Neg Hx     Miscarriages / Stillbirths Neg Hx      labor Neg Hx     Stroke Neg Hx     Colon polyps Neg Hx     Liver cancer Neg Hx     Liver disease Neg Hx     Cirrhosis Neg Hx     Rectal cancer Neg Hx     Stomach cancer Neg Hx     Esophageal cancer Neg Hx     Celiac disease Neg Hx     Inflammatory bowel disease Neg Hx     Crohn's disease Neg Hx      Physical Exam:   /64   Pulse 89   Ht 5' (1.524 m)   Wt 75.7 kg (166 lb 14.2 oz)   SpO2 98%   BMI 32.59 kg/m²      Constitutional: No apparent distress, conversant  Neck: No jugular venous distension, no carotid bruits  CV: Regular rate and rhythm,  no murmurs, normal S1/S2  Pulm: Clear to auscultation bilaterally  Extremities: No lower extremity edema, warm with palpable pulses    Labs:     Blood Tests:  Lab Results   Component Value Date    BNP 24 10/09/2019     01/24/2023    K 3.8 01/24/2023     01/24/2023    CO2 18 (L) 01/24/2023    BUN 13 01/24/2023    CREATININE 0.7 01/24/2023    GLU 97 01/24/2023    HGBA1C 5.4 01/24/2023    MG 2.1 05/25/2020    AST 26 01/24/2023    ALT 29 01/24/2023    ALBUMIN 3.5 01/24/2023    PROT 6.8 01/24/2023    BILITOT 0.3 01/24/2023    WBC 8.08 01/24/2023    HGB 12.5 01/24/2023    HCT 39.6 01/24/2023    MCV 92 01/24/2023     01/24/2023    TSH 3.165 01/24/2023       Lab Results   Component Value Date    CHOL 172 01/24/2023    HDL 51 01/24/2023    TRIG 142 01/24/2023       Lab Results   Component Value Date    LDLCALC 92.6 01/24/2023       Urine Tests:  Lab Results   Component Value Date    COLORU Yellow 08/18/2021    APPEARANCEUA Hazy (A) 08/18/2021    PHUR 5.0 08/18/2021    SPECGRAV 1.025 08/18/2021    PROTEINUA Negative 08/18/2021    GLUCUA Negative 08/18/2021    KETONESU Negative 08/18/2021    BILIRUBINUA Negative 08/18/2021    OCCULTUA Negative 08/18/2021    NITRITE Negative 08/18/2021    UROBILINOGEN Negative 02/22/2017    LEUKOCYTESUR Negative 08/18/2021       Imaging:     Echocardiogram  TTE 7/16/20  Normal left ventricular systolic function. The estimated ejection fraction is 63%.  Septal wall has abnormal motion.  Normal LV diastolic function.  Normal right ventricular systolic function.  Normal central venous pressure (3 mmHg).  The estimated PA systolic pressure is 22 mmHg.    Stress testing  None    Cath Lab  None    Other  None    EKG:   3/23/23 - NSRNATO (personally reviewed)    Assessment:     1. Mixed hyperlipidemia    2. Abnormal EKG    3. Other chest pain    4. Portia-Danlos syndrome      Plan:     Abnormal EKG  Non-specific changes  Echo without structural abnormalities  No further  evaluation    Other chest pain  Musculoskeletal by history  Continue with pain management    Portia-Danlos syndrome  Stable  Hypermobile type    Hyperlipidemia  Continue statin    Signed:  Karlo Gonzales MD  Cardiology     Follow-up:     Future Appointments   Date Time Provider Department Center   6/12/2023 11:00 AM Juanjo Gonzales III, MD [facility]ARD[facility]

## 2023-06-10 ENCOUNTER — PATIENT MESSAGE (OUTPATIENT)
Dept: INTERNAL MEDICINE | Facility: CLINIC | Age: 38
End: 2023-06-10
Payer: COMMERCIAL

## 2023-06-11 ENCOUNTER — PATIENT MESSAGE (OUTPATIENT)
Dept: INTERNAL MEDICINE | Facility: CLINIC | Age: 38
End: 2023-06-11
Payer: COMMERCIAL

## 2023-06-12 ENCOUNTER — OFFICE VISIT (OUTPATIENT)
Dept: CARDIOLOGY | Facility: CLINIC | Age: 38
End: 2023-06-12
Payer: COMMERCIAL

## 2023-06-12 VITALS
DIASTOLIC BLOOD PRESSURE: 64 MMHG | SYSTOLIC BLOOD PRESSURE: 118 MMHG | OXYGEN SATURATION: 98 % | HEIGHT: 60 IN | WEIGHT: 166.88 LBS | BODY MASS INDEX: 32.76 KG/M2 | HEART RATE: 89 BPM

## 2023-06-12 DIAGNOSIS — Q79.60 EHLERS-DANLOS SYNDROME: ICD-10-CM

## 2023-06-12 DIAGNOSIS — R07.89 OTHER CHEST PAIN: ICD-10-CM

## 2023-06-12 DIAGNOSIS — R94.31 ABNORMAL EKG: ICD-10-CM

## 2023-06-12 DIAGNOSIS — E78.2 MIXED HYPERLIPIDEMIA: Primary | ICD-10-CM

## 2023-06-12 PROCEDURE — 3008F BODY MASS INDEX DOCD: CPT | Mod: CPTII,S$GLB,, | Performed by: INTERNAL MEDICINE

## 2023-06-12 PROCEDURE — 99214 OFFICE O/P EST MOD 30 MIN: CPT | Mod: S$GLB,,, | Performed by: INTERNAL MEDICINE

## 2023-06-12 PROCEDURE — 3044F HG A1C LEVEL LT 7.0%: CPT | Mod: CPTII,S$GLB,, | Performed by: INTERNAL MEDICINE

## 2023-06-12 PROCEDURE — 1159F PR MEDICATION LIST DOCUMENTED IN MEDICAL RECORD: ICD-10-PCS | Mod: CPTII,S$GLB,, | Performed by: INTERNAL MEDICINE

## 2023-06-12 PROCEDURE — 3074F PR MOST RECENT SYSTOLIC BLOOD PRESSURE < 130 MM HG: ICD-10-PCS | Mod: CPTII,S$GLB,, | Performed by: INTERNAL MEDICINE

## 2023-06-12 PROCEDURE — 1159F MED LIST DOCD IN RCRD: CPT | Mod: CPTII,S$GLB,, | Performed by: INTERNAL MEDICINE

## 2023-06-12 PROCEDURE — 3078F DIAST BP <80 MM HG: CPT | Mod: CPTII,S$GLB,, | Performed by: INTERNAL MEDICINE

## 2023-06-12 PROCEDURE — 99999 PR PBB SHADOW E&M-EST. PATIENT-LVL V: ICD-10-PCS | Mod: PBBFAC,,, | Performed by: INTERNAL MEDICINE

## 2023-06-12 PROCEDURE — 3008F PR BODY MASS INDEX (BMI) DOCUMENTED: ICD-10-PCS | Mod: CPTII,S$GLB,, | Performed by: INTERNAL MEDICINE

## 2023-06-12 PROCEDURE — 3078F PR MOST RECENT DIASTOLIC BLOOD PRESSURE < 80 MM HG: ICD-10-PCS | Mod: CPTII,S$GLB,, | Performed by: INTERNAL MEDICINE

## 2023-06-12 PROCEDURE — 3074F SYST BP LT 130 MM HG: CPT | Mod: CPTII,S$GLB,, | Performed by: INTERNAL MEDICINE

## 2023-06-12 PROCEDURE — 99999 PR PBB SHADOW E&M-EST. PATIENT-LVL V: CPT | Mod: PBBFAC,,, | Performed by: INTERNAL MEDICINE

## 2023-06-12 PROCEDURE — 99214 PR OFFICE/OUTPT VISIT, EST, LEVL IV, 30-39 MIN: ICD-10-PCS | Mod: S$GLB,,, | Performed by: INTERNAL MEDICINE

## 2023-06-12 PROCEDURE — 3044F PR MOST RECENT HEMOGLOBIN A1C LEVEL <7.0%: ICD-10-PCS | Mod: CPTII,S$GLB,, | Performed by: INTERNAL MEDICINE

## 2023-06-12 RX ORDER — ESCITALOPRAM OXALATE 20 MG/1
20 TABLET ORAL DAILY
Qty: 90 TABLET | Refills: 3 | Status: SHIPPED | OUTPATIENT
Start: 2023-06-12 | End: 2024-01-23 | Stop reason: SDUPTHER

## 2023-07-25 NOTE — TELEPHONE ENCOUNTER
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  He was seen by vascular and started on anticoagulation with Eliquis.\par He has been feeling well and has been back to his baseline.  Remains very active.  He plays golf regularly, goes for regular walks.\par Denies any cardiopulmonary complaints.  No complaints of chest pain or chest pressure.  No shortness of breath or dyspnea exertion.  No palpitations.  No dizziness, lightheadedness, syncope or near syncope.  No edema, orthopnea.  No PND.\par On anticoagulation, he denies any excessive ecchymosis, bleeding, black or bloody stools, hematuria or epistaxis.\par \par

## 2023-07-28 ENCOUNTER — PATIENT MESSAGE (OUTPATIENT)
Dept: INTERNAL MEDICINE | Facility: CLINIC | Age: 38
End: 2023-07-28
Payer: COMMERCIAL

## 2023-07-28 DIAGNOSIS — J30.2 SEASONAL ALLERGIES: ICD-10-CM

## 2023-07-31 ENCOUNTER — PATIENT MESSAGE (OUTPATIENT)
Dept: INTERNAL MEDICINE | Facility: CLINIC | Age: 38
End: 2023-07-31
Payer: COMMERCIAL

## 2023-07-31 RX ORDER — CETIRIZINE HYDROCHLORIDE 10 MG/1
10 TABLET ORAL 2 TIMES DAILY
Qty: 180 TABLET | Refills: 3 | Status: SHIPPED | OUTPATIENT
Start: 2023-07-31 | End: 2024-02-07 | Stop reason: SDUPTHER

## 2023-08-21 NOTE — PROGRESS NOTES
"Subjective:       Patient ID: Luz Muñiz is a 37 y.o. female.    Chief Complaint: Hospital Follow Up (Pyelonephritis 23; pt state x2 episodes and only c/o lower abd pain and lower back pain. No difficulty w/urination)    Patient is a 37 y.o. female who traditionally follows with No, Primary Doctor presenting today for:     Kidney/Urinary tract infection  Patient reports in July she was having lower abdominal and back pain without urinary tract symptoms. She was evaluated in ER on  with imaging and diagnosed with Pyelonephritis. Treated with Ceftin.     Patient notes about 1.5 weeks ago she awoke with severe midline lower back pain that progressed to lower abdominal pain. At that time she was not having diarrhea, nausea, vomiting, fever/chills, dysuria, frequency or urgency. She started Doxycycline that a family member had left over and notes she has been taking it twice per day since that time and has one tablet left. States about 3 days into Doxycyline she was feeling better. Did have some lingering fatigue but notes history of chronic fatigue.     Review of patient's allergies indicates:   Allergen Reactions    Sulfa (sulfonamide antibiotics)     Codeine Rash    Sulfamethoxazole-trimethoprim Nausea And Vomiting and Other (See Comments)       Medication List with Changes/Refills   Current Medications    AZELASTINE (ASTELIN) 137 MCG (0.1 %) NASAL SPRAY    1 spray by Nasal route 2 (two) times daily.    B COMPLEX VITAMINS TABLET    Take 1 tablet by mouth once daily.    BD ALLERGIST TRAY REG BEVEL 1 ML 27 X 1/2" SYRG    USE ONCE WEEKLY AS DIRECTED    BELBUCA 600 MCG FILM    SMARTSI Strip(s) buccal Every 12 Hours    CALCIUM ORAL    Take by mouth.    CETIRIZINE (ZYRTEC) 10 MG TABLET    Take 1 tablet (10 mg total) by mouth 2 (two) times a day.    DESOG-E.ESTRADIOL/E.ESTRADIOL (VIORELE, 28,) 0.15-0.02 MGX21 /0.01 MG X 5 PER TABLET    Take 1 tablet by mouth once daily.    DICLOFENAC SODIUM (VOLTAREN) 1 " % GEL    diclofenac 1 % topical gel    ESCITALOPRAM OXALATE (LEXAPRO) 20 MG TABLET    Take 1 tablet (20 mg total) by mouth once daily.    ESOMEPRAZOLE (NEXIUM) 40 MG CAPSULE    TAKE 1 CAPSULE BY MOUTH BEFORE BREAKFAST    ETODOLAC (LODINE) 400 MG TABLET    Take 1 tablet (400 mg total) by mouth every 6 (six) hours as needed.    FERROUS SULFATE (FEOSOL) 325 MG (65 MG IRON) TAB TABLET    Take 1 tablet (325 mg total) by mouth every 12 (twelve) hours.    FLUTICASONE PROPIONATE (FLONASE) 50 MCG/ACTUATION NASAL SPRAY    1 spray by Each Nostril route 2 (two) times daily.    HYOSCYAMINE (LEVSIN/SL) 0.125 MG SUBL    PLEASE SEE ATTACHED FOR DETAILED DIRECTIONS    LACTOBAC NO.41/BIFIDOBACT NO.7 (PROBIOTIC-10 ORAL)    Take by mouth once daily.    METAXALONE (SKELAXIN) 800 MG TABLET    nightly.    ONDANSETRON (ZOFRAN) 4 MG TABLET    Take 1 tablet (4 mg total) by mouth every 8 (eight) hours as needed for Nausea.    OXYCODONE-ACETAMINOPHEN (PERCOCET)  MG PER TABLET    Take 1-2 tablets by mouth daily as needed.    PSYLLIUM HUSK (METAMUCIL ORAL)    Take 1 tablet by mouth once daily.    RIMEGEPANT (NURTEC) 75 MG ODT    Nurtec ODT 75 mg disintegrating tablet    ROSUVASTATIN (CRESTOR) 10 MG TABLET    Take 1 tablet (10 mg total) by mouth once daily.     Medical, social and surgical history has been reviewed with the patient.      Review of Systems   Constitutional:  Negative for chills and fever.   Respiratory:  Negative for cough and shortness of breath.    Cardiovascular:  Negative for chest pain.   Gastrointestinal:  Negative for abdominal pain, nausea and vomiting.   Genitourinary:  Negative for dysuria, frequency and urgency.   Neurological:  Negative for dizziness and headaches.     Objective:   /72 (BP Location: Right arm, Patient Position: Sitting, BP Method: Medium (Manual))   Pulse 92   Temp 98.1 °F (36.7 °C) (Temporal)   Resp 18   Ht 5' (1.524 m)   Wt 73 kg (160 lb 15 oz)   SpO2 98%   BMI 31.43 kg/m²        Physical Exam  Vitals reviewed.   Constitutional:       Appearance: Normal appearance.   HENT:      Head: Normocephalic and atraumatic.   Cardiovascular:      Rate and Rhythm: Normal rate and regular rhythm.      Heart sounds: Normal heart sounds. No murmur heard.  Pulmonary:      Effort: Pulmonary effort is normal.      Breath sounds: Normal breath sounds. No wheezing.   Abdominal:      General: Bowel sounds are normal.      Palpations: Abdomen is soft.      Tenderness: There is no abdominal tenderness. There is no right CVA tenderness or left CVA tenderness.   Musculoskeletal:      Lumbar back: No tenderness or bony tenderness.   Skin:     General: Skin is warm and dry.   Neurological:      Mental Status: She is alert and oriented to person, place, and time.       Assessment and Plan:     1. History of pyelonephritis  - Urinalysis, Reflex to Urine Culture Urine, Clean Catch; Future  - Ambulatory referral/consult to Urology; Future    2. Portia-Danlos syndrome    Patient encouraged to not take antibiotics without provider having prescribed them. Encouraged to call next time she has symptoms so a urine screen can be done. Will place referral to Urology for evaluation of UTIs without classic symptoms.

## 2023-08-22 ENCOUNTER — LAB VISIT (OUTPATIENT)
Dept: LAB | Facility: HOSPITAL | Age: 38
End: 2023-08-22
Payer: COMMERCIAL

## 2023-08-22 ENCOUNTER — OFFICE VISIT (OUTPATIENT)
Dept: INTERNAL MEDICINE | Facility: CLINIC | Age: 38
End: 2023-08-22
Payer: COMMERCIAL

## 2023-08-22 VITALS
OXYGEN SATURATION: 98 % | DIASTOLIC BLOOD PRESSURE: 72 MMHG | TEMPERATURE: 98 F | WEIGHT: 160.94 LBS | SYSTOLIC BLOOD PRESSURE: 104 MMHG | HEART RATE: 92 BPM | HEIGHT: 60 IN | BODY MASS INDEX: 31.6 KG/M2 | RESPIRATION RATE: 18 BRPM

## 2023-08-22 DIAGNOSIS — Z87.448 HISTORY OF PYELONEPHRITIS: Primary | ICD-10-CM

## 2023-08-22 DIAGNOSIS — Z87.448 HISTORY OF PYELONEPHRITIS: ICD-10-CM

## 2023-08-22 DIAGNOSIS — Q79.60 EHLERS-DANLOS SYNDROME: ICD-10-CM

## 2023-08-22 PROBLEM — M47.817 LUMBOSACRAL SPONDYLOSIS WITHOUT MYELOPATHY: Status: RESOLVED | Noted: 2021-11-24 | Resolved: 2023-08-22

## 2023-08-22 LAB
BILIRUB UR QL STRIP: NEGATIVE
CLARITY UR REFRACT.AUTO: ABNORMAL
COLOR UR AUTO: YELLOW
GLUCOSE UR QL STRIP: NEGATIVE
HGB UR QL STRIP: NEGATIVE
KETONES UR QL STRIP: NEGATIVE
LEUKOCYTE ESTERASE UR QL STRIP: NEGATIVE
NITRITE UR QL STRIP: NEGATIVE
PH UR STRIP: 6 [PH] (ref 5–8)
PROT UR QL STRIP: ABNORMAL
SP GR UR STRIP: >1.03 (ref 1–1.03)
URN SPEC COLLECT METH UR: ABNORMAL

## 2023-08-22 PROCEDURE — 1159F MED LIST DOCD IN RCRD: CPT | Mod: CPTII,S$GLB,, | Performed by: NURSE PRACTITIONER

## 2023-08-22 PROCEDURE — 3008F BODY MASS INDEX DOCD: CPT | Mod: CPTII,S$GLB,, | Performed by: NURSE PRACTITIONER

## 2023-08-22 PROCEDURE — 3008F PR BODY MASS INDEX (BMI) DOCUMENTED: ICD-10-PCS | Mod: CPTII,S$GLB,, | Performed by: NURSE PRACTITIONER

## 2023-08-22 PROCEDURE — 3074F SYST BP LT 130 MM HG: CPT | Mod: CPTII,S$GLB,, | Performed by: NURSE PRACTITIONER

## 2023-08-22 PROCEDURE — 99999 PR PBB SHADOW E&M-EST. PATIENT-LVL V: CPT | Mod: PBBFAC,,, | Performed by: NURSE PRACTITIONER

## 2023-08-22 PROCEDURE — 99213 OFFICE O/P EST LOW 20 MIN: CPT | Mod: S$GLB,,, | Performed by: NURSE PRACTITIONER

## 2023-08-22 PROCEDURE — 3074F PR MOST RECENT SYSTOLIC BLOOD PRESSURE < 130 MM HG: ICD-10-PCS | Mod: CPTII,S$GLB,, | Performed by: NURSE PRACTITIONER

## 2023-08-22 PROCEDURE — 1160F RVW MEDS BY RX/DR IN RCRD: CPT | Mod: CPTII,S$GLB,, | Performed by: NURSE PRACTITIONER

## 2023-08-22 PROCEDURE — 3078F DIAST BP <80 MM HG: CPT | Mod: CPTII,S$GLB,, | Performed by: NURSE PRACTITIONER

## 2023-08-22 PROCEDURE — 99999 PR PBB SHADOW E&M-EST. PATIENT-LVL V: ICD-10-PCS | Mod: PBBFAC,,, | Performed by: NURSE PRACTITIONER

## 2023-08-22 PROCEDURE — 81003 URINALYSIS AUTO W/O SCOPE: CPT | Performed by: NURSE PRACTITIONER

## 2023-08-22 PROCEDURE — 99213 PR OFFICE/OUTPT VISIT, EST, LEVL III, 20-29 MIN: ICD-10-PCS | Mod: S$GLB,,, | Performed by: NURSE PRACTITIONER

## 2023-08-22 PROCEDURE — 3044F HG A1C LEVEL LT 7.0%: CPT | Mod: CPTII,S$GLB,, | Performed by: NURSE PRACTITIONER

## 2023-08-22 PROCEDURE — 1160F PR REVIEW ALL MEDS BY PRESCRIBER/CLIN PHARMACIST DOCUMENTED: ICD-10-PCS | Mod: CPTII,S$GLB,, | Performed by: NURSE PRACTITIONER

## 2023-08-22 PROCEDURE — 3044F PR MOST RECENT HEMOGLOBIN A1C LEVEL <7.0%: ICD-10-PCS | Mod: CPTII,S$GLB,, | Performed by: NURSE PRACTITIONER

## 2023-08-22 PROCEDURE — 3078F PR MOST RECENT DIASTOLIC BLOOD PRESSURE < 80 MM HG: ICD-10-PCS | Mod: CPTII,S$GLB,, | Performed by: NURSE PRACTITIONER

## 2023-08-22 PROCEDURE — 1159F PR MEDICATION LIST DOCUMENTED IN MEDICAL RECORD: ICD-10-PCS | Mod: CPTII,S$GLB,, | Performed by: NURSE PRACTITIONER

## 2023-08-22 RX ORDER — BUPRENORPHINE HYDROCHLORIDE 600 UG/1
FILM, SOLUBLE BUCCAL
COMMUNITY
Start: 2023-07-25

## 2023-09-11 ENCOUNTER — OFFICE VISIT (OUTPATIENT)
Dept: UROLOGY | Facility: CLINIC | Age: 38
End: 2023-09-11
Payer: COMMERCIAL

## 2023-09-11 ENCOUNTER — LAB VISIT (OUTPATIENT)
Dept: LAB | Facility: HOSPITAL | Age: 38
End: 2023-09-11
Payer: COMMERCIAL

## 2023-09-11 VITALS — HEIGHT: 60 IN | WEIGHT: 160.63 LBS | BODY MASS INDEX: 31.54 KG/M2

## 2023-09-11 DIAGNOSIS — N12 PYELONEPHRITIS: ICD-10-CM

## 2023-09-11 DIAGNOSIS — Z87.448 HISTORY OF PYELONEPHRITIS: ICD-10-CM

## 2023-09-11 PROCEDURE — 99204 PR OFFICE/OUTPT VISIT, NEW, LEVL IV, 45-59 MIN: ICD-10-PCS | Mod: S$GLB,,, | Performed by: UROLOGY

## 2023-09-11 PROCEDURE — 3044F HG A1C LEVEL LT 7.0%: CPT | Mod: CPTII,S$GLB,, | Performed by: UROLOGY

## 2023-09-11 PROCEDURE — 1159F MED LIST DOCD IN RCRD: CPT | Mod: CPTII,S$GLB,, | Performed by: UROLOGY

## 2023-09-11 PROCEDURE — 87086 URINE CULTURE/COLONY COUNT: CPT | Performed by: STUDENT IN AN ORGANIZED HEALTH CARE EDUCATION/TRAINING PROGRAM

## 2023-09-11 PROCEDURE — 99999 PR PBB SHADOW E&M-EST. PATIENT-LVL IV: ICD-10-PCS | Mod: PBBFAC,,, | Performed by: UROLOGY

## 2023-09-11 PROCEDURE — 99204 OFFICE O/P NEW MOD 45 MIN: CPT | Mod: S$GLB,,, | Performed by: UROLOGY

## 2023-09-11 PROCEDURE — 3044F PR MOST RECENT HEMOGLOBIN A1C LEVEL <7.0%: ICD-10-PCS | Mod: CPTII,S$GLB,, | Performed by: UROLOGY

## 2023-09-11 PROCEDURE — 3008F BODY MASS INDEX DOCD: CPT | Mod: CPTII,S$GLB,, | Performed by: UROLOGY

## 2023-09-11 PROCEDURE — 99999 PR PBB SHADOW E&M-EST. PATIENT-LVL IV: CPT | Mod: PBBFAC,,, | Performed by: UROLOGY

## 2023-09-11 PROCEDURE — 3008F PR BODY MASS INDEX (BMI) DOCUMENTED: ICD-10-PCS | Mod: CPTII,S$GLB,, | Performed by: UROLOGY

## 2023-09-11 PROCEDURE — 1159F PR MEDICATION LIST DOCUMENTED IN MEDICAL RECORD: ICD-10-PCS | Mod: CPTII,S$GLB,, | Performed by: UROLOGY

## 2023-09-11 NOTE — PROGRESS NOTES
[place] - Urology   Clinic Note    SUBJECTIVE:     Chief Complaint   Patient presents with    Other     Pyelonephritis        Referral from: Tayla Lane NP.    History of Present Illness:  Luz Muñiz is a 37 y.o. female who presents to clinic for evaluation of recurrent UTI, pyelonephritis.    Patient states that she has had issues with frequent UTI in the past, recently developed pyelonephritis which was treated at  in 7/2023. States that she improved with antibiotics and developed another infection soon thereafter. She denies any history of hematuria, urolithiasis. Denies any FH. She does have constipation, takes metamucil daily and has a soft BM every other day.    Unable to see her CT scan from , per report no evidence of any obstructive process.     Patient endorses no additional complaints at this time.    Past Medical History:   Diagnosis Date    Arthritis     EDS (Portia-Danlos syndrome)     GERD (gastroesophageal reflux disease)     Hyperlipidemia     Seasonal allergies 6/10/2015       Past Surgical History:   Procedure Laterality Date    COLONOSCOPY N/A 5/26/2021    Procedure: COLONOSCOPY;  Surgeon: Cornelio Webb MD;  Location: [address]);  Service: Endoscopy;  Laterality: N/A;  Covid test at [place] on 5/23    ESOPHAGEAL MANOMETRY WITH MEASUREMENT OF IMPEDANCE N/A 12/23/2019    Procedure: MANOMETRY, ESOPHAGUS, WITH IMPEDANCE MEASUREMENT;  Surgeon: Bradly Germain MD;  Location: [address]);  Service: Endoscopy;  Laterality: N/A;  12/17 - pt confirmed    ESOPHAGOGASTRODUODENOSCOPY N/A 5/26/2021    Procedure: EGD (ESOPHAGOGASTRODUODENOSCOPY);  Surgeon: Cornelio Webb MD;  Location: [address]);  Service: Endoscopy;  Laterality: N/A;    TONSILLECTOMY  2014    WRIST SURGERY      Ligament repair of Right wrist x 2       Family History   Problem Relation Age of Onset    Hyperlipidemia Father     Heart disease Father         valve replacement    Hyperlipidemia  "Mother     Cancer Maternal Grandmother         skin    No Known Problems Brother     Acne Neg Hx     Colon cancer Neg Hx     Ovarian cancer Neg Hx     Breast cancer Neg Hx     Diabetes Neg Hx     Hypertension Neg Hx     Eclampsia Neg Hx     Miscarriages / Stillbirths Neg Hx      labor Neg Hx     Stroke Neg Hx     Colon polyps Neg Hx     Liver cancer Neg Hx     Liver disease Neg Hx     Cirrhosis Neg Hx     Rectal cancer Neg Hx     Stomach cancer Neg Hx     Esophageal cancer Neg Hx     Celiac disease Neg Hx     Inflammatory bowel disease Neg Hx     Crohn's disease Neg Hx        Social History     Tobacco Use    Smoking status: Never    Smokeless tobacco: Never   Substance Use Topics    Alcohol use: Yes     Alcohol/week: 1.0 standard drink of alcohol     Types: 1 Glasses of wine per week     Comment: socially    Drug use: No       Current Outpatient Medications on File Prior to Visit   Medication Sig Dispense Refill    azelastine (ASTELIN) 137 mcg (0.1 %) nasal spray 1 spray by Nasal route 2 (two) times daily.  5    b complex vitamins tablet Take 1 tablet by mouth once daily.      BD ALLERGIST TRAY REG BEVEL 1 mL 27 x 1/2" Syrg USE ONCE WEEKLY AS DIRECTED      BELBUCA 600 mcg Film SMARTSI Strip(s) buccal Every 12 Hours      CALCIUM ORAL Take by mouth.      cetirizine (ZYRTEC) 10 MG tablet Take 1 tablet (10 mg total) by mouth 2 (two) times a day. 180 tablet 3    desog-e.estradioL/e.estradioL (VIORELE, 28,) 0.15-0.02 mgx21 /0.01 mg x 5 per tablet Take 1 tablet by mouth once daily. 84 tablet 3    diclofenac sodium (VOLTAREN) 1 % Gel diclofenac 1 % topical gel      EScitalopram oxalate (LEXAPRO) 20 MG tablet Take 1 tablet (20 mg total) by mouth once daily. 90 tablet 3    esomeprazole (NEXIUM) 40 MG capsule TAKE 1 CAPSULE BY MOUTH BEFORE BREAKFAST 90 capsule 3    etodolac (LODINE) 400 MG tablet Take 1 tablet (400 mg total) by mouth every 6 (six) hours as needed. 60 tablet 0    ferrous sulfate (FEOSOL) 325 mg (65 " mg iron) Tab tablet Take 1 tablet (325 mg total) by mouth every 12 (twelve) hours. (Patient taking differently: Take 325 mg by mouth once daily.) 60 tablet 4    fluticasone propionate (FLONASE) 50 mcg/actuation nasal spray 1 spray by Each Nostril route 2 (two) times daily.      hyoscyamine (LEVSIN/SL) 0.125 mg Subl PLEASE SEE ATTACHED FOR DETAILED DIRECTIONS 60 tablet 0    Lactobac no.41/Bifidobact no.7 (PROBIOTIC-10 ORAL) Take by mouth once daily.      metaxalone (SKELAXIN) 800 MG tablet nightly.      ondansetron (ZOFRAN) 4 MG tablet Take 1 tablet (4 mg total) by mouth every 8 (eight) hours as needed for Nausea. 30 tablet 0    oxyCODONE-acetaminophen (PERCOCET)  mg per tablet Take 1-2 tablets by mouth daily as needed.      psyllium husk (METAMUCIL ORAL) Take 1 tablet by mouth once daily.      rimegepant (NURTEC) 75 mg odt Nurtec ODT 75 mg disintegrating tablet      rosuvastatin (CRESTOR) 10 MG tablet Take 1 tablet (10 mg total) by mouth once daily. 90 tablet 3     No current facility-administered medications on file prior to visit.       Review of patient's allergies indicates:   Allergen Reactions    Sulfa (sulfonamide antibiotics)     Codeine Rash    Sulfamethoxazole-trimethoprim Nausea And Vomiting and Other (See Comments)       Review of Systems:  A review of 10+ systems was conducted with pertinent positive and negative findings documented in HPI with all other systems reviewed and negative.    OBJECTIVE:     Estimated body mass index is 31.37 kg/m² as calculated from the following:    Height as of this encounter: 5' (1.524 m).    Weight as of this encounter: 72.8 kg (160 lb 9.7 oz).    Vital Signs (Most Recent)  There were no vitals filed for this visit.    Physical Exam:  GENERAL: patient sitting comfortably  HEENT: normocephalic  NECK: supple, no JVD  PULM: normal chest rise, no increased WOB  HEART: non-diaphoretic  ABDO: soft, nondistended, nontender  BACK: no CVA tenderness bilaterally  SKIN: warm,  dry, well perfused  EXT: no bruising or edema  NEURO: grossly normal with no focal deficits  PSYCH: appropriate mood and affect    Genitourinary Exam:  deferred    Lab Results   Component Value Date    BUN 13 01/24/2023    CREATININE 0.7 01/24/2023    WBC 8.08 01/24/2023    HGB 12.5 01/24/2023    HCT 39.6 01/24/2023     01/24/2023    AST 26 01/24/2023    ALT 29 01/24/2023    ALKPHOS 67 01/24/2023    ALBUMIN 3.5 01/24/2023    HGBA1C 5.4 01/24/2023        Imaging:  I have personally reviewed the below imaging and discussed my findings with the patient.     No results found for this or any previous visit (from the past 2160 hour(s)).  No results found for this or any previous visit (from the past 2160 hour(s)).      ASSESSMENT     1. History of pyelonephritis    2. Pyelonephritis        PLAN:     Recurrent UTI     - repeat renal ultrasound  - repeat urine culture  - patient without any additional risk factors, will defer cystoscopy for now    Charles Frye MD  Urology  [facility]    Disclaimer: This note has been generated using voice-recognition software. There may be typographical errors that have been missed during proof-reading.

## 2023-09-12 LAB — BACTERIA UR CULT: NO GROWTH

## 2023-09-26 ENCOUNTER — HOSPITAL ENCOUNTER (OUTPATIENT)
Dept: RADIOLOGY | Facility: HOSPITAL | Age: 38
Discharge: HOME | End: 2023-09-26
Attending: STUDENT IN AN ORGANIZED HEALTH CARE EDUCATION/TRAINING PROGRAM
Payer: COMMERCIAL

## 2023-09-26 DIAGNOSIS — Z87.448 HISTORY OF PYELONEPHRITIS: ICD-10-CM

## 2023-09-26 PROCEDURE — 76770 US EXAM ABDO BACK WALL COMP: CPT | Mod: 26,,, | Performed by: STUDENT IN AN ORGANIZED HEALTH CARE EDUCATION/TRAINING PROGRAM

## 2023-09-26 PROCEDURE — 76770 US EXAM ABDO BACK WALL COMP: CPT | Mod: TC

## 2023-09-26 PROCEDURE — 76770 US RETROPERITONEAL COMPLETE: ICD-10-PCS | Mod: 26,,, | Performed by: STUDENT IN AN ORGANIZED HEALTH CARE EDUCATION/TRAINING PROGRAM

## 2023-12-11 PROBLEM — N12 PYELONEPHRITIS: Status: RESOLVED | Noted: 2023-09-11 | Resolved: 2023-12-11

## 2023-12-18 ENCOUNTER — OFFICE VISIT (OUTPATIENT)
Dept: URGENT CARE | Facility: CLINIC | Age: 38
End: 2023-12-18
Payer: COMMERCIAL

## 2023-12-18 VITALS
TEMPERATURE: 98 F | HEIGHT: 60 IN | DIASTOLIC BLOOD PRESSURE: 76 MMHG | RESPIRATION RATE: 19 BRPM | HEART RATE: 76 BPM | OXYGEN SATURATION: 96 % | SYSTOLIC BLOOD PRESSURE: 105 MMHG | WEIGHT: 150 LBS | BODY MASS INDEX: 29.45 KG/M2

## 2023-12-18 DIAGNOSIS — R30.0 DYSURIA: ICD-10-CM

## 2023-12-18 DIAGNOSIS — N30.00 ACUTE CYSTITIS WITHOUT HEMATURIA: Primary | ICD-10-CM

## 2023-12-18 LAB
BILIRUB UR QL STRIP: POSITIVE
GLUCOSE UR QL STRIP: NEGATIVE
KETONES UR QL STRIP: NEGATIVE
LEUKOCYTE ESTERASE UR QL STRIP: POSITIVE
PH, POC UA: 6 (ref 5–8)
POC BLOOD, URINE: NEGATIVE
POC NITRATES, URINE: NEGATIVE
PROT UR QL STRIP: POSITIVE
SP GR UR STRIP: 1.01 (ref 1–1.03)
UROBILINOGEN UR STRIP-ACNC: 1 (ref 0.1–1.1)

## 2023-12-18 PROCEDURE — 81003 POCT URINALYSIS, DIPSTICK, AUTOMATED, W/O SCOPE: ICD-10-PCS | Mod: QW,S$GLB,, | Performed by: SURGERY

## 2023-12-18 PROCEDURE — 99213 PR OFFICE/OUTPT VISIT, EST, LEVL III, 20-29 MIN: ICD-10-PCS | Mod: S$GLB,,, | Performed by: SURGERY

## 2023-12-18 PROCEDURE — 99213 OFFICE O/P EST LOW 20 MIN: CPT | Mod: S$GLB,,, | Performed by: SURGERY

## 2023-12-18 PROCEDURE — 81003 URINALYSIS AUTO W/O SCOPE: CPT | Mod: QW,S$GLB,, | Performed by: SURGERY

## 2023-12-18 RX ORDER — NITROFURANTOIN 25; 75 MG/1; MG/1
100 CAPSULE ORAL 2 TIMES DAILY
Qty: 10 CAPSULE | Refills: 0 | Status: SHIPPED | OUTPATIENT
Start: 2023-12-18 | End: 2023-12-23

## 2023-12-18 RX ORDER — PHENAZOPYRIDINE HYDROCHLORIDE 200 MG/1
200 TABLET, FILM COATED ORAL 3 TIMES DAILY PRN
Qty: 6 TABLET | Refills: 0 | Status: SHIPPED | OUTPATIENT
Start: 2023-12-18 | End: 2023-12-20

## 2023-12-19 NOTE — PROGRESS NOTES
Subjective:      Patient ID: Luz Muñiz is a 38 y.o. female.    Vitals:  height is 5' (1.524 m) and weight is 68 kg (150 lb). Her temperature is 98.2 °F (36.8 °C). Her blood pressure is 105/76 and her pulse is 76. Her respiration is 19 and oxygen saturation is 96%.     Chief Complaint: Dysuria    Pt. C/o ongoing frequency, back pain X 1 day    Dysuria   This is a new problem. The current episode started today. The problem has been gradually worsening. The quality of the pain is described as burning. The pain is at a severity of 4/10. The pain is mild. There has been no fever. She is Not sexually active. There is No history of pyelonephritis. Associated symptoms include frequency and urgency. Pertinent negatives include no behavior changes, chills, discharge, flank pain, hematuria, hesitancy, nausea, possible pregnancy, vomiting, weight loss, bubble bath use, constipation, rash or withholding. She has tried NSAIDs and home medications for the symptoms. The treatment provided no relief. Her past medical history is significant for recurrent UTIs. There is no history of catheterization, diabetes insipidus, diabetes mellitus, genitourinary reflux, hypertension, kidney stones, a single kidney, STD, urinary stasis or a urological procedure.       Constitution: Negative for chills.   Gastrointestinal:  Negative for nausea, vomiting and constipation.   Genitourinary:  Positive for dysuria, frequency and urgency. Negative for flank pain and hematuria.   Skin:  Negative for rash.      Objective:     Physical Exam   Constitutional: She is oriented to person, place, and time. She appears well-developed.   HENT:   Head: Normocephalic and atraumatic.   Ears:   Right Ear: External ear normal.   Left Ear: External ear normal.   Nose: Nose normal. No nasal deformity. No epistaxis.   Mouth/Throat: Oropharynx is clear and moist and mucous membranes are normal.   Eyes: Lids are normal.   Neck: Trachea normal and phonation  normal. Neck supple.   Cardiovascular: Normal pulses.   Pulmonary/Chest: Effort normal.   Abdominal: Normal appearance and bowel sounds are normal. She exhibits no distension. Soft. There is no abdominal tenderness.   Neurological: She is alert and oriented to person, place, and time.   Skin: Skin is warm, dry and intact.   Psychiatric: Her speech is normal and behavior is normal.   Nursing note and vitals reviewed.      Assessment:     1. Acute cystitis without hematuria    2. Dysuria        Plan:       Acute cystitis without hematuria  -     nitrofurantoin, macrocrystal-monohydrate, (MACROBID) 100 MG capsule; Take 1 capsule (100 mg total) by mouth 2 (two) times daily. for 5 days  Dispense: 10 capsule; Refill: 0  -     phenazopyridine (PYRIDIUM) 200 MG tablet; Take 1 tablet (200 mg total) by mouth 3 (three) times daily as needed for Pain.  Dispense: 6 tablet; Refill: 0    Dysuria  -     POCT Urinalysis, Dipstick, Automated, W/O Scope

## 2023-12-27 ENCOUNTER — PATIENT MESSAGE (OUTPATIENT)
Dept: UROLOGY | Facility: CLINIC | Age: 38
End: 2023-12-27
Payer: COMMERCIAL

## 2023-12-27 RX ORDER — CIPROFLOXACIN 500 MG/1
500 TABLET ORAL 2 TIMES DAILY
Qty: 10 TABLET | Refills: 0 | Status: SHIPPED | OUTPATIENT
Start: 2023-12-27 | End: 2024-01-01

## 2024-01-09 ENCOUNTER — TELEPHONE (OUTPATIENT)
Dept: INTERNAL MEDICINE | Facility: CLINIC | Age: 39
End: 2024-01-09
Payer: COMMERCIAL

## 2024-01-09 NOTE — TELEPHONE ENCOUNTER
Forwarded this to the provider to see if he would accept before I schedule anything with her or talk to her.

## 2024-01-09 NOTE — TELEPHONE ENCOUNTER
----- Message from Miriam Salazar sent at 1/9/2024  3:59 PM CST -----  Contact: Pt  [phone]  Would like to receive medical advice.  Would they like a call back or a response via [facility]:  Call Back / Portal  Additional information:      Pt is calling to see if she can be scheduled with the provider. She was advised that he is not accepting new pts but was recommended to schedule by a current pt. She says she has a specific condition and is looking for a particular provider.

## 2024-01-19 ENCOUNTER — PATIENT MESSAGE (OUTPATIENT)
Dept: UROLOGY | Facility: CLINIC | Age: 39
End: 2024-01-19
Payer: COMMERCIAL

## 2024-01-20 ENCOUNTER — PATIENT MESSAGE (OUTPATIENT)
Dept: INTERNAL MEDICINE | Facility: CLINIC | Age: 39
End: 2024-01-20
Payer: COMMERCIAL

## 2024-01-23 RX ORDER — ESCITALOPRAM OXALATE 20 MG/1
20 TABLET ORAL DAILY
Qty: 90 TABLET | Refills: 0 | Status: SHIPPED | OUTPATIENT
Start: 2024-01-23 | End: 2024-04-15 | Stop reason: SDUPTHER

## 2024-01-23 RX ORDER — ESOMEPRAZOLE MAGNESIUM 40 MG/1
40 CAPSULE, DELAYED RELEASE ORAL
Qty: 90 CAPSULE | Refills: 0 | Status: SHIPPED | OUTPATIENT
Start: 2024-01-23 | End: 2024-04-22 | Stop reason: SDUPTHER

## 2024-02-06 ENCOUNTER — PATIENT MESSAGE (OUTPATIENT)
Dept: INTERNAL MEDICINE | Facility: CLINIC | Age: 39
End: 2024-02-06
Payer: COMMERCIAL

## 2024-02-06 DIAGNOSIS — J30.2 SEASONAL ALLERGIES: ICD-10-CM

## 2024-02-06 RX ORDER — CETIRIZINE HYDROCHLORIDE 10 MG/1
10 TABLET ORAL
Qty: 90 TABLET | Refills: 3 | OUTPATIENT
Start: 2024-02-06

## 2024-02-07 RX ORDER — CETIRIZINE HYDROCHLORIDE 10 MG/1
10 TABLET ORAL 2 TIMES DAILY
Qty: 180 TABLET | Refills: 3 | Status: SHIPPED | OUTPATIENT
Start: 2024-02-07 | End: 2025-02-06

## 2024-02-09 DIAGNOSIS — Z30.41 SURVEILLANCE FOR BIRTH CONTROL, ORAL CONTRACEPTIVES: ICD-10-CM

## 2024-02-09 RX ORDER — DESOGESTREL AND ETHINYL ESTRADIOL AND ETHINYL ESTRADIOL 21-5 (28)
1 KIT ORAL
Qty: 84 TABLET | Refills: 0 | Status: SHIPPED | OUTPATIENT
Start: 2024-02-09 | End: 2024-05-09

## 2024-02-09 NOTE — TELEPHONE ENCOUNTER
Refill Decision Note   Luz Muñiz  is requesting a refill authorization.  Brief Assessment and Rationale for Refill:  Approve     Medication Therapy Plan:         Pharmacist review requested: Yes   Comments:     Note composed:12:07 PM 02/09/2024

## 2024-02-09 NOTE — TELEPHONE ENCOUNTER
Refill Routing Note   Medication(s) are not appropriate for processing by [facility] for the following reason(s):        Drug-disease interaction    [facility] action(s):  Defer        Medication Therapy Plan: FOVS in 2 weeks; Drug-Disease: VIORELE (28) and Mixed hyperlipidemia    Pharmacist review requested: Yes     Appointments  past 12m or future 3m with PCP    Date Provider   Last Visit   2/6/2023 Ifeoma Gordon MD   Next Visit   2/29/2024 Ifeoma Gordon MD   ED visits in past 90 days: 0        Note composed:7:01 AM 02/09/2024

## 2024-03-13 ENCOUNTER — PATIENT MESSAGE (OUTPATIENT)
Dept: OBSTETRICS AND GYNECOLOGY | Facility: CLINIC | Age: 39
End: 2024-03-13

## 2024-04-15 ENCOUNTER — OFFICE VISIT (OUTPATIENT)
Dept: PRIMARY CARE CLINIC | Facility: CLINIC | Age: 39
End: 2024-04-15
Payer: COMMERCIAL

## 2024-04-15 ENCOUNTER — PATIENT MESSAGE (OUTPATIENT)
Dept: PRIMARY CARE CLINIC | Facility: CLINIC | Age: 39
End: 2024-04-15

## 2024-04-15 VITALS
SYSTOLIC BLOOD PRESSURE: 110 MMHG | WEIGHT: 159.63 LBS | HEART RATE: 94 BPM | OXYGEN SATURATION: 98 % | DIASTOLIC BLOOD PRESSURE: 70 MMHG | HEIGHT: 60 IN | BODY MASS INDEX: 31.34 KG/M2

## 2024-04-15 DIAGNOSIS — Z00.00 WELLNESS EXAMINATION: Primary | ICD-10-CM

## 2024-04-15 DIAGNOSIS — F41.1 GENERALIZED ANXIETY DISORDER: ICD-10-CM

## 2024-04-15 DIAGNOSIS — Q79.60 EHLERS-DANLOS SYNDROME: ICD-10-CM

## 2024-04-15 DIAGNOSIS — E78.2 MIXED HYPERLIPIDEMIA: ICD-10-CM

## 2024-04-15 DIAGNOSIS — E61.1 IRON DEFICIENCY: ICD-10-CM

## 2024-04-15 DIAGNOSIS — J30.2 SEASONAL ALLERGIES: ICD-10-CM

## 2024-04-15 PROCEDURE — 99999 PR PBB SHADOW E&M-EST. PATIENT-LVL IV: CPT | Mod: PBBFAC,,, | Performed by: INTERNAL MEDICINE

## 2024-04-15 PROCEDURE — 3074F SYST BP LT 130 MM HG: CPT | Mod: CPTII,S$GLB,, | Performed by: INTERNAL MEDICINE

## 2024-04-15 PROCEDURE — 99395 PREV VISIT EST AGE 18-39: CPT | Mod: S$GLB,,, | Performed by: INTERNAL MEDICINE

## 2024-04-15 PROCEDURE — 3008F BODY MASS INDEX DOCD: CPT | Mod: CPTII,S$GLB,, | Performed by: INTERNAL MEDICINE

## 2024-04-15 PROCEDURE — 3078F DIAST BP <80 MM HG: CPT | Mod: CPTII,S$GLB,, | Performed by: INTERNAL MEDICINE

## 2024-04-15 PROCEDURE — 1159F MED LIST DOCD IN RCRD: CPT | Mod: CPTII,S$GLB,, | Performed by: INTERNAL MEDICINE

## 2024-04-15 RX ORDER — FLUTICASONE PROPIONATE 50 MCG
1 SPRAY, SUSPENSION (ML) NASAL 2 TIMES DAILY
Qty: 16 G | Refills: 1 | Status: SHIPPED | OUTPATIENT
Start: 2024-04-15 | End: 2024-06-13

## 2024-04-15 RX ORDER — ROSUVASTATIN CALCIUM 10 MG/1
10 TABLET, COATED ORAL DAILY
Qty: 90 TABLET | Refills: 3 | Status: SHIPPED | OUTPATIENT
Start: 2024-04-15

## 2024-04-15 RX ORDER — AZELASTINE 1 MG/ML
1 SPRAY, METERED NASAL 2 TIMES DAILY
Qty: 30 ML | Refills: 5 | Status: SHIPPED | OUTPATIENT
Start: 2024-04-15

## 2024-04-15 RX ORDER — ESCITALOPRAM OXALATE 20 MG/1
20 TABLET ORAL DAILY
Qty: 90 TABLET | Refills: 0 | Status: SHIPPED | OUTPATIENT
Start: 2024-04-15 | End: 2025-04-15

## 2024-04-15 NOTE — TELEPHONE ENCOUNTER
No care due was identified.  [facility] Embedded Care Due Messages. Reference number: [id number].   4/15/2024 12:37:52 PM CDT

## 2024-04-15 NOTE — PROGRESS NOTES
[facility] Note    Chief Complaint      Chief Complaint   Patient presents with    Establish Care     History of Present Illness      Luz Muñiz is a 38 y.o. female who presents today for chief complaint annual wellness and est care. Patient is new to me.    PCP: Ruba Silva MD  Patient comes to appointment alone.     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 Pain mgmt integrated pain and neurosciene Mahendra Gómez, she has chronc MSK chest pain - she is getting injections which have hepled   She is on 600 belbuca (buphenorphine) and qhs vicodin and   Lexapro for VALERIO and MDD, mostly VALERIO now, feels well onthis medication   Migraines she uses nurtex via pain mgmt she uses prn   HRT  [facility] for allergies and sinuses she is on flonase and astelin, she is seeing allergist for uritcaria - Dr hale testing for [facility]    Cards Janet for hx of CP   GI has seen Jon , she is on exium and levsuin, she has had a cscope     Pap: 2.23 Roberie   Colonoscopy: as above   Tobacco:   Other MDs:  I personally reviewed all past medical, surgical, social and family history.      Active Problem List with Overview Notes    Diagnosis Date Noted    Portia-Danlos syndrome 09/29/2021    Chronic pain syndrome 09/29/2021    Lumbar radiculopathy 09/29/2021    Myofascial pain syndrome 09/29/2021    Iron deficiency 05/26/2021    Hypermobile joints 06/15/2020    Arthralgia of both knees 06/15/2020    Encounter for monitoring long-term proton pump inhibitor therapy 03/16/2017    Seasonal allergies 06/10/2015    Gastroesophageal reflux disease with esophagitis: see EGD 2015 06/10/2015    Mixed hyperlipidemia 06/10/2015       Health Maintenance   Topic Date Due    TETANUS VACCINE  02/22/2027    Colonoscopy  05/26/2028    Hepatitis C  Screening  Completed    Lipid Panel  Completed       Past Medical History:   Diagnosis Date    Arthritis     EDS (Portia-Danlos syndrome)     GERD (gastroesophageal reflux disease)     Hyperlipidemia     Seasonal allergies 6/10/2015       Past Surgical History:   Procedure Laterality Date    ABLATION  02/2024    COLONOSCOPY N/A 05/26/2021    Procedure: COLONOSCOPY;  Surgeon: Cornelio Webb MD;  [facility]  Service: Endoscopy;  Laterality: N/A;  Covid test at [place] on 5/23    ESOPHAGEAL MANOMETRY WITH MEASUREMENT OF IMPEDANCE N/A 12/23/2019    Procedure: MANOMETRY, ESOPHAGUS, WITH IMPEDANCE MEASUREMENT;  Surgeon: Bradly Germain MD;  [facility]  Service: Endoscopy;  Laterality: N/A;  12/17 - pt confirmed    ESOPHAGOGASTRODUODENOSCOPY N/A 05/26/2021    Procedure: EGD (ESOPHAGOGASTRODUODENOSCOPY);  Surgeon: Cornelio Webb MD;  Location: [facility] ([address] [place] [address]);  Service: Endoscopy;  Laterality: N/A;    TONSILLECTOMY  01/01/2014    WRIST SURGERY      Ligament repair of Right wrist x 2       family history includes Cancer in her maternal grandmother; Heart disease in her father; Hyperlipidemia in her father and mother; No Known Problems in her brother.    Social History     Tobacco Use    Smoking status: Never    Smokeless tobacco: Never   Substance Use Topics    Alcohol use: Yes     Alcohol/week: 1.0 standard drink of alcohol     Types: 1 Glasses of wine per week     Comment: socially    Drug use: No       Review of Systems   Constitutional:  Negative for chills, fever, malaise/fatigue and weight loss.   Respiratory:  Negative for cough, sputum production, shortness of breath and wheezing.    Cardiovascular:  Negative for chest pain, palpitations, orthopnea and leg swelling.   Gastrointestinal:  Negative for constipation, diarrhea, nausea and vomiting.   Genitourinary:  Negative for dysuria, frequency, hematuria and urgency.        Outpatient Encounter Medications as of  "4/15/2024   Medication Sig Note Dispense Refill    b complex vitamins tablet Take 1 tablet by mouth once daily.       BD ALLERGIST TRAY REG BEVEL 1 mL 27 x 1/2" Syrg USE ONCE WEEKLY AS DIRECTED       BELBUCA 600 mcg Film SMARTSI Strip(s) buccal Every 12 Hours       CALCIUM ORAL Take by mouth.       cetirizine (ZYRTEC) 10 MG tablet Take 1 tablet (10 mg total) by mouth 2 (two) times a day.  180 tablet 3    esomeprazole (NEXIUM) 40 MG capsule Take 1 capsule (40 mg total) by mouth before breakfast.  90 capsule 0    etodolac (LODINE) 400 MG tablet Take 1 tablet (400 mg total) by mouth every 6 (six) hours as needed.  60 tablet 0    ferrous sulfate (FEOSOL) 325 mg (65 mg iron) Tab tablet Take 1 tablet (325 mg total) by mouth every 12 (twelve) hours. (Patient taking differently: Take 325 mg by mouth once daily.)  60 tablet 4    hyoscyamine (LEVSIN/SL) 0.125 mg Subl PLEASE SEE ATTACHED FOR DETAILED DIRECTIONS  60 tablet 0    Lactobac no.41/Bifidobact no.7 (PROBIOTIC-10 ORAL) Take by mouth once daily.       metaxalone (SKELAXIN) 800 MG tablet nightly.       ondansetron (ZOFRAN) 4 MG tablet Take 1 tablet (4 mg total) by mouth every 8 (eight) hours as needed for Nausea.  30 tablet 0    oxyCODONE-acetaminophen (PERCOCET)  mg per tablet Take 1-2 tablets by mouth daily as needed.       psyllium husk (METAMUCIL ORAL) Take 1 tablet by mouth once daily.       rimegepant (NURTEC) 75 mg odt Nurtec ODT 75 mg disintegrating tablet       rosuvastatin (CRESTOR) 10 MG tablet Take 1 tablet (10 mg total) by mouth once daily.  90 tablet 3    VIORELE, 28, 0.15-0.02 mgx21 /0.01 mg x 5 per tablet TAKE 1 TABLET BY MOUTH EVERY DAY  84 tablet 0    [DISCONTINUED] azelastine (ASTELIN) 137 mcg (0.1 %) nasal spray 1 spray by Nasal route 2 (two) times daily. 2017: Received from: External Pharmacy Received Sig: TAKE 1-2 SPRAYS IN [facility] NOSTRIL TWICE DAILY  5    [DISCONTINUED] EScitalopram oxalate (LEXAPRO) 20 MG tablet Take 1 tablet (20 mg " total) by mouth once daily.  90 tablet 0    [DISCONTINUED] fluticasone propionate (FLONASE) 50 mcg/actuation nasal spray 1 spray by Each Nostril route 2 (two) times daily.       azelastine (ASTELIN) 137 mcg (0.1 %) nasal spray 1 spray (137 mcg total) by Nasal route 2 (two) times daily.  30 mL 5    diclofenac sodium (VOLTAREN) 1 % Gel diclofenac 1 % topical gel (Patient not taking: Reported on 4/15/2024)       EScitalopram oxalate (LEXAPRO) 20 MG tablet Take 1 tablet (20 mg total) by mouth once daily.  90 tablet 0    fluticasone propionate (FLONASE) 50 mcg/actuation nasal spray 1 spray (50 mcg total) by Each Nostril route 2 (two) times daily.  16 g 1     No facility-administered encounter medications on file as of 4/15/2024.        Review of patient's allergies indicates:   Allergen Reactions    Sulfa (sulfonamide antibiotics)     Codeine Rash    Sulfamethoxazole-trimethoprim Nausea And Vomiting and Other (See Comments)           Physical Exam      Vital Signs  Pulse: 94  SpO2: 98 %  BP: 110/70  BP Location: Right arm  Patient Position: Sitting  Height and Weight  Height: 5' (152.4 cm)  Weight: 72.4 kg (159 lb 9.8 oz)  BSA (Calculated - sq m): 1.75 sq meters  BMI (Calculated): 31.2  Weight in (lb) to have BMI = 25: 127.7]    Physical Exam  Vitals reviewed.   Constitutional:       General: She is not in acute distress.     Appearance: She is well-developed. She is not diaphoretic.   HENT:      Head: Normocephalic and atraumatic.      Right Ear: External ear normal.      Left Ear: External ear normal.      Nose: Nose normal.   Eyes:      General:         Right eye: No discharge.         Left eye: No discharge.      Conjunctiva/sclera: Conjunctivae normal.   Cardiovascular:      Rate and Rhythm: Regular rhythm.      Heart sounds: Normal heart sounds.   Pulmonary:      Effort: Pulmonary effort is normal. No respiratory distress.      Breath sounds: Normal breath sounds.   Musculoskeletal:         General: Normal range of  "motion.      Cervical back: Normal range of motion.   Skin:     Coloration: Skin is not pale.      Findings: No rash.   Neurological:      Mental Status: She is alert and oriented to person, place, and time.   Psychiatric:         Behavior: Behavior normal.         Thought Content: Thought content normal.          Laboratory:  CBC:  No results for input(s): "WBC", "RBC", "HGB", "HCT", "PLT", "MCV", "MCH", "MCHC" in the last 2160 hours.  CMP:  No results for input(s): "GLU", "CALCIUM", "ALBUMIN", "PROT", "NA", "K", "CO2", "CL", "BUN", "ALKPHOS", "ALT", "AST", "BILITOT" in the last 2160 hours.    Invalid input(s): "CREATININ"  URINALYSIS:  No results for input(s): "COLORU", "CLARITYU", "SPECGRAV", "PHUR", "PROTEINUA", "GLUCOSEU", "BILIRUBINCON", "BLOODU", "WBCU", "RBCU", "BACTERIA", "MUCUS", "NITRITE", "LEUKOCYTESUR", "UROBILINOGEN", "HYALINECASTS" in the last 2160 hours.   LIPIDS:  No results for input(s): "TSH", "HDL", "CHOL", "TRIG", "LDLCALC", "CHOLHDL", "NONHDLCHOL", "TOTALCHOLEST" in the last 2160 hours.  TSH:  No results for input(s): "TSH" in the last 2160 hours.  A1C:  No results for input(s): "HGBA1C" in the last 2160 hours.        Assessment/Plan     Luz Muñiz is a 38 y.o.female with:    1. Wellness examination  -     Lipid Panel; Future; Expected date: 04/15/2024  -     Comprehensive Metabolic Panel; Future; Expected date: 04/15/2024  -     CBC Without Differential; Future; Expected date: 04/15/2024    2. Seasonal allergies  -     azelastine (ASTELIN) 137 mcg (0.1 %) nasal spray; 1 spray (137 mcg total) by Nasal route 2 (two) times daily.  Dispense: 30 mL; Refill: 5  -     fluticasone propionate (FLONASE) 50 mcg/actuation nasal spray; 1 spray (50 mcg total) by Each Nostril route 2 (two) times daily.  Dispense: 16 g; Refill: 1    3. Generalized anxiety disorder  -     EScitalopram oxalate (LEXAPRO) 20 MG tablet; Take 1 tablet (20 mg total) by mouth once daily.  Dispense: 90 tablet; Refill: 0    4. " Portia-Danlos syndrome  Assessment & Plan:  Per pain management       5. Iron deficiency  -     Iron and TIBC; Future; Expected date: 04/15/2024  -     Ferritin; Future; Expected date: 04/15/2024         Use of the [facility] Patient Portal discussed and encouraged during today's visit  -Continue current medications and maintain follow up with specialists.  Return to clinic in .  Future Appointments   Date Time Provider Department Center   4/15/2024 12:20 PM [facility], [facility] [place]   4/17/2024 10:15 AM Ifeoma Gordon MD [facility] Andrey Silva MD  4/15/2024 11:48 AM    [facility] Internal [facility]

## 2024-04-16 ENCOUNTER — PATIENT MESSAGE (OUTPATIENT)
Dept: OBSTETRICS AND GYNECOLOGY | Facility: CLINIC | Age: 39
End: 2024-04-16
Payer: COMMERCIAL

## 2024-04-22 ENCOUNTER — PATIENT MESSAGE (OUTPATIENT)
Dept: PRIMARY CARE CLINIC | Facility: CLINIC | Age: 39
End: 2024-04-22
Payer: COMMERCIAL

## 2024-04-22 RX ORDER — ESOMEPRAZOLE MAGNESIUM 40 MG/1
40 CAPSULE, DELAYED RELEASE ORAL
Qty: 90 CAPSULE | Refills: 0 | Status: SHIPPED | OUTPATIENT
Start: 2024-04-22

## 2024-04-22 NOTE — TELEPHONE ENCOUNTER
Care Due:                  Date            Visit Type   Department     Provider  --------------------------------------------------------------------------------                                NP -                              PRIMARY      [facility]  Last Visit: 04-      CARE ([facility])   CARE           Ruba Silva  Next Visit: None Scheduled  None         None Found                                                            Last  Test          Frequency    Reason                     Performed    Due Date  --------------------------------------------------------------------------------    CMP.........  12 months..  rosuvastatin.............  01- 01-    Lipid Panel.  12 months..  rosuvastatin.............  01- 01-    [employer] Embedded Care Due Messages. Reference number: [id number].   4/22/2024 4:16:11 PM CDT

## 2024-04-26 ENCOUNTER — LAB VISIT (OUTPATIENT)
Dept: LAB | Facility: HOSPITAL | Age: 39
End: 2024-04-26
Attending: INTERNAL MEDICINE
Payer: COMMERCIAL

## 2024-04-26 DIAGNOSIS — Z00.00 WELLNESS EXAMINATION: ICD-10-CM

## 2024-04-26 LAB
ALBUMIN SERPL BCP-MCNC: 3.3 G/DL (ref 3.5–5.2)
ALP SERPL-CCNC: 59 U/L (ref 55–135)
ALT SERPL W/O P-5'-P-CCNC: 22 U/L (ref 10–44)
ANION GAP SERPL CALC-SCNC: 6 MMOL/L (ref 8–16)
AST SERPL-CCNC: 18 U/L (ref 10–40)
BILIRUB SERPL-MCNC: 0.3 MG/DL (ref 0.1–1)
BUN SERPL-MCNC: 15 MG/DL (ref 6–20)
CALCIUM SERPL-MCNC: 9.1 MG/DL (ref 8.7–10.5)
CHLORIDE SERPL-SCNC: 109 MMOL/L (ref 95–110)
CHOLEST SERPL-MCNC: 201 MG/DL (ref 120–199)
CHOLEST/HDLC SERPL: 3.3 {RATIO} (ref 2–5)
CO2 SERPL-SCNC: 24 MMOL/L (ref 23–29)
CREAT SERPL-MCNC: 0.7 MG/DL (ref 0.5–1.4)
ERYTHROCYTE [DISTWIDTH] IN BLOOD BY AUTOMATED COUNT: 12.4 % (ref 11.5–14.5)
EST. GFR  (NO RACE VARIABLE): >60 ML/MIN/1.73 M^2
GLUCOSE SERPL-MCNC: 86 MG/DL (ref 70–110)
HCT VFR BLD AUTO: 40.5 % (ref 37–48.5)
HDLC SERPL-MCNC: 61 MG/DL (ref 40–75)
HDLC SERPL: 30.3 % (ref 20–50)
HGB BLD-MCNC: 13.2 G/DL (ref 12–16)
LDLC SERPL CALC-MCNC: 112 MG/DL (ref 63–159)
MCH RBC QN AUTO: 29.7 PG (ref 27–31)
MCHC RBC AUTO-ENTMCNC: 32.6 G/DL (ref 32–36)
MCV RBC AUTO: 91 FL (ref 82–98)
NONHDLC SERPL-MCNC: 140 MG/DL
PLATELET # BLD AUTO: 323 K/UL (ref 150–450)
PMV BLD AUTO: 9.1 FL (ref 9.2–12.9)
POTASSIUM SERPL-SCNC: 4.1 MMOL/L (ref 3.5–5.1)
PROT SERPL-MCNC: 6.8 G/DL (ref 6–8.4)
RBC # BLD AUTO: 4.45 M/UL (ref 4–5.4)
SODIUM SERPL-SCNC: 139 MMOL/L (ref 136–145)
TRIGL SERPL-MCNC: 140 MG/DL (ref 30–150)
WBC # BLD AUTO: 8.23 K/UL (ref 3.9–12.7)

## 2024-04-26 PROCEDURE — 85027 COMPLETE CBC AUTOMATED: CPT | Performed by: INTERNAL MEDICINE

## 2024-04-26 PROCEDURE — 36415 COLL VENOUS BLD VENIPUNCTURE: CPT | Performed by: INTERNAL MEDICINE

## 2024-04-26 PROCEDURE — 80061 LIPID PANEL: CPT | Performed by: INTERNAL MEDICINE

## 2024-04-26 PROCEDURE — 80053 COMPREHEN METABOLIC PANEL: CPT | Performed by: INTERNAL MEDICINE

## 2024-05-02 ENCOUNTER — PATIENT MESSAGE (OUTPATIENT)
Dept: PRIMARY CARE CLINIC | Facility: CLINIC | Age: 39
End: 2024-05-02
Payer: COMMERCIAL

## 2024-05-09 DIAGNOSIS — Z30.41 SURVEILLANCE FOR BIRTH CONTROL, ORAL CONTRACEPTIVES: ICD-10-CM

## 2024-05-09 RX ORDER — DESOGESTREL AND ETHINYL ESTRADIOL AND ETHINYL ESTRADIOL 21-5 (28)
1 KIT ORAL
Qty: 84 TABLET | Refills: 0 | Status: SHIPPED | OUTPATIENT
Start: 2024-05-09 | End: 2024-06-11 | Stop reason: SDUPTHER

## 2024-05-09 NOTE — TELEPHONE ENCOUNTER
Refill Routing Note   Medication(s) are not appropriate for processing by [facility] for the following reason(s):        Patient not seen by provider within 15 months    [facility] action(s):  Defer        Medication Therapy Plan: FOVS in 1 month      Appointments  past 12m or future 3m with PCP    Date Provider   Last Visit   2/6/2023 Ifeoma Gordon MD   Next Visit   6/11/2024 Ifeoma Gordon MD   ED visits in past 90 days: 0        Note composed:8:38 AM 05/09/2024

## 2024-06-11 ENCOUNTER — OFFICE VISIT (OUTPATIENT)
Dept: OBSTETRICS AND GYNECOLOGY | Facility: CLINIC | Age: 39
End: 2024-06-11
Attending: OBSTETRICS & GYNECOLOGY
Payer: COMMERCIAL

## 2024-06-11 VITALS
SYSTOLIC BLOOD PRESSURE: 113 MMHG | HEIGHT: 60 IN | DIASTOLIC BLOOD PRESSURE: 73 MMHG | WEIGHT: 161 LBS | BODY MASS INDEX: 31.61 KG/M2

## 2024-06-11 DIAGNOSIS — Z01.419 ENCOUNTER FOR GYNECOLOGICAL EXAMINATION WITHOUT ABNORMAL FINDING: Primary | ICD-10-CM

## 2024-06-11 DIAGNOSIS — Z30.41 SURVEILLANCE FOR BIRTH CONTROL, ORAL CONTRACEPTIVES: ICD-10-CM

## 2024-06-11 PROCEDURE — 3008F BODY MASS INDEX DOCD: CPT | Mod: CPTII,S$GLB,, | Performed by: OBSTETRICS & GYNECOLOGY

## 2024-06-11 PROCEDURE — 3078F DIAST BP <80 MM HG: CPT | Mod: CPTII,S$GLB,, | Performed by: OBSTETRICS & GYNECOLOGY

## 2024-06-11 PROCEDURE — 1160F RVW MEDS BY RX/DR IN RCRD: CPT | Mod: CPTII,S$GLB,, | Performed by: OBSTETRICS & GYNECOLOGY

## 2024-06-11 PROCEDURE — 1159F MED LIST DOCD IN RCRD: CPT | Mod: CPTII,S$GLB,, | Performed by: OBSTETRICS & GYNECOLOGY

## 2024-06-11 PROCEDURE — 99395 PREV VISIT EST AGE 18-39: CPT | Mod: S$GLB,,, | Performed by: OBSTETRICS & GYNECOLOGY

## 2024-06-11 PROCEDURE — 3074F SYST BP LT 130 MM HG: CPT | Mod: CPTII,S$GLB,, | Performed by: OBSTETRICS & GYNECOLOGY

## 2024-06-11 PROCEDURE — 99999 PR PBB SHADOW E&M-EST. PATIENT-LVL IV: CPT | Mod: PBBFAC,,, | Performed by: OBSTETRICS & GYNECOLOGY

## 2024-06-11 RX ORDER — HYDROCODONE BITARTRATE AND ACETAMINOPHEN 10; 325 MG/1; MG/1
1 TABLET ORAL EVERY 12 HOURS PRN
COMMUNITY

## 2024-06-11 RX ORDER — DESOGESTREL AND ETHINYL ESTRADIOL 21-5 (28)
1 KIT ORAL DAILY
Qty: 84 TABLET | Refills: 3 | Status: SHIPPED | OUTPATIENT
Start: 2024-06-11

## 2024-06-11 RX ORDER — METHOCARBAMOL 750 MG/1
750 TABLET, FILM COATED ORAL EVERY 8 HOURS PRN
COMMUNITY

## 2024-06-11 NOTE — PROGRESS NOTES
Subjective:       Patient ID: Luz Muñiz is a 38 y.o. female.    Chief Complaint:  Annual Exam and Gynecologic Exam      History of Present Illness  Gynecologic Exam  Pertinent negatives include no abdominal pain, back pain or headaches. There is no history of menorrhagia.       Luz Muñiz is a 38 y.o. female  here for her annual GYN exam.    She describes her periods as regular, light flow, lasting 4-5 days. (Well controlled on low dose OCP's)  denies break through bleeding.   denies vaginal itching or irritation.  Denies vaginal discharge.  She is not sexually active.  She uses abstinence, oral contraceptives (estrogen/progesterone) for contraception.   History of abnormal pap: No  Last Pap: approximate date 2023 and was normal(and negative for HR HPV)  Last MMG: NA  Last Colonoscopy:  colonoscopy a few years ago without abnormalities.(Advised to return in 5 years)  denies domestic violence. She does feel safe at home.     Past Medical History:   Diagnosis Date    Arthritis     EDS (Portia-Danlos syndrome)     GERD (gastroesophageal reflux disease)     Hyperlipidemia     Seasonal allergies 6/10/2015     Past Surgical History:   Procedure Laterality Date    ABLATION  2024    COLONOSCOPY N/A 2021    Procedure: COLONOSCOPY;  Surgeon: Cornelio Webb MD;  Location: [facility] ([address] [place] [address]);  Service: Endoscopy;  Laterality: N/A;  Covid test at [place] on     ESOPHAGEAL MANOMETRY WITH MEASUREMENT OF IMPEDANCE N/A 2019    Procedure: MANOMETRY, ESOPHAGUS, WITH IMPEDANCE MEASUREMENT;  Surgeon: Bradly Germain MD;  Location: [address]);  Service: Endoscopy;  Laterality: N/A;   - pt confirmed    ESOPHAGOGASTRODUODENOSCOPY N/A 2021    Procedure: EGD (ESOPHAGOGASTRODUODENOSCOPY);  Surgeon: Cornelio Webb MD;  Location: [facility] ([address] [place] [address]);  Service: Endoscopy;  Laterality: N/A;    TONSILLECTOMY  2014    WRIST SURGERY      Ligament repair of Right wrist  x 2     Social History     Socioeconomic History    Marital status: Single   Occupational History    Occupation:    Tobacco Use    Smoking status: Never    Smokeless tobacco: Never   Substance and Sexual Activity    Alcohol use: Yes     Alcohol/week: 1.0 standard drink of alcohol     Types: 1 Glasses of wine per week     Comment: socially    Drug use: No    Sexual activity: Not Currently     Partners: Male     Birth control/protection: OCP   Other Topics Concern    Are you pregnant or think you may be? No    Breast-feeding No     Social Determinants of Health     Financial Resource Strain: Medium Risk (6/12/2023)    Overall Financial Resource Strain (CARDIA)     Difficulty of Paying Living Expenses: Somewhat hard   Food Insecurity: Food Insecurity Present (6/12/2023)    Hunger Vital Sign     Worried About Running Out of Food in the Last Year: Sometimes true     Ran Out of Food in the Last Year: Never true   Transportation Needs: No Transportation Needs (6/12/2023)    PRAPARE - Transportation     Lack of Transportation (Medical): No     Lack of Transportation (Non-Medical): No   Physical Activity: Insufficiently Active (6/12/2023)    Exercise Vital Sign     Days of Exercise per Week: 4 days     Minutes of Exercise per Session: 30 min   Stress: Stress Concern Present (6/12/2023)    [employer] - Occupational Stress Questionnaire     Feeling of Stress : To some extent   Housing Stability: Low Risk  (6/12/2023)    Housing Stability Vital Sign     Unable to Pay for Housing in the Last Year: No     Number of Places Lived in the Last Year: 1     Unstable Housing in the Last Year: No     Family History   Problem Relation Name Age of Onset    Hyperlipidemia Father Ray     Heart disease Father Ray         valve replacement    Hyperlipidemia Mother Nicolle     Cancer Maternal Grandmother          skin    No Known Problems Brother Tres     Acne Neg Hx      Colon cancer Neg Hx       Ovarian cancer Neg Hx      Breast cancer Neg Hx      Diabetes Neg Hx      Hypertension Neg Hx      Eclampsia Neg Hx      Miscarriages / Stillbirths Neg Hx       labor Neg Hx      Stroke Neg Hx      Colon polyps Neg Hx      Liver cancer Neg Hx      Liver disease Neg Hx      Cirrhosis Neg Hx      Rectal cancer Neg Hx      Stomach cancer Neg Hx      Esophageal cancer Neg Hx      Celiac disease Neg Hx      Inflammatory bowel disease Neg Hx      Crohn's disease Neg Hx       OB History          0    Para        Term                AB        Living             SAB        IAB        Ectopic        Multiple        Live Births                     /73   Ht 5' (1.524 m)   Wt 73 kg (161 lb)   LMP 2024 (Approximate)   BMI 31.44 kg/m²         GYN & OB History  Patient's last menstrual period was 2024 (approximate).   Date of Last Pap: 2/10/2023    OB History    Para Term  AB Living   0             SAB IAB Ectopic Multiple Live Births                   Review of Systems  Review of Systems   Constitutional:  Negative for activity change, appetite change, fatigue and unexpected weight change.   HENT: Negative.     Eyes:  Negative for visual disturbance.   Respiratory:  Negative for shortness of breath and wheezing.    Cardiovascular:  Negative for chest pain, palpitations and leg swelling.   Gastrointestinal:  Negative for abdominal pain, bloating and blood in stool.   Endocrine: Negative for diabetes and hair loss.   Genitourinary:  Negative for menorrhagia and menstrual problem.   Musculoskeletal:  Negative for back pain and joint swelling.   Integumentary:  Negative for acne, hair changes and nipple discharge.   Neurological:  Negative for headaches.   Hematological:  Does not bruise/bleed easily.   Psychiatric/Behavioral:  Negative for depression and sleep disturbance. The patient is not nervous/anxious.    Breast: Negative for mastodynia and nipple discharge           Objective:      Physical Exam:   Constitutional: She is oriented to person, place, and time. She appears well-developed and well-nourished.    HENT:   Head: Normocephalic and atraumatic.    Eyes: Pupils are equal, round, and reactive to light. EOM are normal.     Cardiovascular:  Normal rate and regular rhythm.             Pulmonary/Chest: Effort normal and breath sounds normal.   BREASTS:  no mass, no tenderness, no deformity and no retraction. Right breast exhibits no inverted nipple, no mass, no nipple discharge, no skin change, no tenderness, no bleeding and no swelling. Left breast exhibits no inverted nipple, no mass, no nipple discharge, no skin change, no tenderness, no bleeding and no swelling. Breasts are symmetrical.              Abdominal: Soft. Bowel sounds are normal.     Genitourinary:    Pelvic exam was performed with patient supine.      Genitourinary Comments: PELVIC: Normal external genitalia without lesions.  Normal hair distribution.  Adequate perineal body, normal urethral meatus.  Vagina Narrow,Moderately rugated without lesions or discharge.  Vaginismus noted. Cervix pink, without lesions, discharge or tenderness.  No significant cystocele or rectocele.  Bimanual exam shows uterus to be normal size, regular, mobile and nontender.  Adnexa without masses or tenderness.                   Musculoskeletal: Normal range of motion and moves all extremeties.       Neurological: She is alert and oriented to person, place, and time.    Skin: Skin is warm and dry.    Psychiatric: She has a normal mood and affect.              Assessment:        1. Encounter for gynecological examination without abnormal finding    2. Surveillance for birth control, oral contraceptives                Plan:        Problem List Items Addressed This Visit    None  Visit Diagnoses       Encounter for gynecological examination without abnormal finding    -  Primary  COUNSELING:  The patient was counseled today on regular  weight bearing exercise. Patient was counseled today on the new ACS guidelines for cervical cytology screening as well as the current recommendations for breast cancer screening. Counseling session lasted approximately 10 minutes, and all her questions were answered. She was advised to see her primary care physician for all other health maintenance.   FOLLOW-UP with me for next routine visit.       Surveillance for birth control, oral contraceptives        Relevant Medications    desog-e.estradioL/e.estradioL (VIORELE, 28,) 0.15-0.02 mgx21 /0.01 mg x 5 per tablet             Follow up in about 1 year (around 6/11/2025).

## 2024-06-13 DIAGNOSIS — J30.2 SEASONAL ALLERGIES: ICD-10-CM

## 2024-06-13 RX ORDER — FLUTICASONE PROPIONATE 50 MCG
1 SPRAY, SUSPENSION (ML) NASAL 2 TIMES DAILY
Qty: 32 ML | Refills: 3 | Status: SHIPPED | OUTPATIENT
Start: 2024-06-13

## 2024-06-13 NOTE — TELEPHONE ENCOUNTER
No care due was identified.  [facility] Embedded Care Due Messages. Reference number: [id number].   6/13/2024 12:55:33 AM CDT

## 2024-06-13 NOTE — TELEPHONE ENCOUNTER
Refill Routing Note   Medication(s) are not appropriate for processing by [facility] for the following reason(s):        New or recently adjusted medication    [facility] action(s):  Defer               Appointments  past 12m or future 3m with PCP    Date Provider   Last Visit   4/15/2024 Ruba Silva MD   Next Visit   Visit date not found Ruba Silva MD   ED visits in past 90 days: 0        Note composed:2:37 AM 06/13/2024

## 2024-07-11 ENCOUNTER — PATIENT MESSAGE (OUTPATIENT)
Dept: PRIMARY CARE CLINIC | Facility: CLINIC | Age: 39
End: 2024-07-11
Payer: COMMERCIAL

## 2024-07-18 RX ORDER — ESOMEPRAZOLE MAGNESIUM 40 MG/1
40 CAPSULE, DELAYED RELEASE ORAL
Qty: 90 CAPSULE | Refills: 0 | Status: SHIPPED | OUTPATIENT
Start: 2024-07-18

## 2024-07-18 NOTE — TELEPHONE ENCOUNTER
Refill Routing Note   Medication(s) are not appropriate for processing by [facility] for the following reason(s):        New or recently adjusted medication: less than 90 days under the signature of responsible physician    [facility] action(s):  Defer             Appointments  past 12m or future 3m with PCP    Date Provider   Last Visit   4/15/2024 Ruba Silva MD   Next Visit   Visit date not found Ruba Silva MD   ED visits in past 90 days: 0        Note composed:10:36 AM 07/18/2024

## 2024-07-18 NOTE — TELEPHONE ENCOUNTER
No care due was identified.  [facility] Embedded Care Due Messages. Reference number: [id number].   7/18/2024 12:19:01 AM CDT

## 2024-10-13 RX ORDER — ESOMEPRAZOLE MAGNESIUM 40 MG/1
40 CAPSULE, DELAYED RELEASE ORAL
Qty: 90 CAPSULE | Refills: 1 | Status: SHIPPED | OUTPATIENT
Start: 2024-10-13

## 2024-10-13 NOTE — TELEPHONE ENCOUNTER
Refill Decision Note   Luzdanette Andrademasood  is requesting a refill authorization.  Brief Assessment and Rationale for Refill:  Approve     Medication Therapy Plan:        Comments:     Note composed:1:15 PM 10/13/2024

## 2024-11-24 DIAGNOSIS — E78.2 MIXED HYPERLIPIDEMIA: ICD-10-CM

## 2024-11-24 DIAGNOSIS — F41.1 GENERALIZED ANXIETY DISORDER: ICD-10-CM

## 2024-11-25 RX ORDER — ROSUVASTATIN CALCIUM 10 MG/1
10 TABLET, COATED ORAL DAILY
Qty: 90 TABLET | Refills: 3 | Status: SHIPPED | OUTPATIENT
Start: 2024-11-25

## 2024-11-25 RX ORDER — ESCITALOPRAM OXALATE 20 MG/1
20 TABLET ORAL DAILY
Qty: 90 TABLET | Refills: 0 | Status: SHIPPED | OUTPATIENT
Start: 2024-11-25 | End: 2025-11-25

## 2024-11-25 RX ORDER — ESOMEPRAZOLE MAGNESIUM 40 MG/1
40 CAPSULE, DELAYED RELEASE ORAL
Qty: 90 CAPSULE | Refills: 1 | Status: SHIPPED | OUTPATIENT
Start: 2024-11-25

## 2024-11-25 NOTE — TELEPHONE ENCOUNTER
No care due was identified.  [facility] Embedded Care Due Messages. Reference number: [id number].   11/24/2024 9:09:37 PM CST

## 2025-03-10 DIAGNOSIS — F41.1 GENERALIZED ANXIETY DISORDER: ICD-10-CM

## 2025-03-11 RX ORDER — ESCITALOPRAM OXALATE 20 MG/1
20 TABLET ORAL DAILY
Qty: 90 TABLET | Refills: 0 | Status: SHIPPED | OUTPATIENT
Start: 2025-03-11 | End: 2026-03-11

## 2025-04-17 ENCOUNTER — PATIENT MESSAGE (OUTPATIENT)
Dept: OBSTETRICS AND GYNECOLOGY | Facility: CLINIC | Age: 40
End: 2025-04-17
Payer: COMMERCIAL

## 2025-04-22 NOTE — TELEPHONE ENCOUNTER
Tried calling patient to discuss issues with persistent migraine for two weeks, will advise to stop OCP's with current symptoms.   In view of persistent Migraine, will need to stop combination pills due to increased possibility of thrombotic risk.

## 2025-06-09 ENCOUNTER — TELEPHONE (OUTPATIENT)
Dept: PRIMARY CARE CLINIC | Facility: CLINIC | Age: 40
End: 2025-06-09
Payer: COMMERCIAL

## 2025-06-09 NOTE — TELEPHONE ENCOUNTER
Copied from CRM #[id number]. Topic: General Inquiry - Patient Advice  >> Jun 9, 2025  4:12 PM Nicole wrote:  .1MEDICALADVICE     Patient is calling for Medical Advice regarding:pt is calling she would like a callback she is almost out of her meds. Like 3 days left..EScitalopram oxalate (LEXAPRO) 20 MG tablet  Please call her back and advise.    How long has patient had these symptoms:    Pharmacy name and phone#:    Patient wants a call back or thru [facility]:[phone]    Comments:    Please advise patient replies from provider may take up to 48 hours.

## 2025-06-16 ENCOUNTER — OFFICE VISIT (OUTPATIENT)
Dept: INTERNAL MEDICINE | Facility: CLINIC | Age: 40
End: 2025-06-16
Payer: COMMERCIAL

## 2025-06-16 DIAGNOSIS — F41.1 GENERALIZED ANXIETY DISORDER: ICD-10-CM

## 2025-06-16 DIAGNOSIS — J30.2 SEASONAL ALLERGIES: ICD-10-CM

## 2025-06-16 DIAGNOSIS — E78.2 MIXED HYPERLIPIDEMIA: ICD-10-CM

## 2025-06-16 PROCEDURE — 98005 SYNCH AUDIO-VIDEO EST LOW 20: CPT | Mod: 95,,,

## 2025-06-16 PROCEDURE — 1160F RVW MEDS BY RX/DR IN RCRD: CPT | Mod: CPTII,95,,

## 2025-06-16 PROCEDURE — 1159F MED LIST DOCD IN RCRD: CPT | Mod: CPTII,95,,

## 2025-06-16 RX ORDER — ROSUVASTATIN CALCIUM 10 MG/1
10 TABLET, COATED ORAL DAILY
Qty: 90 TABLET | Refills: 0 | Status: SHIPPED | OUTPATIENT
Start: 2025-06-16

## 2025-06-16 RX ORDER — AZELASTINE 1 MG/ML
1 SPRAY, METERED NASAL 2 TIMES DAILY
Qty: 30 ML | Refills: 5 | Status: SHIPPED | OUTPATIENT
Start: 2025-06-16

## 2025-06-16 RX ORDER — ESOMEPRAZOLE MAGNESIUM 40 MG/1
40 CAPSULE, DELAYED RELEASE ORAL
Qty: 90 CAPSULE | Refills: 0 | Status: SHIPPED | OUTPATIENT
Start: 2025-06-16

## 2025-06-16 RX ORDER — ESCITALOPRAM OXALATE 20 MG/1
20 TABLET ORAL DAILY
Qty: 90 TABLET | Refills: 0 | Status: SHIPPED | OUTPATIENT
Start: 2025-06-16 | End: 2026-06-16

## 2025-06-16 RX ORDER — FLUTICASONE PROPIONATE 50 MCG
1 SPRAY, SUSPENSION (ML) NASAL 2 TIMES DAILY
Qty: 32 ML | Refills: 3 | Status: SHIPPED | OUTPATIENT
Start: 2025-06-16
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
## 2025-06-24 DIAGNOSIS — M54.50 LUMBAR BACK PAIN: ICD-10-CM

## 2025-06-24 DIAGNOSIS — G43.909 MIGRAINE: Primary | ICD-10-CM

## 2025-06-24 DIAGNOSIS — M47.896 OTHER SPONDYLOSIS, LUMBAR REGION: ICD-10-CM

## 2025-07-03 DIAGNOSIS — Z30.41 SURVEILLANCE FOR BIRTH CONTROL, ORAL CONTRACEPTIVES: ICD-10-CM

## 2025-07-03 RX ORDER — DESOGESTREL AND ETHINYL ESTRADIOL AND ETHINYL ESTRADIOL 21-5 (28)
1 KIT ORAL
Qty: 84 TABLET | Refills: 0 | Status: SHIPPED | OUTPATIENT
Start: 2025-07-03

## 2025-07-03 NOTE — TELEPHONE ENCOUNTER
Refill Decision Note   Luz Andrademasood  is requesting a refill authorization.  Brief Assessment and Rationale for Refill:  Approve     Medication Therapy Plan:        Pharmacist review requested: Yes   Extended chart review required: Yes   Comments:     Note composed:9:55 AM 07/03/2025

## 2025-07-03 NOTE — TELEPHONE ENCOUNTER
Refill Routing Note   Medication(s) are not appropriate for processing by [facility] for the following reason(s):        Drug-disease interaction    [facility] action(s):  Defer      Medication Therapy Plan: Drug-Disease: VIORELE (28) and Mixed hyperlipidemia    Pharmacist review requested: Yes     Appointments  past 12m or future 3m with PCP    Date Provider   Last Visit   6/11/2024 Ifeoma Gordon MD   Next Visit   7/16/2025 Ifeoma Gordon MD   ED visits in past 90 days: 0        Note composed:1:56 AM 07/03/2025

## 2025-07-16 ENCOUNTER — OFFICE VISIT (OUTPATIENT)
Facility: CLINIC | Age: 40
End: 2025-07-16
Attending: OBSTETRICS & GYNECOLOGY
Payer: COMMERCIAL

## 2025-07-16 VITALS
HEIGHT: 60 IN | BODY MASS INDEX: 32.33 KG/M2 | SYSTOLIC BLOOD PRESSURE: 91 MMHG | HEART RATE: 94 BPM | DIASTOLIC BLOOD PRESSURE: 58 MMHG | WEIGHT: 164.69 LBS

## 2025-07-16 DIAGNOSIS — Z01.419 ENCOUNTER FOR GYNECOLOGICAL EXAMINATION WITHOUT ABNORMAL FINDING: Primary | ICD-10-CM

## 2025-07-16 DIAGNOSIS — Z30.41 SURVEILLANCE FOR BIRTH CONTROL, ORAL CONTRACEPTIVES: ICD-10-CM

## 2025-07-16 PROCEDURE — 99999 PR PBB SHADOW E&M-EST. PATIENT-LVL IV: CPT | Mod: PBBFAC,,, | Performed by: OBSTETRICS & GYNECOLOGY

## 2025-07-16 RX ORDER — DESOGESTREL AND ETHINYL ESTRADIOL 21-5 (28)
1 KIT ORAL DAILY
Qty: 84 TABLET | Refills: 3 | Status: SHIPPED | OUTPATIENT
Start: 2025-07-16

## 2025-07-16 NOTE — PROGRESS NOTES
Subjective:       Patient ID: Luz Muñiz is a 39 y.o. female.    Chief Complaint:  Annual Exam and Gynecologic Exam      History of Present Illness  Gynecologic Exam  Pertinent negatives include no abdominal pain, back pain or headaches. There is no history of menorrhagia.       Luz Muñiz is a 39 y.o. female  here for her annual GYN exam.  She is not exercising regularly , has had multiple injuries secondary to her Portia Danlos condition.   She describes her periods as regular, normal flow, lasting 5 days. (Well controlled on low dose OCP's)  denies break through bleeding.   denies vaginal itching or irritation.  Denies vaginal discharge.  She is not currently sexually active.   She uses abstinence, oral contraceptives (estrogen/progesterone) for contraception.   History of abnormal pap: No  Last Pap: 2/10/2023normal, Neg HR HPV  Last MMG: NA  Last Colonoscopy:  2021(polyps, advised to return In 7 years)  denies domestic violence. She does feel safe at home.     Past Medical History:   Diagnosis Date    Arthritis     EDS (Portia-Danlos syndrome)     GERD (gastroesophageal reflux disease)     Hyperlipidemia     Seasonal allergies 6/10/2015     Past Surgical History:   Procedure Laterality Date    COLONOSCOPY N/A 2021    Procedure: COLONOSCOPY;  Surgeon: Cornelio Webb MD;  Location: [facility] ([address] [place] [address]);  Service: Endoscopy;  Laterality: N/A;  Covid test at [place] on     ESOPHAGEAL MANOMETRY WITH MEASUREMENT OF IMPEDANCE N/A 2019    Procedure: MANOMETRY, ESOPHAGUS, WITH IMPEDANCE MEASUREMENT;  Surgeon: Bradly Germain MD;  Location: [address]);  Service: Endoscopy;  Laterality: N/A;   - pt confirmed    ESOPHAGOGASTRODUODENOSCOPY N/A 2021    Procedure: EGD (ESOPHAGOGASTRODUODENOSCOPY);  Surgeon: Cornelio Webb MD;  Location: [facility] ([address] [place] [address]);  Service: Endoscopy;  Laterality: N/A;    JOINT REPLACEMENT  2020    RADIOFREQUENCY ABLATION,  NERVE, SPINAL, LUMBOSACRAL  2024    TONSILLECTOMY  2014    WRIST SURGERY      Ligament repair of Right wrist x 2    WRIST SURGERY Left 2025     Social History[1]  Family History   Problem Relation Name Age of Onset    Hyperlipidemia Father Ray     Heart disease Father Ray         valve replacement    Arthritis Father Ray     Hearing loss Father Ray     Hyperlipidemia Mother Nicolle     Hearing loss Paternal Grandfather Pietro     Cancer Maternal Grandmother          skin    No Known Problems Brother Tres     Acne Neg Hx      Colon cancer Neg Hx      Ovarian cancer Neg Hx      Breast cancer Neg Hx      Diabetes Neg Hx      Hypertension Neg Hx      Eclampsia Neg Hx      Miscarriages / Stillbirths Neg Hx       labor Neg Hx      Stroke Neg Hx      Colon polyps Neg Hx      Liver cancer Neg Hx      Liver disease Neg Hx      Cirrhosis Neg Hx      Rectal cancer Neg Hx      Stomach cancer Neg Hx      Esophageal cancer Neg Hx      Celiac disease Neg Hx      Inflammatory bowel disease Neg Hx      Crohn's disease Neg Hx       OB History          0    Para        Term                AB        Living             SAB        IAB        Ectopic        Multiple        Live Births                     BP (!) 91/58 (Patient Position: Sitting)   Pulse 94   Ht 5' (1.524 m)   Wt 74.7 kg (164 lb 10.9 oz)   LMP 2025 (Exact Date)   BMI 32.16 kg/m²         GYN & OB History  Patient's last menstrual period was 2025 (exact date).       OB History    Para Term  AB Living   0        SAB IAB Ectopic Multiple Live Births              Review of Systems  Review of Systems   Constitutional:  Negative for activity change, appetite change, fatigue and unexpected weight change.   HENT: Negative.     Eyes:  Negative for visual disturbance.   Respiratory:  Negative for shortness of breath and wheezing.    Cardiovascular:  Negative for chest pain, palpitations and leg swelling.    Gastrointestinal:  Negative for abdominal pain, bloating and blood in stool.   Endocrine: Negative for diabetes and hair loss.   Genitourinary:  Negative for hot flashes, menorrhagia and menstrual problem.   Musculoskeletal:  Negative for back pain and joint swelling.   Integumentary:  Negative for acne, hair changes and nipple discharge.   Neurological:  Negative for headaches.   Hematological:  Does not bruise/bleed easily.   Psychiatric/Behavioral:  Negative for depression and sleep disturbance. The patient is not nervous/anxious.    Breast: Negative for mastodynia and nipple discharge          Objective:      Physical Exam:   Constitutional: She is oriented to person, place, and time. She appears well-developed and well-nourished.    HENT:   Head: Normocephalic and atraumatic.    Eyes: Pupils are equal, round, and reactive to light. EOM are normal.     Cardiovascular:  Normal rate and regular rhythm.             Pulmonary/Chest: Effort normal and breath sounds normal.   BREASTS:  no mass, no tenderness, no deformity and no retraction. Right breast exhibits no inverted nipple, no mass, no nipple discharge, no skin change, no tenderness, no bleeding and no swelling. Left breast exhibits no inverted nipple, no mass, no nipple discharge, no skin change, no tenderness, no bleeding and no swelling. Breasts are symmetrical.              Abdominal: Soft. Bowel sounds are normal.     Genitourinary:    Pelvic exam was performed with patient supine.      Genitourinary Comments: PELVIC: Normal external genitalia without lesions.  Normal hair distribution.  Adequate perineal body, normal urethral meatus.  Vagina moist and well rugated without lesions or discharge.  Cervix pink,  Nulliparous appearing, without lesions, discharge or tenderness.  No significant cystocele or rectocele.  Bimanual exam shows uterus to be normal size, regular, mobile and nontender.  Adnexa without masses or tenderness.                    Musculoskeletal: Normal range of motion and moves all extremeties.       Neurological: She is alert and oriented to person, place, and time.    Skin: Skin is warm and dry.    Psychiatric: She has a normal mood and affect.              Assessment:        1. Encounter for gynecological examination without abnormal finding    2. Surveillance for birth control, oral contraceptives                Plan:        Problem List Items Addressed This Visit    None  Visit Diagnoses         Encounter for gynecological examination without abnormal finding    -  Primary      Surveillance for birth control, oral contraceptives        Relevant Medications    desog-e.estradioL/e.estradioL (VIORELE, 28,) 0.15-0.02 mgx21 /0.01 mg x 5 per tablet            Follow up in about 1 year (around 7/16/2026).            [1]   Social History  Socioeconomic History    Marital status: Single   Occupational History    Occupation:    Tobacco Use    Smoking status: Never     Passive exposure: Never    Smokeless tobacco: Never   Vaping Use    Vaping status: Never Used   Substance and Sexual Activity    Alcohol use: Yes     Alcohol/week: 1.0 standard drink of alcohol     Types: 1 Glasses of wine per week     Comment: socially    Drug use: No    Sexual activity: Not Currently     Partners: Male     Birth control/protection: OCP   Other Topics Concern    Are you pregnant or think you may be? No    Breast-feeding No     Social Drivers of Health     Financial Resource Strain: Low Risk  (7/14/2024)    Overall Financial Resource Strain (CARDIA)     Difficulty of Paying Living Expenses: Not very hard   Food Insecurity: No Food Insecurity (7/14/2024)    Hunger Vital Sign     Worried About Running Out of Food in the Last Year: Never true     Ran Out of Food in the Last Year: Never true   Transportation Needs: No Transportation Needs (6/12/2023)    PRAPARE - Transportation     Lack of Transportation (Medical): No     Lack of Transportation  (Non-Medical): No   Physical Activity: Insufficiently Active (7/14/2024)    Exercise Vital Sign     Days of Exercise per Week: 3 days     Minutes of Exercise per Session: 20 min   Stress: No Stress Concern Present (7/14/2024)    [employer] [place] [employer] - Occupational Stress Questionnaire     Feeling of Stress : Only a little   Housing Stability: Low Risk  (6/12/2023)    Housing Stability Vital Sign     Unable to Pay for Housing in the Last Year: No     Number of Places Lived in the Last Year: 1     Unstable Housing in the Last Year: No

## 2025-08-19 ENCOUNTER — OFFICE VISIT (OUTPATIENT)
Dept: PRIMARY CARE CLINIC | Facility: CLINIC | Age: 40
End: 2025-08-19
Payer: COMMERCIAL

## 2025-08-19 VITALS
HEIGHT: 60 IN | WEIGHT: 162.25 LBS | DIASTOLIC BLOOD PRESSURE: 76 MMHG | HEART RATE: 88 BPM | SYSTOLIC BLOOD PRESSURE: 116 MMHG | BODY MASS INDEX: 31.86 KG/M2 | OXYGEN SATURATION: 99 %

## 2025-08-19 DIAGNOSIS — Q79.60 EHLERS-DANLOS SYNDROME: ICD-10-CM

## 2025-08-19 DIAGNOSIS — R55 PRE-SYNCOPE: ICD-10-CM

## 2025-08-19 DIAGNOSIS — E66.811 CLASS 1 OBESITY DUE TO EXCESS CALORIES WITH SERIOUS COMORBIDITY AND BODY MASS INDEX (BMI) OF 31.0 TO 31.9 IN ADULT: ICD-10-CM

## 2025-08-19 DIAGNOSIS — Z00.00 ANNUAL PHYSICAL EXAM: Primary | ICD-10-CM

## 2025-08-19 DIAGNOSIS — Z13.6 ENCOUNTER FOR LIPID SCREENING FOR CARDIOVASCULAR DISEASE: ICD-10-CM

## 2025-08-19 DIAGNOSIS — Z51.81 ENCOUNTER FOR MONITORING LONG-TERM PROTON PUMP INHIBITOR THERAPY: ICD-10-CM

## 2025-08-19 DIAGNOSIS — Z13.220 ENCOUNTER FOR LIPID SCREENING FOR CARDIOVASCULAR DISEASE: ICD-10-CM

## 2025-08-19 DIAGNOSIS — K21.00 GASTROESOPHAGEAL REFLUX DISEASE WITH ESOPHAGITIS WITHOUT HEMORRHAGE: ICD-10-CM

## 2025-08-19 DIAGNOSIS — Z79.899 ENCOUNTER FOR MONITORING LONG-TERM PROTON PUMP INHIBITOR THERAPY: ICD-10-CM

## 2025-08-19 DIAGNOSIS — E78.2 MIXED HYPERLIPIDEMIA: ICD-10-CM

## 2025-08-19 DIAGNOSIS — E61.1 IRON DEFICIENCY: ICD-10-CM

## 2025-08-19 DIAGNOSIS — E66.09 CLASS 1 OBESITY DUE TO EXCESS CALORIES WITH SERIOUS COMORBIDITY AND BODY MASS INDEX (BMI) OF 31.0 TO 31.9 IN ADULT: ICD-10-CM

## 2025-08-19 DIAGNOSIS — F41.1 GENERALIZED ANXIETY DISORDER: ICD-10-CM

## 2025-08-19 PROCEDURE — 99395 PREV VISIT EST AGE 18-39: CPT | Mod: S$GLB,,, | Performed by: FAMILY MEDICINE

## 2025-08-19 PROCEDURE — 1160F RVW MEDS BY RX/DR IN RCRD: CPT | Mod: CPTII,S$GLB,, | Performed by: FAMILY MEDICINE

## 2025-08-19 PROCEDURE — 99999 PR PBB SHADOW E&M-EST. PATIENT-LVL V: CPT | Mod: PBBFAC,,, | Performed by: FAMILY MEDICINE

## 2025-08-19 PROCEDURE — 3074F SYST BP LT 130 MM HG: CPT | Mod: CPTII,S$GLB,, | Performed by: FAMILY MEDICINE

## 2025-08-19 PROCEDURE — 3008F BODY MASS INDEX DOCD: CPT | Mod: CPTII,S$GLB,, | Performed by: FAMILY MEDICINE

## 2025-08-19 PROCEDURE — 1159F MED LIST DOCD IN RCRD: CPT | Mod: CPTII,S$GLB,, | Performed by: FAMILY MEDICINE

## 2025-08-19 PROCEDURE — 3078F DIAST BP <80 MM HG: CPT | Mod: CPTII,S$GLB,, | Performed by: FAMILY MEDICINE

## 2025-08-20 ENCOUNTER — PATIENT OUTREACH (OUTPATIENT)
Dept: ADMINISTRATIVE | Facility: OTHER | Age: 40
End: 2025-08-20
Payer: COMMERCIAL

## 2025-08-24 ENCOUNTER — PATIENT MESSAGE (OUTPATIENT)
Dept: PRIMARY CARE CLINIC | Facility: CLINIC | Age: 40
End: 2025-08-24
Payer: COMMERCIAL

## 2025-08-27 ENCOUNTER — OFFICE VISIT (OUTPATIENT)
Dept: NEUROLOGY | Facility: CLINIC | Age: 40
End: 2025-08-27
Payer: COMMERCIAL

## 2025-08-27 VITALS
WEIGHT: 164.44 LBS | BODY MASS INDEX: 32.28 KG/M2 | HEART RATE: 94 BPM | DIASTOLIC BLOOD PRESSURE: 70 MMHG | HEIGHT: 60 IN | SYSTOLIC BLOOD PRESSURE: 100 MMHG

## 2025-08-27 DIAGNOSIS — G43.009 MIGRAINE WITHOUT AURA AND WITHOUT STATUS MIGRAINOSUS, NOT INTRACTABLE: Primary | ICD-10-CM

## 2025-08-27 DIAGNOSIS — Z79.899 MEDICATION MANAGEMENT: ICD-10-CM

## 2025-08-27 DIAGNOSIS — Z76.89 ENCOUNTER TO ESTABLISH CARE: ICD-10-CM

## 2025-08-27 PROCEDURE — 3078F DIAST BP <80 MM HG: CPT | Mod: CPTII,S$GLB,, | Performed by: STUDENT IN AN ORGANIZED HEALTH CARE EDUCATION/TRAINING PROGRAM

## 2025-08-27 PROCEDURE — 1160F RVW MEDS BY RX/DR IN RCRD: CPT | Mod: CPTII,S$GLB,, | Performed by: STUDENT IN AN ORGANIZED HEALTH CARE EDUCATION/TRAINING PROGRAM

## 2025-08-27 PROCEDURE — 99204 OFFICE O/P NEW MOD 45 MIN: CPT | Mod: S$GLB,,, | Performed by: STUDENT IN AN ORGANIZED HEALTH CARE EDUCATION/TRAINING PROGRAM

## 2025-08-27 PROCEDURE — 1159F MED LIST DOCD IN RCRD: CPT | Mod: CPTII,S$GLB,, | Performed by: STUDENT IN AN ORGANIZED HEALTH CARE EDUCATION/TRAINING PROGRAM

## 2025-08-27 PROCEDURE — 99999 PR PBB SHADOW E&M-EST. PATIENT-LVL III: CPT | Mod: PBBFAC,,, | Performed by: STUDENT IN AN ORGANIZED HEALTH CARE EDUCATION/TRAINING PROGRAM

## 2025-08-27 PROCEDURE — 3008F BODY MASS INDEX DOCD: CPT | Mod: CPTII,S$GLB,, | Performed by: STUDENT IN AN ORGANIZED HEALTH CARE EDUCATION/TRAINING PROGRAM

## 2025-08-27 PROCEDURE — 3074F SYST BP LT 130 MM HG: CPT | Mod: CPTII,S$GLB,, | Performed by: STUDENT IN AN ORGANIZED HEALTH CARE EDUCATION/TRAINING PROGRAM

## 2025-08-27 RX ORDER — RIMEGEPANT SULFATE 75 MG/75MG
75 TABLET, ORALLY DISINTEGRATING ORAL DAILY PRN
Qty: 12 TABLET | Refills: 11 | Status: SHIPPED | OUTPATIENT
Start: 2025-08-27

## 2025-09-01 ENCOUNTER — PATIENT MESSAGE (OUTPATIENT)
Dept: PRIMARY CARE CLINIC | Facility: CLINIC | Age: 40
End: 2025-09-01
Payer: COMMERCIAL